# Patient Record
Sex: MALE | Race: WHITE | Employment: FULL TIME | URBAN - METROPOLITAN AREA
[De-identification: names, ages, dates, MRNs, and addresses within clinical notes are randomized per-mention and may not be internally consistent; named-entity substitution may affect disease eponyms.]

---

## 2017-05-16 ENCOUNTER — OFFICE VISIT (OUTPATIENT)
Dept: FAMILY MEDICINE CLINIC | Age: 27
End: 2017-05-16
Payer: COMMERCIAL

## 2017-05-16 VITALS
BODY MASS INDEX: 35.55 KG/M2 | DIASTOLIC BLOOD PRESSURE: 80 MMHG | WEIGHT: 240 LBS | HEIGHT: 69 IN | SYSTOLIC BLOOD PRESSURE: 120 MMHG

## 2017-05-16 DIAGNOSIS — F41.9 ANXIETY: ICD-10-CM

## 2017-05-16 PROCEDURE — 99214 OFFICE O/P EST MOD 30 MIN: CPT | Performed by: FAMILY MEDICINE

## 2017-05-16 RX ORDER — CITALOPRAM 20 MG/1
TABLET ORAL
Qty: 30 TABLET | Refills: 5 | Status: SHIPPED | OUTPATIENT
Start: 2017-05-16 | End: 2017-11-16 | Stop reason: SDUPTHER

## 2017-05-16 RX ORDER — ALPRAZOLAM 0.5 MG/1
0.5 TABLET ORAL NIGHTLY PRN
Qty: 30 TABLET | Refills: 0 | Status: SHIPPED | OUTPATIENT
Start: 2017-05-16 | End: 2017-11-16 | Stop reason: SDUPTHER

## 2017-05-16 RX ORDER — UBIDECARENONE 75 MG
50 CAPSULE ORAL DAILY
COMMUNITY

## 2017-05-16 ASSESSMENT — ENCOUNTER SYMPTOMS
EYE REDNESS: 0
DIARRHEA: 0
VOMITING: 0
COUGH: 0
ABDOMINAL PAIN: 0
NAUSEA: 0
EYE DISCHARGE: 0
SHORTNESS OF BREATH: 0

## 2017-08-07 ENCOUNTER — TELEPHONE (OUTPATIENT)
Dept: FAMILY MEDICINE CLINIC | Age: 27
End: 2017-08-07

## 2017-08-08 ENCOUNTER — NURSE ONLY (OUTPATIENT)
Dept: FAMILY MEDICINE CLINIC | Age: 27
End: 2017-08-08
Payer: COMMERCIAL

## 2017-08-08 DIAGNOSIS — J30.89 ENVIRONMENTAL AND SEASONAL ALLERGIES: Primary | ICD-10-CM

## 2017-08-08 PROCEDURE — 96372 THER/PROPH/DIAG INJ SC/IM: CPT | Performed by: FAMILY MEDICINE

## 2017-08-08 RX ORDER — TRIAMCINOLONE ACETONIDE 40 MG/ML
40 INJECTION, SUSPENSION INTRA-ARTICULAR; INTRAMUSCULAR ONCE
Status: COMPLETED | OUTPATIENT
Start: 2017-08-08 | End: 2017-08-08

## 2017-08-08 RX ADMIN — TRIAMCINOLONE ACETONIDE 40 MG: 40 INJECTION, SUSPENSION INTRA-ARTICULAR; INTRAMUSCULAR at 08:42

## 2017-11-16 ENCOUNTER — OFFICE VISIT (OUTPATIENT)
Dept: FAMILY MEDICINE CLINIC | Age: 27
End: 2017-11-16
Payer: COMMERCIAL

## 2017-11-16 VITALS — BODY MASS INDEX: 37.95 KG/M2 | WEIGHT: 257 LBS | SYSTOLIC BLOOD PRESSURE: 128 MMHG | DIASTOLIC BLOOD PRESSURE: 88 MMHG

## 2017-11-16 DIAGNOSIS — F41.9 ANXIETY: ICD-10-CM

## 2017-11-16 PROCEDURE — 99213 OFFICE O/P EST LOW 20 MIN: CPT | Performed by: FAMILY MEDICINE

## 2017-11-16 RX ORDER — ALPRAZOLAM 0.5 MG/1
0.5 TABLET ORAL NIGHTLY PRN
Qty: 30 TABLET | Refills: 0 | Status: SHIPPED | OUTPATIENT
Start: 2017-11-16 | End: 2018-05-16 | Stop reason: SDUPTHER

## 2017-11-16 RX ORDER — CITALOPRAM 20 MG/1
TABLET ORAL
Qty: 30 TABLET | Refills: 5 | Status: SHIPPED | OUTPATIENT
Start: 2017-11-16 | End: 2018-05-16 | Stop reason: SDUPTHER

## 2017-11-16 ASSESSMENT — ENCOUNTER SYMPTOMS
EYE REDNESS: 0
VOMITING: 0
TROUBLE SWALLOWING: 0
ABDOMINAL PAIN: 0
DIARRHEA: 0
EYE DISCHARGE: 0
BLOOD IN STOOL: 0
SHORTNESS OF BREATH: 0
COUGH: 0
NAUSEA: 0
CONSTIPATION: 0

## 2017-11-16 NOTE — PROGRESS NOTES
use drugs. Family History:     Family History   Problem Relation Age of Onset    High Cholesterol Father        Review of Systems:       Review of Systems   Constitutional: Negative for chills, fatigue and fever. HENT: Negative for trouble swallowing. Eyes: Negative for discharge, redness and visual disturbance. Respiratory: Negative for cough and shortness of breath. Cardiovascular: Negative for chest pain, palpitations and leg swelling. Gastrointestinal: Negative for abdominal pain, blood in stool, constipation, diarrhea, nausea and vomiting. Genitourinary: Negative for dysuria and hematuria. Musculoskeletal: Negative for joint swelling and neck pain. Skin: Negative for rash. Neurological: Negative for dizziness, tremors, light-headedness and headaches. Psychiatric/Behavioral: Negative for sleep disturbance. The patient is nervous/anxious. Physical Exam:     Physical Exam   Constitutional: He is oriented to person, place, and time. He appears well-developed and well-nourished. HENT:   Head: Normocephalic and atraumatic. Eyes: Conjunctivae are normal. Pupils are equal, round, and reactive to light. Right eye exhibits no discharge. Left eye exhibits no discharge. Neck: Neck supple. No thyromegaly present. Cardiovascular: Normal rate and regular rhythm. No murmur heard. Pulmonary/Chest: Effort normal and breath sounds normal. No respiratory distress. He has no wheezes. Abdominal: Soft. Bowel sounds are normal. He exhibits no distension. There is no tenderness. Musculoskeletal: He exhibits no edema. Lymphadenopathy:     He has no cervical adenopathy. Neurological: He is alert and oriented to person, place, and time. Skin: Skin is warm and dry. No rash noted. No erythema. Psychiatric: He has a normal mood and affect. Vitals reviewed.       Vitals:  /88   Wt 257 lb (116.6 kg)   BMI 37.95 kg/m²       Data:     Lab Results   Component Value Date     08/03/2012    K 3.9 08/03/2012     08/03/2012    CO2 29 08/03/2012    BUN 13 08/03/2012    CREATININE 1.02 08/03/2012    GLUCOSE 93 09/29/2016    LABALBU 5.0 08/03/2012    BILITOT 0.93 08/03/2012    ALKPHOS 72 08/03/2012    AST 20 08/03/2012    ALT 25 08/03/2012     Lab Results   Component Value Date    WBC 7.1 08/03/2012    RBC 5.48 08/03/2012    HGB 16.3 08/03/2012    HCT 47.3 08/03/2012    MCV 86.4 08/03/2012    MCH 29.8 08/03/2012    MCHC 34.5 08/03/2012    RDW 12.4 08/03/2012     08/03/2012    MPV NOT REPORTED 08/03/2012     Lab Results   Component Value Date    TSH 1.15 08/03/2012     Lab Results   Component Value Date    CHOL 233 09/29/2016    HDL 45 09/29/2016          Assessment/Plan:       1. Anxiety  Pt to continue on the celexa 20mg daily and then ONLY use the xanax as needed. - citalopram (CELEXA) 20 MG tablet; take 1 tablet by mouth daily  Dispense: 30 tablet; Refill: 5    F/u 6mos, pt will try to eat less fast food and try to exercise better over the winter to lose wt.     Electronically signed by Vikram Swift MD on 11/16/2017 at 11:32 AM

## 2018-05-16 ENCOUNTER — OFFICE VISIT (OUTPATIENT)
Dept: FAMILY MEDICINE CLINIC | Age: 28
End: 2018-05-16
Payer: COMMERCIAL

## 2018-05-16 VITALS
SYSTOLIC BLOOD PRESSURE: 130 MMHG | WEIGHT: 268 LBS | OXYGEN SATURATION: 98 % | DIASTOLIC BLOOD PRESSURE: 86 MMHG | HEART RATE: 90 BPM | BODY MASS INDEX: 39.58 KG/M2

## 2018-05-16 DIAGNOSIS — F41.9 ANXIETY: ICD-10-CM

## 2018-05-16 PROCEDURE — 99213 OFFICE O/P EST LOW 20 MIN: CPT | Performed by: FAMILY MEDICINE

## 2018-05-16 RX ORDER — ALPRAZOLAM 0.5 MG/1
0.5 TABLET ORAL NIGHTLY PRN
Qty: 30 TABLET | Refills: 0 | Status: SHIPPED | OUTPATIENT
Start: 2018-05-16 | End: 2018-11-13 | Stop reason: SDUPTHER

## 2018-05-16 RX ORDER — CITALOPRAM 20 MG/1
TABLET ORAL
Qty: 30 TABLET | Refills: 5 | Status: SHIPPED | OUTPATIENT
Start: 2018-05-16 | End: 2018-11-13 | Stop reason: SDUPTHER

## 2018-05-16 ASSESSMENT — PATIENT HEALTH QUESTIONNAIRE - PHQ9
1. LITTLE INTEREST OR PLEASURE IN DOING THINGS: 0
SUM OF ALL RESPONSES TO PHQ9 QUESTIONS 1 & 2: 0
SUM OF ALL RESPONSES TO PHQ QUESTIONS 1-9: 0
2. FEELING DOWN, DEPRESSED OR HOPELESS: 0

## 2018-05-16 ASSESSMENT — ENCOUNTER SYMPTOMS
DIARRHEA: 0
COUGH: 0
EYE REDNESS: 0
EYE DISCHARGE: 0
SHORTNESS OF BREATH: 0
VOMITING: 0
SORE THROAT: 0

## 2018-08-02 ENCOUNTER — NURSE ONLY (OUTPATIENT)
Dept: FAMILY MEDICINE CLINIC | Age: 28
End: 2018-08-02
Payer: COMMERCIAL

## 2018-08-02 ENCOUNTER — TELEPHONE (OUTPATIENT)
Dept: FAMILY MEDICINE CLINIC | Age: 28
End: 2018-08-02

## 2018-08-02 DIAGNOSIS — J30.89 ENVIRONMENTAL AND SEASONAL ALLERGIES: Primary | ICD-10-CM

## 2018-08-02 PROCEDURE — 96372 THER/PROPH/DIAG INJ SC/IM: CPT | Performed by: FAMILY MEDICINE

## 2018-08-02 RX ORDER — TRIAMCINOLONE ACETONIDE 40 MG/ML
40 INJECTION, SUSPENSION INTRA-ARTICULAR; INTRAMUSCULAR ONCE
Status: COMPLETED | OUTPATIENT
Start: 2018-08-02 | End: 2018-08-02

## 2018-08-02 RX ADMIN — TRIAMCINOLONE ACETONIDE 40 MG: 40 INJECTION, SUSPENSION INTRA-ARTICULAR; INTRAMUSCULAR at 16:02

## 2018-08-02 NOTE — TELEPHONE ENCOUNTER
Patient asking if he could get a kenalog shot for his allergies - last one was 08/08/17    Health Maintenance   Topic Date Due    HIV screen  12/25/2004    Flu vaccine (1) 11/26/2018 (Originally 9/1/2018)    DTaP/Tdap/Td vaccine (2 - Td) 08/11/2025             (applicable per patient's age: Cancer Screenings, Depression Screening, Fall Risk Screening, Immunizations)    LDL Cholesterol (mg/dL)   Date Value   09/29/2016 149 (H)     AST (U/L)   Date Value   08/03/2012 20     ALT (U/L)   Date Value   08/03/2012 25     BUN (mg/dL)   Date Value   08/03/2012 13      (goal A1C is < 7)   (goal LDL is <100) need 30-50% reduction from baseline     BP Readings from Last 3 Encounters:   05/16/18 130/86   11/16/17 128/88   05/16/17 120/80    (goal /80)      All Future Testing planned in [facility]:      Next Visit Date:  Future Appointments  Date Time Provider José Miguel Chauhan   11/13/2018 10:30 AM MD Marek Hanson [facility]            Patient Active Problem List:     Anxiety     GERD (gastroesophageal reflux disease)     Seasonal allergies

## 2018-10-20 ENCOUNTER — OFFICE VISIT (OUTPATIENT)
Dept: PRIMARY CARE CLINIC | Age: 28
End: 2018-10-20
Payer: COMMERCIAL

## 2018-10-20 VITALS
WEIGHT: 253 LBS | TEMPERATURE: 98.8 F | HEART RATE: 100 BPM | BODY MASS INDEX: 37.36 KG/M2 | SYSTOLIC BLOOD PRESSURE: 156 MMHG | OXYGEN SATURATION: 96 % | DIASTOLIC BLOOD PRESSURE: 98 MMHG

## 2018-10-20 DIAGNOSIS — J20.9 BRONCHITIS, ACUTE, WITH BRONCHOSPASM: Primary | ICD-10-CM

## 2018-10-20 PROCEDURE — 99213 OFFICE O/P EST LOW 20 MIN: CPT | Performed by: NURSE PRACTITIONER

## 2018-10-20 RX ORDER — AMOXICILLIN 500 MG/1
500 CAPSULE ORAL 3 TIMES DAILY
Qty: 30 CAPSULE | Refills: 0 | Status: SHIPPED | OUTPATIENT
Start: 2018-10-20 | End: 2018-10-30

## 2018-10-20 ASSESSMENT — ENCOUNTER SYMPTOMS
RHINORRHEA: 1
SHORTNESS OF BREATH: 1
VOMITING: 0
DIARRHEA: 0
SORE THROAT: 1
WHEEZING: 0
COUGH: 1
NAUSEA: 1

## 2018-10-20 NOTE — PROGRESS NOTES
[address] [facility] WALK-IN [facility] [place] [phone] [id number]  Dept: [phone]  Dept Fax: [phone]    Susan Anderson is a 28 y.o. male who presents to the [facility] in Care today for hismedical conditions/complaints as noted below. Guilherme Sleet Holthouse is c/o of Cough (Patient complains of productive cough, started 5 days ago. No fever.) and Pharyngitis (Patient complains of sore throat that started 5 days, he said he think throat hurts from drainage. )      HPI:     Cough   This is a new problem. The current episode started in the past 7 days (Started 5 days ago with productive cough, runny nose, nasal congestion and sore throat. Denies fever. Having cold sweats at night.). The problem has been gradually worsening. The problem occurs every few minutes. The cough is productive of sputum. Associated symptoms include headaches, nasal congestion, postnasal drip, rhinorrhea, a sore throat and shortness of breath. Pertinent negatives include no chest pain, chills, ear congestion, ear pain, fever, myalgias, rash or wheezing. The symptoms are aggravated by lying down. Risk factors for lung disease include smoking/tobacco exposure. Treatments tried: ibuprofen. The treatment provided no relief. There is no history of asthma, bronchitis, environmental allergies or pneumonia.        Past Medical History:   Diagnosis Date    Anxiety     Heart palpitations     Hypertension         Current Outpatient Prescriptions   Medication Sig Dispense Refill    Pseudoephedrine-DM-GG 60- MG TABS Take 1 tablet by mouth every 6 hours as needed (For cough, congestion and/or sinus pressure.) 28 tablet 0    amoxicillin (AMOXIL) 500 MG capsule Take 1 capsule by mouth 3 times daily for 10 days 30 capsule 0    citalopram (CELEXA) 20 MG tablet take 1 tablet by mouth daily 30 tablet 5    vitamin B-12 (CYANOCOBALAMIN) 100 MCG tablet Take 50 mcg by mouth daily      Cholecalciferol (VITAMIN D3) 5000 UNITS TABS Pt taking 2 tablet daily       No current facility-administered medications for this visit. No Known Allergies    Subjective:     Review of Systems   Constitutional: Positive for diaphoresis and fatigue. Negative for appetite change, chills and fever. HENT: Positive for congestion, postnasal drip, rhinorrhea and sore throat. Negative for ear pain. Respiratory: Positive for cough and shortness of breath. Negative for wheezing. Cardiovascular: Negative for chest pain. Gastrointestinal: Positive for nausea. Negative for diarrhea and vomiting. Rectal pain: few times. Musculoskeletal: Negative for myalgias. Skin: Negative for rash and wound. Allergic/Immunologic: Negative for environmental allergies. Neurological: Positive for headaches. Negative for dizziness and light-headedness. Objective:     Physical Exam   Constitutional: He is oriented to person, place, and time. He appears well-developed and well-nourished. He is cooperative. He does not appear ill. No distress. HENT:   Head: Normocephalic and atraumatic. Right Ear: Hearing, external ear and ear canal normal. No mastoid tenderness. Tympanic membrane is not injected, not erythematous and not bulging. A middle ear effusion (cloudy white fluid) is present. Left Ear: Hearing, external ear and ear canal normal. No mastoid tenderness. Tympanic membrane is not injected, not erythematous and not bulging. A middle ear effusion (cloudy white fluid) is present. Nose: Mucosal edema and rhinorrhea present. Right sinus exhibits no maxillary sinus tenderness and no frontal sinus tenderness. Left sinus exhibits no maxillary sinus tenderness and no frontal sinus tenderness. Mouth/Throat: Uvula is midline and mucous membranes are normal. Posterior oropharyngeal erythema present. No oropharyngeal exudate, posterior oropharyngeal edema or tonsillar abscesses. Eyes: Pupils are equal, round, and reactive to light.  Conjunctivae are normal. Right eye exhibits no discharge. Left eye exhibits no discharge. Cardiovascular: Normal rate, regular rhythm, S1 normal, S2 normal, normal heart sounds and intact distal pulses. Exam reveals no gallop and no friction rub. No murmur heard. Pulmonary/Chest: Effort normal and breath sounds normal. No accessory muscle usage. No respiratory distress. He has no decreased breath sounds. He has no wheezes. He has no rhonchi. He has no rales. Occasional moist cough. Breath sounds clear B/L anterior and posterior lobes. No respiratory distress and/or audible wheezing. No rales or rhonchi. Musculoskeletal: Normal range of motion. Lymphadenopathy:     He has no cervical adenopathy. Right cervical: No superficial cervical and no posterior cervical adenopathy present. Left cervical: No superficial cervical and no posterior cervical adenopathy present. Neurological: He is alert and oriented to person, place, and time. Skin: Skin is warm and dry. No rash noted. He is not diaphoretic. No erythema. No pallor. Psychiatric: He has a normal mood and affect. His behavior is normal.   Nursing note and vitals reviewed. BP (!) 156/98 (Site: Left Upper Arm, Position: Sitting, Cuff Size: Large Adult)   Pulse 100   Temp 98.8 °F (37.1 °C) (Oral)   Wt 253 lb (114.8 kg)   SpO2 96%   BMI 37.36 kg/m²     Assessment:      Diagnosis Orders   1. Bronchitis, acute, with bronchospasm  Pseudoephedrine-DM-GG 60- MG TABS    amoxicillin (AMOXIL) 500 MG capsule       Plan:      Return if symptoms worsen or fail to improve, for Resume all previous medications as directed.     Orders Placed This Encounter   Medications    Pseudoephedrine-DM-GG 60- MG TABS     Sig: Take 1 tablet by mouth every 6 hours as needed (For cough, congestion and/or sinus pressure.)     Dispense:  28 tablet     Refill:  0    amoxicillin (AMOXIL) 500 MG capsule     Sig: Take 1 capsule by mouth 3 times daily for 10 days

## 2018-10-20 NOTE — PATIENT INSTRUCTIONS
taking amoxicillin with clarithromycin and/or lansoprazole to treat stomach ulcer, use all of your medications as directed. Read the medication guide or patient instructions provided with each medication. Do not change your doses or medication schedule without your doctor's advice. Use this medicine for the full prescribed length of time. Your symptoms may improve before the infection is completely cleared. Skipping doses may also increase your risk of further infection that is resistant to antibiotics. Amoxicillin will not treat a viral infection such as the flu or a common cold. Do not share this medicine with another person, even if they have the same symptoms you have. This medicine can cause unusual results with certain medical tests. Tell any doctor who treats you that you are using amoxicillin. Store at room temperature away from moisture, heat, and light. You may store liquid amoxicillin in a refrigerator but do not allow it to freeze. Throw away any liquid amoxicillin that is not used within 14 days after it was mixed at the pharmacy. What happens if I miss a dose? Take the missed dose as soon as you remember. Skip the missed dose if it is almost time for your next scheduled dose. Do not take extra medicine to make up the missed dose. What happens if I overdose? Seek emergency medical attention or call the Poison Help line at 1-874.509.5096. Overdose symptoms may include confusion, behavior changes, a severe skin rash, urinating less than usual, or seizure (black-out or convulsions). What should I avoid while taking amoxicillin? Antibiotic medicines can cause diarrhea, which may be a sign of a new infection. If you have diarrhea that is watery or bloody, stop using amoxicillin and call your doctor. Do not use anti-diarrhea medicine unless your doctor tells you to. What are the possible side effects of amoxicillin?   Get emergency medical help if you have any of these signs of an allergic   · You have a new or higher fever.     · You have a new rash.    Watch closely for changes in your health, and be sure to contact your doctor if:    · You cough more deeply or more often, especially if you notice more mucus or a change in the color of your mucus.     · You are not getting better as expected. Where can you learn more? Go to https://[email or web address] org and sign in to your [employer] account. Enter H333 in the Pantry box to learn more about \"Bronchitis: Care Instructions. \"     If you do not have an account, please click on the \"Sign Up Now\" link. Current as of: December 6, 2017  Content Version: 11.7  © 9424-0722 [employer]. Care instructions adapted under license by [facility] ([facility]). If you have questions about a medical condition or this instruction, always ask your healthcare professional. [address] any warranty or liability for your use of this information. · Antibiotic as directed. Take until all doses are completed. · Probiotic or greek yogurt daily while on antibiotic. · Capmist DM as directed on package, 1 tablet every 6 hours as needed for cough and congestion. · Practice meticulous handwashing and cover cough to prevent spread of infection  · Encouraged to increase fluids and rest  · Aleve/Ibuprofen/Tylenol OTC PRN for pain, discomfort or fever as directed on package. Take with food. · Cool mist humidifier  · Warm salt water gargles every 1 to 2 hours for sore throat. · Hot tea with honey and lemon for cough PRN  · Patient instructions given for acute bronchitis and amoxicillin. · To ER or call 911 if any difficulty breathing, shortness of breath, inability to swallow, hives, rash, facial/tongue swelling or temp greater than 103 degrees. · Follow up with PCP or [facility] Care as needed if symptoms worsen or do not improve.

## 2018-11-13 ENCOUNTER — OFFICE VISIT (OUTPATIENT)
Dept: FAMILY MEDICINE CLINIC | Age: 28
End: 2018-11-13
Payer: COMMERCIAL

## 2018-11-13 VITALS
OXYGEN SATURATION: 98 % | HEIGHT: 70 IN | HEART RATE: 86 BPM | BODY MASS INDEX: 37.22 KG/M2 | DIASTOLIC BLOOD PRESSURE: 80 MMHG | SYSTOLIC BLOOD PRESSURE: 138 MMHG | WEIGHT: 260 LBS

## 2018-11-13 DIAGNOSIS — Z23 NEED FOR INFLUENZA VACCINATION: ICD-10-CM

## 2018-11-13 DIAGNOSIS — F41.9 ANXIETY: Primary | ICD-10-CM

## 2018-11-13 PROCEDURE — 90471 IMMUNIZATION ADMIN: CPT | Performed by: FAMILY MEDICINE

## 2018-11-13 PROCEDURE — 99213 OFFICE O/P EST LOW 20 MIN: CPT | Performed by: FAMILY MEDICINE

## 2018-11-13 PROCEDURE — 90686 IIV4 VACC NO PRSV 0.5 ML IM: CPT | Performed by: FAMILY MEDICINE

## 2018-11-13 RX ORDER — ALPRAZOLAM 0.5 MG/1
0.5 TABLET ORAL NIGHTLY PRN
Qty: 30 TABLET | Refills: 0 | Status: SHIPPED | OUTPATIENT
Start: 2018-11-13 | End: 2019-05-13 | Stop reason: SDUPTHER

## 2018-11-13 RX ORDER — CITALOPRAM 20 MG/1
TABLET ORAL
Qty: 30 TABLET | Refills: 5 | Status: SHIPPED | OUTPATIENT
Start: 2018-11-13 | End: 2019-05-13 | Stop reason: SDUPTHER

## 2018-11-13 ASSESSMENT — ENCOUNTER SYMPTOMS
DIARRHEA: 0
SHORTNESS OF BREATH: 0
EYE REDNESS: 0
EYE DISCHARGE: 0
VOMITING: 0
COUGH: 0

## 2018-11-13 NOTE — PROGRESS NOTES
alcohol. Drug Use:  reports that he does not use drugs. Family History:     Family History   Problem Relation Age of Onset    High Cholesterol Father        Review of Systems:       Review of Systems   Constitutional: Negative for chills and fever. HENT: Negative for congestion. Eyes: Negative for discharge and redness. Respiratory: Negative for cough and shortness of breath. Cardiovascular: Negative for chest pain, palpitations and leg swelling. Gastrointestinal: Negative for diarrhea and vomiting. Skin: Negative for pallor and rash. Neurological: Negative for dizziness and facial asymmetry. Psychiatric/Behavioral: Negative for decreased concentration and sleep disturbance. The patient is nervous/anxious. Physical Exam:     Physical Exam   Constitutional: He is oriented to person, place, and time. He appears well-developed and well-nourished. HENT:   Head: Normocephalic and atraumatic. Eyes: Pupils are equal, round, and reactive to light. Conjunctivae are normal.   Neck: Neck supple. No thyromegaly present. Cardiovascular: Normal rate and regular rhythm. No murmur heard. Pulmonary/Chest: Effort normal and breath sounds normal.   Abdominal: Soft. Bowel sounds are normal. There is no tenderness. Musculoskeletal: He exhibits no edema. Lymphadenopathy:     He has no cervical adenopathy. Neurological: He is alert and oriented to person, place, and time. Skin: Skin is dry. No rash noted. Psychiatric: He has a normal mood and affect. His behavior is normal.   Vitals reviewed.       Vitals:  /80 (Site: Left Upper Arm)   Pulse 86   Ht 5' 9.5\" (1.765 m)   Wt 260 lb (117.9 kg)   SpO2 98%   BMI 37.84 kg/m²       Data:     Lab Results   Component Value Date     08/03/2012    K 3.9 08/03/2012     08/03/2012    CO2 29 08/03/2012    BUN 13 08/03/2012    CREATININE 1.02 08/03/2012    GLUCOSE 93 09/29/2016    LABALBU 5.0 08/03/2012    BILITOT 0.93 08/03/2012 ALKPHOS 72 08/03/2012    AST 20 08/03/2012    ALT 25 08/03/2012     Lab Results   Component Value Date    WBC 7.1 08/03/2012    RBC 5.48 08/03/2012    HGB 16.3 08/03/2012    HCT 47.3 08/03/2012    MCV 86.4 08/03/2012    MCH 29.8 08/03/2012    MCHC 34.5 08/03/2012    RDW 12.4 08/03/2012     08/03/2012    MPV NOT REPORTED 08/03/2012     Lab Results   Component Value Date    TSH 1.15 08/03/2012     Lab Results   Component Value Date    CHOL 233 09/29/2016    HDL 45 09/29/2016          Assessment/Plan:       1. Anxiety  Stable on the celexa 20mg and uses the xanax as needed  - citalopram (CELEXA) 20 MG tablet; take 1 tablet by mouth daily  Dispense: 30 tablet; Refill: 5    2. Need for influenza vaccination  Flu shot given  - INFLUENZA, QUADV, 3 YRS AND OLDER, IM, PF, PREFILL SYR OR SDV, 0.5ML (FLUZONE QUADV, PF)        Return in about 6 months (around 5/13/2019) for Anxiety.       Electronically signed by Percy Aggarwal MD on 11/13/2018 at 11:09 AM

## 2019-04-10 ENCOUNTER — OFFICE VISIT (OUTPATIENT)
Dept: PRIMARY CARE CLINIC | Age: 29
End: 2019-04-10
Payer: COMMERCIAL

## 2019-04-10 VITALS
OXYGEN SATURATION: 96 % | BODY MASS INDEX: 38.36 KG/M2 | SYSTOLIC BLOOD PRESSURE: 138 MMHG | TEMPERATURE: 98.6 F | DIASTOLIC BLOOD PRESSURE: 92 MMHG | HEART RATE: 98 BPM | HEIGHT: 69 IN | WEIGHT: 259 LBS

## 2019-04-10 DIAGNOSIS — J01.00 ACUTE NON-RECURRENT MAXILLARY SINUSITIS: Primary | ICD-10-CM

## 2019-04-10 PROCEDURE — 99213 OFFICE O/P EST LOW 20 MIN: CPT | Performed by: NURSE PRACTITIONER

## 2019-04-10 RX ORDER — AMOXICILLIN AND CLAVULANATE POTASSIUM 875; 125 MG/1; MG/1
1 TABLET, FILM COATED ORAL 2 TIMES DAILY
Qty: 20 TABLET | Refills: 0 | Status: SHIPPED | OUTPATIENT
Start: 2019-04-10 | End: 2019-04-20

## 2019-04-10 ASSESSMENT — ENCOUNTER SYMPTOMS
RHINORRHEA: 1
NAUSEA: 0
SORE THROAT: 1
SINUS PAIN: 1
COUGH: 1
VOMITING: 0
SHORTNESS OF BREATH: 0
SINUS PRESSURE: 1
WHEEZING: 0

## 2019-04-10 NOTE — PROGRESS NOTES
[address] [facility] WALK-IN [facility] [place] [phone] [id number]  Dept: [phone]  Dept Fax: [phone]     Hal Tapia is a 33 y.o. male who presents to the [facility] in Care today for hismedical conditions/complaints as noted below. Russ Oshea is c/o of Cough; Pharyngitis (started about 3 days ago, has gotten worse at the days go on. has taken some tylenol cold and sudifed.); and Nasal Congestion      HPI:     Cough   This is a new problem. The current episode started in the past 7 days (Started 3-4 days ago with dry cough, sore throat, nasal congestion, runny nose and just feeling blah. Denies fever,chills or headaches. ). The problem has been gradually worsening. The problem occurs every few minutes. The cough is non-productive. Associated symptoms include nasal congestion, postnasal drip, rhinorrhea and a sore throat. Pertinent negatives include no chills, ear pain, fever, headaches, myalgias, rash, shortness of breath or wheezing. Associated symptoms comments: Sinus pain/pressure over cheeks. . Nothing aggravates the symptoms. Treatments tried: Ibuprofen. The treatment provided mild relief. His past medical history is significant for bronchitis and environmental allergies. There is no history of asthma or pneumonia. Pharyngitis   This is a new problem. The current episode started in the past 7 days (Started 3-4 days ago with sore throat. ). The problem occurs constantly (worse in the morning). The problem has been gradually worsening. Associated symptoms include congestion, coughing, fatigue and a sore throat. Pertinent negatives include no chills, diaphoresis, fever, headaches, myalgias, nausea, rash or vomiting. The symptoms are aggravated by drinking and eating. He has tried NSAIDs for the symptoms. The treatment provided mild relief.           Past Medical History:   Diagnosis Date    Anxiety     Heart palpitations     Hypertension         Current Outpatient Medications   Medication Sig Dispense Refill    amoxicillin-clavulanate (AUGMENTIN) 875-125 MG per tablet Take 1 tablet by mouth 2 times daily for 10 days 20 tablet 0    citalopram (CELEXA) 20 MG tablet take 1 tablet by mouth daily 30 tablet 5    vitamin B-12 (CYANOCOBALAMIN) 100 MCG tablet Take 50 mcg by mouth daily      Cholecalciferol (VITAMIN D3) 5000 UNITS TABS Pt taking 2 tablet daily       No current facility-administered medications for this visit. No Known Allergies    Subjective:     Review of Systems   Constitutional: Positive for fatigue. Negative for appetite change, chills, diaphoresis and fever. HENT: Positive for congestion, postnasal drip, rhinorrhea, sinus pressure, sinus pain and sore throat. Negative for ear pain. Respiratory: Positive for cough. Negative for shortness of breath and wheezing. Gastrointestinal: Negative for nausea and vomiting. Musculoskeletal: Negative for myalgias. Skin: Negative for rash and wound. Allergic/Immunologic: Positive for environmental allergies. Neurological: Negative for dizziness, light-headedness and headaches. Objective:      Physical Exam   Constitutional: He is oriented to person, place, and time. Vital signs are normal. He appears well-developed and well-nourished. He is cooperative. He does not appear ill. No distress. HENT:   Head: Normocephalic and atraumatic. Right Ear: Hearing, external ear and ear canal normal. No drainage. No mastoid tenderness. Tympanic membrane is not injected, not erythematous and not bulging. A middle ear effusion (opaque white fluid) is present. Left Ear: Hearing, external ear and ear canal normal. No drainage. No mastoid tenderness. Tympanic membrane is not injected, not erythematous and not bulging. A middle ear effusion (opaque white fluid) is present. Nose: Mucosal edema and rhinorrhea present. Right sinus exhibits maxillary sinus tenderness.  Right sinus exhibits no frontal sinus tenderness. Left sinus exhibits maxillary sinus tenderness. Left sinus exhibits no frontal sinus tenderness. Mouth/Throat: Uvula is midline and mucous membranes are normal. Oropharyngeal exudate (thick yellow secretions posterior pharynx) and posterior oropharyngeal erythema present. No posterior oropharyngeal edema. Eyes: Pupils are equal, round, and reactive to light. Conjunctivae are normal. Right eye exhibits no discharge. Left eye exhibits no discharge. Cardiovascular: Normal rate, regular rhythm, S1 normal, S2 normal, normal heart sounds and intact distal pulses. Exam reveals no gallop and no friction rub. No murmur heard. Pulmonary/Chest: Effort normal and breath sounds normal. No accessory muscle usage. No respiratory distress. He has no decreased breath sounds. He has no wheezes. He has no rhonchi. He has no rales. Musculoskeletal: Normal range of motion. Lymphadenopathy:     He has no cervical adenopathy. Right cervical: No superficial cervical and no posterior cervical adenopathy present. Left cervical: No superficial cervical and no posterior cervical adenopathy present. Neurological: He is alert and oriented to person, place, and time. Skin: Skin is warm and dry. No rash noted. He is not diaphoretic. No erythema. No pallor. Psychiatric: He has a normal mood and affect. His behavior is normal.   Nursing note and vitals reviewed. BP (!) 138/92 (Site: Right Upper Arm, Position: Sitting, Cuff Size: Large Adult)   Pulse 98   Temp 98.6 °F (37 °C) (Oral)   Ht 5' 9\" (1.753 m)   Wt 259 lb (117.5 kg)   SpO2 96%   BMI 38.25 kg/m²     Assessment:      Diagnosis Orders   1. Acute non-recurrent maxillary sinusitis  amoxicillin-clavulanate (AUGMENTIN) 875-125 MG per tablet       Plan:      Return if symptoms worsen or fail to improve, for Resume all previous medications as directed.        Orders Placed This Encounter   Medications    amoxicillin-clavulanate (AUGMENTIN) 875-125 MG per tablet     Sig: Take 1 tablet by mouth 2 times daily for 10 days     Dispense:  20 tablet     Refill:  0      · Encouraged to increase fluids and rest  · Continue antibiotic as prescribed until all doses are completed - take with food  · Probiotic OTC or greek yogurt daily while on antibiotic  · Mucinex/Guaifenesin OTC as directed on package  · Nasal saline spray OTC every couple of hours for nasal congestion  · Fluticasone nasal spray, 1 spray each nostril, twice a day for 7 to 10 days  · Warm facial packs applied to face for 5 to 10 minutes, 3 times per day  · Aleve/Ibuprofen/Tylenol OTC PRN for pain, discomfort or fever  · Patient instructions given for acute sinusitis and augmentin. · To ER or call 911 if any difficulty breathing, shortness of breath, inability to swallow, hives, rash, facial/tongue swelling or temp greater than 103 degrees. · Follow up as needed with PCP if symptoms worsen or do not improve     Lenore Carson received counseling on the following healthy behaviors: medication adherence. Patient given educational materials - see patient instructions. Discussed use,benefit, and side effects of prescribed medications. Treatment plan discussed atvisit. Continue routine health care follow up. All patient questions answered. Pt voiced understanding.       Electronically signed by MICHEAL Mcarthur CNP on 4/10/2019 at 12:29 PM

## 2019-04-10 NOTE — PATIENT INSTRUCTIONS
SURVEY:    You may be receiving a survey from [employer] regarding your visit today. Please complete the survey to enable us to provide the highest quality of care to you and your family. If you cannot score us a very good on any question, please call the office to discuss how we could of made your experience a very good one. Thank you. Patient Education        Sinusitis: Care Instructions  Your Care Instructions    Sinusitis is an infection of the lining of the sinus cavities in your head. Sinusitis often follows a cold. It causes pain and pressure in your head and face. In most cases, sinusitis gets better on its own in 1 to 2 weeks. But some mild symptoms may last for several weeks. Sometimes antibiotics are needed. Follow-up care is a key part of your treatment and safety. Be sure to make and go to all appointments, and call your doctor if you are having problems. It's also a good idea to know your test results and keep a list of the medicines you take. How can you care for yourself at home? · Take an over-the-counter pain medicine, such as acetaminophen (Tylenol), ibuprofen (Advil, Motrin), or naproxen (Aleve). Read and follow all instructions on the label. · If the doctor prescribed antibiotics, take them as directed. Do not stop taking them just because you feel better. You need to take the full course of antibiotics. · Be careful when taking over-the-counter cold or flu medicines and Tylenol at the same time. Many of these medicines have acetaminophen, which is Tylenol. Read the labels to make sure that you are not taking more than the recommended dose. Too much acetaminophen (Tylenol) can be harmful. · Breathe warm, moist air from a steamy shower, a hot bath, or a sink filled with hot water. Avoid cold, dry air. Using a humidifier in your home may help. Follow the directions for cleaning the machine.   · Use saline (saltwater) nasal washes to help keep your nasal passages open and wash out mucus and bacteria. You can buy saline nose drops at a grocery store or drugstore. Or you can make your own at home by adding 1 teaspoon of salt and 1 teaspoon of baking soda to 2 cups of distilled water. If you make your own, fill a bulb syringe with the solution, insert the tip into your nostril, and squeeze gently. April Pastel your nose. · Put a hot, wet towel or a warm gel pack on your face 3 or 4 times a day for 5 to 10 minutes each time. · Try a decongestant nasal spray like oxymetazoline (Afrin). Do not use it for more than 3 days in a row. Using it for more than 3 days can make your congestion worse. When should you call for help? Call your doctor now or seek immediate medical care if:    · You have new or worse swelling or redness in your face or around your eyes.     · You have a new or higher fever.    Watch closely for changes in your health, and be sure to contact your doctor if:    · You have new or worse facial pain.     · The mucus from your nose becomes thicker (like pus) or has new blood in it.     · You are not getting better as expected. Where can you learn more? Go to [email or web address].[email or web address] org and sign in to your Nano account. Enter D775 in the [facility] box to learn more about \"Sinusitis: Care Instructions. \"     If you do not have an account, please click on the \"Sign Up Now\" link. Current as of: March 27, 2018  Content Version: 11.9  © 2451-9644 [employer]. Care instructions adapted under license by [facility] ([facility]). If you have questions about a medical condition or this instruction, always ask your healthcare professional. [place] [address] any warranty or liability for your use of this information.        Patient Education        amoxicillin and clavulanate potassium  Pronunciation:  am OK i HOLDEN in [address] ate caty TAS ee um  Brand:  Augmentin, Augmentin ES-600, Augmentin XR  What is the most important information I should know about amoxicillin and clavulanate potassium? You should not use this medicine if you have severe kidney disease, if you have had liver problems or jaundice while taking amoxicillin and clavulanate potassium, or if you are allergic to any penicillin or cephalosporin antibiotic, such as Amoxil, Ceftin, Cefzil, Moxatag, Omnicef, and others. What is amoxicillin and clavulanate potassium? Amoxicillin is a penicillin antibiotic that fights bacteria in the body. Clavulanate potassium is a beta-lactamase inhibitor that helps prevent certain bacteria from becoming resistant to amoxicillin. Amoxicillin and clavulanate potassium is a combination medicine used to treat many different infections caused by bacteria, such as sinusitis, pneumonia, ear infections, bronchitis, urinary tract infections, and infections of the skin. Amoxicillin and clavulanate potassium may also be used for purposes not listed in this medication guide. What should I discuss with my healthcare provider before taking amoxicillin and clavulanate potassium? You should not use this medicine if you are allergic to it, or if:  · you have severe kidney disease (or if you are on dialysis);  · you have had liver problems or jaundice while taking amoxicillin and clavulanate potassium; or  · you are allergic to any penicillin or cephalosporin antibiotic, such as Amoxil, Ceftin, Cefzil, Moxatag, Omnicef, and others. To make sure amoxicillin and clavulanate potassium is safe for you, tell your doctor if you have ever had:  · liver disease (hepatitis or jaundice);  · kidney disease; or  · mononucleosis. It is not known whether this medicine will harm an unborn baby. Tell your doctor if you are pregnant or plan to become pregnant. Amoxicillin and clavulanate potassium can make birth control pills less effective. Ask your doctor about using a non-hormonal birth control (condom, diaphragm with spermicide) to prevent pregnancy.   Amoxicillin and clavulanate Skip the missed dose if it is almost time for your next scheduled dose. Do not take extra medicine to make up the missed dose. What happens if I overdose? Seek emergency medical attention or call the Poison Help line at 1-736.387.7157. Overdose can cause nausea, vomiting, stomach pain, diarrhea, skin rash, drowsiness, hyperactivity, and decreased urination. What should I avoid while taking amoxicillin and clavulanate potassium? Avoid taking this medicine together with or just after eating a high-fat meal. This will make it harder for your body to absorb the medication. Antibiotic medicines can cause diarrhea, which may be a sign of a new infection. If you have diarrhea that is watery or bloody, call your doctor. Do not use anti-diarrhea medicine unless your doctor tells you to. What are the possible side effects of amoxicillin and clavulanate potassium? Get emergency medical help if you have signs of an allergic reaction: hives; difficult breathing; swelling of your face, lips, tongue, or throat. Call your doctor at once if you have:  · severe stomach pain, diarrhea that is watery or bloody;  · pale or yellowed skin, dark colored urine, fever, confusion or weakness;  · loss of appetite, upper stomach pain, jaundice (yellowing of the skin or eyes);  · easy bruising or bleeding;  · little or no urination; or  · severe skin reaction --fever, sore throat, swelling in your face or tongue, burning in your eyes, skin pain followed by a red or purple skin rash that spreads (especially in the face or upper body) and causes blistering and peeling. Common side effects may include:  · nausea, diarrhea; or  · vaginal itching or discharge; This is not a complete list of side effects and others may occur. Call your doctor for medical advice about side effects. You may report side effects to FDA at 5-774-MWA-1854. What other drugs will affect amoxicillin and clavulanate potassium?   Tell your doctor about all your current medicines and any you start or stop using, especially:  · allopurinol;  · probenecid; or  · a blood thinner --warfarin, Coumadin, Jantoven. This list is not complete. Other drugs may interact with amoxicillin and clavulanate potassium, including prescription and over-the-counter medicines, vitamins, and herbal products. Not all possible interactions are listed in this medication guide. Where can I get more information? Your pharmacist can provide more information about amoxicillin and clavulanate potassium. Remember, keep this and all other medicines out of the reach of children, never share your medicines with others, and use this medication only for the indication prescribed. Every effort has been made to ensure that the information provided by [facility] Amy Jorge is accurate, up-to-date, and complete, but no guarantee is made to that effect. Drug information contained herein may be time sensitive. [facility] information has been compiled for use by healthcare practitioners and consumers in the [place] and therefore [facility] [employer] [employer] does not warrant that uses outside of the [place] are appropriate, unless specifically indicated otherwise. [facility] drug information does not endorse drugs, diagnose patients or recommend therapy. [facility] drug information is an informational resource designed to assist licensed healthcare practitioners in caring for their patients and/or to serve consumers viewing this service as a supplement to, and not a substitute for, the expertise, skill, knowledge and judgment of healthcare practitioners. The absence of a warning for a given drug or drug combination in no way should be construed to indicate that the drug or drug combination is safe, effective or appropriate for any given patient. [facility] does not assume any responsibility for any aspect of healthcare administered with the aid of information [facility] provides.  The information contained herein is not intended to cover all possible uses, directions, precautions, warnings, drug interactions, allergic reactions, or adverse effects. If you have questions about the drugs you are taking, check with your doctor, nurse or pharmacist.  Copyright 2612-6358 [address] [place]. Version: 11.02. Revision date: 1/2/2018. Care instructions adapted under license by [facility] ([facility]). If you have questions about a medical condition or this instruction, always ask your healthcare professional. [place] [address] any warranty or liability for your use of this information. · Encouraged to increase fluids and rest  · Continue antibiotic as prescribed until all doses are completed - take with food  · Probiotic OTC or greek yogurt daily while on antibiotic  · Mucinex/Guaifenesin OTC as directed on package  · Nasal saline spray OTC every couple of hours for nasal congestion  · Fluticasone nasal spray, 1 spray each nostril, twice a day for 7 to 10 days  · Warm facial packs applied to face for 5 to 10 minutes, 3 times per day  · Aleve/Ibuprofen/Tylenol OTC PRN for pain, discomfort or fever  · Patient instructions given for acute sinusitis and augmentin. · To ER or call 911 if any difficulty breathing, shortness of breath, inability to swallow, hives, rash, facial/tongue swelling or temp greater than 103 degrees.   · Follow up as needed with PCP if symptoms worsen or do not improve

## 2019-05-13 ENCOUNTER — OFFICE VISIT (OUTPATIENT)
Dept: FAMILY MEDICINE CLINIC | Age: 29
End: 2019-05-13
Payer: COMMERCIAL

## 2019-05-13 VITALS
OXYGEN SATURATION: 98 % | SYSTOLIC BLOOD PRESSURE: 128 MMHG | WEIGHT: 262 LBS | HEART RATE: 90 BPM | DIASTOLIC BLOOD PRESSURE: 82 MMHG | BODY MASS INDEX: 38.69 KG/M2

## 2019-05-13 DIAGNOSIS — F41.9 ANXIETY: ICD-10-CM

## 2019-05-13 PROCEDURE — 99213 OFFICE O/P EST LOW 20 MIN: CPT | Performed by: FAMILY MEDICINE

## 2019-05-13 RX ORDER — CITALOPRAM 20 MG/1
TABLET ORAL
Qty: 30 TABLET | Refills: 5 | Status: SHIPPED | OUTPATIENT
Start: 2019-05-13 | End: 2019-11-13 | Stop reason: SDUPTHER

## 2019-05-13 RX ORDER — ALPRAZOLAM 0.5 MG/1
0.5 TABLET ORAL NIGHTLY PRN
Qty: 30 TABLET | Refills: 0 | Status: SHIPPED | OUTPATIENT
Start: 2019-05-13 | End: 2019-11-13 | Stop reason: SDUPTHER

## 2019-05-13 ASSESSMENT — PATIENT HEALTH QUESTIONNAIRE - PHQ9
2. FEELING DOWN, DEPRESSED OR HOPELESS: 0
SUM OF ALL RESPONSES TO PHQ QUESTIONS 1-9: 0
SUM OF ALL RESPONSES TO PHQ QUESTIONS 1-9: 0
1. LITTLE INTEREST OR PLEASURE IN DOING THINGS: 0
SUM OF ALL RESPONSES TO PHQ9 QUESTIONS 1 & 2: 0

## 2019-05-13 ASSESSMENT — ENCOUNTER SYMPTOMS
COUGH: 0
EYE REDNESS: 0
EYE DISCHARGE: 0
SHORTNESS OF BREATH: 0
FACIAL SWELLING: 0

## 2019-05-13 NOTE — PATIENT INSTRUCTIONS
SURVEY:    You may be receiving a survey from [email or web address] regarding your visit today. Please complete the survey to enable us to provide the highest quality of care to you and your family. If you cannot score us a very good on any question, please call the office to discuss how we could have made your experience a very good one. Thank you.

## 2019-05-13 NOTE — PROGRESS NOTES
Negative for congestion and facial swelling. Eyes: Negative for discharge and redness. Respiratory: Negative for cough and shortness of breath. Neurological: Negative for dizziness and facial asymmetry. Psychiatric/Behavioral: The patient is not nervous/anxious. Physical Exam:     Physical Exam   Constitutional: He appears well-developed and well-nourished. HENT:   Head: Normocephalic and atraumatic. Eyes: Pupils are equal, round, and reactive to light. Conjunctivae are normal. Right eye exhibits no discharge. Left eye exhibits no discharge. Neck: Neck supple. No thyromegaly present. Cardiovascular: Normal rate and regular rhythm. No murmur heard. Pulmonary/Chest: Effort normal and breath sounds normal.   Abdominal: Soft. Bowel sounds are normal. He exhibits no distension. There is no tenderness. Lymphadenopathy:     He has no cervical adenopathy. Neurological: He is alert. Skin: No rash noted. No erythema. Psychiatric: He has a normal mood and affect. Vitals reviewed. Vitals:  /82   Pulse 90   Wt 262 lb (118.8 kg)   SpO2 98%   BMI 38.69 kg/m²       Data:     Lab Results   Component Value Date     08/03/2012    K 3.9 08/03/2012     08/03/2012    CO2 29 08/03/2012    BUN 13 08/03/2012    CREATININE 1.02 08/03/2012    GLUCOSE 93 09/29/2016    LABALBU 5.0 08/03/2012    BILITOT 0.93 08/03/2012    ALKPHOS 72 08/03/2012    AST 20 08/03/2012    ALT 25 08/03/2012     Lab Results   Component Value Date    WBC 7.1 08/03/2012    RBC 5.48 08/03/2012    HGB 16.3 08/03/2012    HCT 47.3 08/03/2012    MCV 86.4 08/03/2012    MCH 29.8 08/03/2012    MCHC 34.5 08/03/2012    RDW 12.4 08/03/2012     08/03/2012    MPV NOT REPORTED 08/03/2012     Lab Results   Component Value Date    TSH 1.15 08/03/2012     Lab Results   Component Value Date    CHOL 233 09/29/2016    HDL 45 09/29/2016          Assessment/Plan:        1.  Anxiety  Stable on the celexa daily and xanax as needed. - citalopram (CELEXA) 20 MG tablet; take 1 tablet by mouth daily  Dispense: 30 tablet; Refill: 5  - ALPRAZolam (XANAX) 0.5 MG tablet; Take 1 tablet by mouth nightly as needed for Anxiety for up to 30 days. Dispense: 30 tablet; Refill: 0        Return in about 6 months (around 11/13/2019).       Electronically signed by Lida Oakes MD on 5/13/2019 at 10:04 AM

## 2019-08-15 ENCOUNTER — TELEPHONE (OUTPATIENT)
Dept: FAMILY MEDICINE CLINIC | Age: 29
End: 2019-08-15

## 2019-08-20 ENCOUNTER — NURSE ONLY (OUTPATIENT)
Dept: FAMILY MEDICINE CLINIC | Age: 29
End: 2019-08-20
Payer: COMMERCIAL

## 2019-08-20 DIAGNOSIS — J30.2 SEASONAL ALLERGIES: Primary | ICD-10-CM

## 2019-08-20 PROCEDURE — 96372 THER/PROPH/DIAG INJ SC/IM: CPT | Performed by: NURSE PRACTITIONER

## 2019-08-20 RX ORDER — TRIAMCINOLONE ACETONIDE 40 MG/ML
40 INJECTION, SUSPENSION INTRA-ARTICULAR; INTRAMUSCULAR ONCE
Status: COMPLETED | OUTPATIENT
Start: 2019-08-20 | End: 2019-08-20

## 2019-08-20 RX ADMIN — TRIAMCINOLONE ACETONIDE 40 MG: 40 INJECTION, SUSPENSION INTRA-ARTICULAR; INTRAMUSCULAR at 09:51

## 2019-11-13 ENCOUNTER — OFFICE VISIT (OUTPATIENT)
Dept: FAMILY MEDICINE CLINIC | Age: 29
End: 2019-11-13
Payer: COMMERCIAL

## 2019-11-13 VITALS
SYSTOLIC BLOOD PRESSURE: 142 MMHG | DIASTOLIC BLOOD PRESSURE: 100 MMHG | WEIGHT: 258 LBS | BODY MASS INDEX: 38.1 KG/M2 | OXYGEN SATURATION: 98 % | HEART RATE: 110 BPM

## 2019-11-13 DIAGNOSIS — L98.9 BENIGN SKIN LESION OF FACE: ICD-10-CM

## 2019-11-13 DIAGNOSIS — F41.9 ANXIETY: ICD-10-CM

## 2019-11-13 DIAGNOSIS — R03.0 ELEVATED BP WITHOUT DIAGNOSIS OF HYPERTENSION: Primary | ICD-10-CM

## 2019-11-13 PROCEDURE — 99213 OFFICE O/P EST LOW 20 MIN: CPT | Performed by: FAMILY MEDICINE

## 2019-11-13 RX ORDER — MAGNESIUM OXIDE 400 MG/1
100 TABLET ORAL DAILY
COMMUNITY

## 2019-11-13 RX ORDER — ALPRAZOLAM 0.5 MG/1
0.5 TABLET ORAL NIGHTLY PRN
Qty: 30 TABLET | Refills: 0 | Status: SHIPPED | OUTPATIENT
Start: 2019-11-13 | End: 2020-07-09 | Stop reason: SDUPTHER

## 2019-11-13 RX ORDER — CITALOPRAM 20 MG/1
TABLET ORAL
Qty: 30 TABLET | Refills: 5 | Status: SHIPPED | OUTPATIENT
Start: 2019-11-13 | End: 2020-05-12 | Stop reason: SDUPTHER

## 2019-11-13 ASSESSMENT — ENCOUNTER SYMPTOMS
ABDOMINAL PAIN: 0
DIARRHEA: 0
COUGH: 0
NAUSEA: 0
EYE DISCHARGE: 0
EYE ITCHING: 0
FACIAL SWELLING: 0
VOMITING: 0
SHORTNESS OF BREATH: 0
EYE REDNESS: 0
EYE PAIN: 0

## 2020-01-21 ENCOUNTER — OFFICE VISIT (OUTPATIENT)
Dept: FAMILY MEDICINE CLINIC | Age: 30
End: 2020-01-21
Payer: COMMERCIAL

## 2020-01-21 ENCOUNTER — HOSPITAL ENCOUNTER (OUTPATIENT)
Age: 30
Discharge: HOME | End: 2020-01-21
Payer: COMMERCIAL

## 2020-01-21 VITALS
BODY MASS INDEX: 38.1 KG/M2 | HEART RATE: 90 BPM | WEIGHT: 258 LBS | SYSTOLIC BLOOD PRESSURE: 130 MMHG | OXYGEN SATURATION: 98 % | DIASTOLIC BLOOD PRESSURE: 84 MMHG

## 2020-01-21 LAB
ALBUMIN SERPL-MCNC: 4.8 G/DL (ref 3.5–5.2)
ALBUMIN/GLOBULIN RATIO: ABNORMAL (ref 1–2.5)
ALP BLD-CCNC: 59 U/L (ref 40–129)
ALT SERPL-CCNC: 36 U/L (ref 5–41)
ANION GAP SERPL CALCULATED.3IONS-SCNC: 13 MMOL/L (ref 9–17)
AST SERPL-CCNC: 19 U/L
BILIRUB SERPL-MCNC: 0.54 MG/DL (ref 0.3–1.2)
BUN BLDV-MCNC: 17 MG/DL (ref 6–20)
BUN/CREAT BLD: 20 (ref 9–20)
CALCIUM SERPL-MCNC: 10.1 MG/DL (ref 8.6–10.4)
CHLORIDE BLD-SCNC: 100 MMOL/L (ref 98–107)
CHOLESTEROL/HDL RATIO: 6.1
CHOLESTEROL: 263 MG/DL
CO2: 25 MMOL/L (ref 20–31)
CREAT SERPL-MCNC: 0.87 MG/DL (ref 0.7–1.2)
GFR AFRICAN AMERICAN: >60 ML/MIN
GFR NON-AFRICAN AMERICAN: >60 ML/MIN
GFR SERPL CREATININE-BSD FRML MDRD: ABNORMAL ML/MIN/{1.73_M2}
GFR SERPL CREATININE-BSD FRML MDRD: ABNORMAL ML/MIN/{1.73_M2}
GLUCOSE BLD-MCNC: 103 MG/DL (ref 70–99)
HDLC SERPL-MCNC: 43 MG/DL
LDL CHOLESTEROL: 166 MG/DL (ref 0–130)
PATIENT FASTING?: YES
POTASSIUM SERPL-SCNC: 4.2 MMOL/L (ref 3.7–5.3)
SODIUM BLD-SCNC: 138 MMOL/L (ref 135–144)
TOTAL PROTEIN: 7.7 G/DL (ref 6.4–8.3)
TRIGL SERPL-MCNC: 268 MG/DL
VLDLC SERPL CALC-MCNC: ABNORMAL MG/DL (ref 1–30)

## 2020-01-21 PROCEDURE — 99213 OFFICE O/P EST LOW 20 MIN: CPT | Performed by: FAMILY MEDICINE

## 2020-01-21 PROCEDURE — 36415 COLL VENOUS BLD VENIPUNCTURE: CPT

## 2020-01-21 PROCEDURE — 80061 LIPID PANEL: CPT

## 2020-01-21 PROCEDURE — 80053 COMPREHEN METABOLIC PANEL: CPT

## 2020-01-21 ASSESSMENT — ENCOUNTER SYMPTOMS
COUGH: 0
DIARRHEA: 0
NAUSEA: 0
VOMITING: 0
EYE DISCHARGE: 0
TROUBLE SWALLOWING: 0
CONSTIPATION: 0
EYE REDNESS: 0
ABDOMINAL PAIN: 0
BLOOD IN STOOL: 0
SHORTNESS OF BREATH: 0

## 2020-01-21 ASSESSMENT — PATIENT HEALTH QUESTIONNAIRE - PHQ9
SUM OF ALL RESPONSES TO PHQ QUESTIONS 1-9: 0
SUM OF ALL RESPONSES TO PHQ9 QUESTIONS 1 & 2: 0
SUM OF ALL RESPONSES TO PHQ QUESTIONS 1-9: 0
1. LITTLE INTEREST OR PLEASURE IN DOING THINGS: 0
2. FEELING DOWN, DEPRESSED OR HOPELESS: 0

## 2020-01-21 NOTE — PROGRESS NOTES
HPI Notes    Name: Kathy Bustamante  : 1990        Chief Complaint:     Chief Complaint   Patient presents with    Hypertension     Pt presents today for follow up. 19 Pt to increase exercise and work on weight loss. History of Present Illness:     Kathy Bustamante is a 26 y.o.  male who presents with Hypertension (Pt presents today for follow up. 19 Pt to increase exercise and work on weight loss.)      Hypertension   This is a chronic problem. The current episode started more than 1 year ago. The problem is unchanged. The problem is controlled. Pertinent negatives include no chest pain, headaches, palpitations or shortness of breath. Associated agents: Pt states he is alcoholic. Pt states he finally admited it. After [place], early January pt went to a 1 1/2 wk detox place & has stopped drinking. Pt is going AA mtgs as he WAS drinking daily. Risk factors for coronary artery disease include male gender and obesity. Past treatments include nothing. Past Medical History:     Past Medical History:   Diagnosis Date    Anxiety     Heart palpitations     Hypertension       Reviewed all health maintenance requirements and ordered appropriate tests  Health Maintenance Due   Topic Date Due    Varicella Vaccine (1 of 2 - 2-dose childhood series) 1990    HIV screen  2004    Flu vaccine (1) 2019       Past Surgical History:     Past Surgical History:   Procedure Laterality Date    CYST REMOVAL  2010    left wrist        Medications:       Prior to Admission medications    Medication Sig Start Date End Date Taking?  Authorizing Provider   Theanine 50 MG TBDP Take by mouth daily   Yes Historical Provider, MD   Omeprazole Magnesium (PRILOSEC OTC PO) Take by mouth daily   Yes Historical Provider, MD   magnesium oxide (MAG-OX) 400 MG tablet Take 100 mg by mouth daily Pt takes 2 tablets daily   Yes Historical Provider, MD   citalopram (CELEXA) 20 MG tablet take 1 tablet by mouth daily 11/13/19  Yes Aly Wong MD   vitamin B-12 (CYANOCOBALAMIN) 100 MCG tablet Take 50 mcg by mouth daily   Yes Historical Provider, MD   Cholecalciferol (VITAMIN D3) 5000 UNITS TABS Pt taking 2 tablet daily   Yes Historical Provider, MD        Allergies:       Patient has no known allergies. Social History:     Tobacco:    reports that he quit smoking about 8 years ago. He has quit using smokeless tobacco.  Alcohol:      reports no history of alcohol use. Drug Use:  reports no history of drug use. Family History:     Family History   Problem Relation Age of Onset    High Cholesterol Father        Review of Systems:       Review of Systems   Constitutional: Negative for chills, fatigue and fever. HENT: Negative for congestion and trouble swallowing. Eyes: Negative for discharge, redness and visual disturbance. Respiratory: Negative for cough and shortness of breath. Cardiovascular: Negative for chest pain and palpitations. Gastrointestinal: Negative for abdominal pain, blood in stool, constipation, diarrhea, nausea and vomiting. Genitourinary: Negative for dysuria and hematuria. Skin: Negative for pallor and rash. Neurological: Negative for dizziness and headaches. Physical Exam:     Physical Exam  Vitals signs reviewed. Constitutional:       General: He is not in acute distress. Appearance: He is well-developed. He is not ill-appearing. HENT:      Head: Normocephalic and atraumatic. Eyes:      General:         Right eye: No discharge. Left eye: No discharge. Conjunctiva/sclera: Conjunctivae normal.      Pupils: Pupils are equal, round, and reactive to light. Neck:      Musculoskeletal: Neck supple. Thyroid: No thyromegaly. Cardiovascular:      Rate and Rhythm: Normal rate and regular rhythm. Heart sounds: No murmur. Pulmonary:      Effort: Pulmonary effort is normal. No respiratory distress.       Breath sounds: Normal breath sounds. Abdominal:      General: Bowel sounds are normal. There is no distension. Palpations: Abdomen is soft. Tenderness: There is no tenderness. Skin:     Findings: No rash. Neurological:      Mental Status: He is alert. Psychiatric:         Mood and Affect: Mood normal.         Behavior: Behavior normal.         Vitals:  /84   Pulse 90   Wt 258 lb (117 kg)   SpO2 98%   BMI 38.10 kg/m²       Data:     Lab Results   Component Value Date     08/03/2012    K 3.9 08/03/2012     08/03/2012    CO2 29 08/03/2012    BUN 13 08/03/2012    CREATININE 1.02 08/03/2012    GLUCOSE 93 09/29/2016    LABALBU 5.0 08/03/2012    BILITOT 0.93 08/03/2012    ALKPHOS 72 08/03/2012    AST 20 08/03/2012    ALT 25 08/03/2012     Lab Results   Component Value Date    WBC 7.1 08/03/2012    RBC 5.48 08/03/2012    HGB 16.3 08/03/2012    HCT 47.3 08/03/2012    MCV 86.4 08/03/2012    MCH 29.8 08/03/2012    MCHC 34.5 08/03/2012    RDW 12.4 08/03/2012     08/03/2012    MPV NOT REPORTED 08/03/2012     Lab Results   Component Value Date    TSH 1.15 08/03/2012     Lab Results   Component Value Date    CHOL 233 09/29/2016    HDL 45 09/29/2016          Assessment/Plan:        1. Elevated BP without diagnosis of hypertension  Improved to normal and pt is doing better. Joined LEVELS and continue no alcohol         Caryle Neither received counseling on the following healthy behaviors: nutrition and exercise  Reviewed prior labs and health maintenance  Continue current medications, diet and exercise. Discussed use, benefit, and side effects of prescribed medications. Barriers to medication compliance addressed. Patient given educational materials - see patient instructions  Was a self-tracking handout given in paper form or via [employer]? Yes    Requested Prescriptions      No prescriptions requested or ordered in this encounter       All patient questions answered.   Patient voiced

## 2020-01-22 ENCOUNTER — TELEPHONE (OUTPATIENT)
Dept: FAMILY MEDICINE CLINIC | Age: 30
End: 2020-01-22

## 2020-04-15 ENCOUNTER — OFFICE VISIT (OUTPATIENT)
Dept: PRIMARY CARE CLINIC | Age: 30
End: 2020-04-15
Payer: COMMERCIAL

## 2020-04-15 ENCOUNTER — HOSPITAL ENCOUNTER (OUTPATIENT)
Age: 30
Setting detail: SPECIMEN
Discharge: HOME | End: 2020-04-15
Payer: COMMERCIAL

## 2020-04-15 VITALS
WEIGHT: 272 LBS | HEART RATE: 78 BPM | TEMPERATURE: 98.4 F | BODY MASS INDEX: 40.17 KG/M2 | DIASTOLIC BLOOD PRESSURE: 90 MMHG | OXYGEN SATURATION: 97 % | SYSTOLIC BLOOD PRESSURE: 134 MMHG | RESPIRATION RATE: 16 BRPM

## 2020-04-15 PROCEDURE — 99213 OFFICE O/P EST LOW 20 MIN: CPT | Performed by: NURSE PRACTITIONER

## 2020-04-15 PROCEDURE — U0002 COVID-19 LAB TEST NON-CDC: HCPCS

## 2020-04-15 RX ORDER — ASCORBIC ACID 500 MG
500 TABLET ORAL DAILY
COMMUNITY

## 2020-04-15 NOTE — PROGRESS NOTES
Yanira Blackwood [facility]  1990    Chief Complaint   Patient presents with    Nasal Congestion     Patient complains of nasal congestion that started 2 days ago.  Shortness of Breath     Patient complains of shortness of breath.  Concern For COVID-19     Patient is concerned about covid, due to traveling to [place] for work. Respiratory Symptoms:  Patient complains of 2 day(s) history of headache, clear nasal discharge, sneezing, shortness of breath, non-productive cough, he said is very slight and diarrhea. Symptoms have been unchanged with time. He denies any other symptoms . Relevant PMH: No pertinent PMH. Smoking history:  He  reports that he quit smoking about 8 years ago. He has quit using smokeless tobacco.     He has had no known ill contacts. Treatment to date: oral decongestant, NSAID. Travel screen completed:  Yes        HPI:  Started 2 days ago with dry cough, nasal congestion with clear nasal drainage, headache, shortness of breath, sneezing and a little diarrhea. Denies any fever, chills, sweats or body aches. Denies chest pain, chest tightness or wheezing. Denies sore throat and sinus pain/pressure. Denies nausea or vomiting. Taking oral decongestant and ibuprofen with minimal relief. 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 Vitals:    04/15/20 1058 04/15/20 1104   BP: (!) 144/92 (!) 134/90   Site: Right Upper Arm Left Upper Arm   Position: Sitting    Cuff Size: Large Adult    Pulse: 78    Temp: 98.4 °F (36.9 °C)    TempSrc: Oral    SpO2: 97%    Weight: 272 lb (123.4 kg)       Physical Exam  Vitals signs and nursing note reviewed. Constitutional:       General: He is not in acute distress. Appearance: Normal appearance. He is well-developed. He is not ill-appearing or diaphoretic. HENT:      Head: Normocephalic and atraumatic.       Right Ear: Hearing, ear canal Neurological:      Mental Status: He is alert and oriented to person, place, and time. Psychiatric:         Behavior: Behavior normal. Behavior is cooperative. Assessment/Plan:  1. Suspected COVID-19 virus infection  - Covid-19 Ambulatory; Future    · Practice meticulous handwashing and cover cough to prevent spread of infection. · Advised to quarantine self at home until receives results from COVID-19 - will call with results. · Do not return to work or school until symptoms have resolved and no fever for 72 hrs without medication. · PUI (Person Under Investigation) form completed and sent to [facility] Department. · Get Well Loop initiated and scheduled for virtual visit with Dr. Osmani Flores on Monday, April 20th. · Encouraged to increase fluids and rest  · Tylenol OTC PRN for pain, discomfort or fever as directed on package  · Cool mist humidifier  · Hot tea with honey and lemon for cough PRN  · Patient instructions given for suspected Covid 19 infection. .  · To ER or call 911 if any increasing difficulty breathing, shortness of breath, inability to swallow, hives, rash, facial/tongue swelling or temp greater than 103 degrees. · Follow up with PCP or Flu Clinic as needed if symptoms worsen or do not improve. Chuck Loredo, MICHEAL - CNP  4/15/20     This visit was provided as a focused evaluation during the COVID -19 pandemic/national emergency. A comprehensive review of all previous patient history and testing was not conducted. Pertinent findings were elicited during the visit.

## 2020-04-15 NOTE — PATIENT INSTRUCTIONS
· You pass out (lose consciousness) or are very hard to wake up.     Call your doctor now or seek immediate medical care if:    · You have moderate trouble breathing. (You can't speak a full sentence.)     · You are coughing up blood (more than about 1 teaspoon).     · You have signs of low blood pressure. These include feeling lightheaded; being too weak to stand; and having cold, pale, clammy skin.    Watch closely for changes in your health, and be sure to contact your doctor if:    · Your symptoms get worse.     · You are not getting better as expected.     Call before you go to the doctor's office. Follow their instructions. And wear a face mask if you have one. Current as of: April 1, 2020               Content Version: 12.4  © 8895-3251 Healthwise, Incorporated. Care instructions adapted under license by your healthcare professional. If you have questions about a medical condition or this instruction, always ask your healthcare professional. [place] [address] any warranty or liability for your use of this information. · Practice meticulous handwashing and cover cough to prevent spread of infection. · Advised to quarantine self at home until receives results from COVID-19 - will call with results. · Do not return to work or school until symptoms have resolved and no fever for 72 hrs without medication. · PUI (Person Under Investigation) form completed and sent to [facility] Department. · Get Well Loop initiated and scheduled for virtual visit with Dr. Mahnaz Roberson. · Encouraged to increase fluids and rest  · Tylenol OTC PRN for pain, discomfort or fever as directed on package  · Cool mist humidifier  · Hot tea with honey and lemon for cough PRN  · Patient instructions given for suspected Covid 19 infection. .  · To ER or call 911 if any increasing difficulty breathing, shortness of breath, inability to swallow, hives, rash, facial/tongue swelling or temp greater than 103 degrees. · Follow up with PCP or Flu Clinic as needed if symptoms worsen or do not improve.

## 2020-04-17 ENCOUNTER — TELEPHONE (OUTPATIENT)
Dept: FAMILY MEDICINE CLINIC | Age: 30
End: 2020-04-17

## 2020-04-17 LAB — SARS-COV-2, NAA: NOT DETECTED

## 2020-04-20 ENCOUNTER — TELEMEDICINE (OUTPATIENT)
Dept: FAMILY MEDICINE CLINIC | Age: 30
End: 2020-04-20
Payer: COMMERCIAL

## 2020-04-20 PROCEDURE — 99213 OFFICE O/P EST LOW 20 MIN: CPT | Performed by: FAMILY MEDICINE

## 2020-04-20 ASSESSMENT — ENCOUNTER SYMPTOMS
COUGH: 0
DIARRHEA: 0
SHORTNESS OF BREATH: 0
VOMITING: 0
FACIAL SWELLING: 0
EYE DISCHARGE: 0
WHEEZING: 0
EYE REDNESS: 0

## 2020-04-20 NOTE — PROGRESS NOTES
Virtual video    HPI Notes    Name: Gelacio Farley  : 1990        Chief Complaint:     Chief Complaint   Patient presents with    Concern For COVID-19       History of Present Illness:     Gelacio Farley is a 26 y.o.  male who presents with Concern For COVID-19      HPI  Covid concern - Pt was seen in our [facility] walk in last week 4/15. Pt was feeling congestion and stuffiness. Pt has no fever. No cough. Pt felt maybe some SOB at the time. 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 Pt was concerned. Pt's test is NEGATIVE and pt states is symptoms are all gone. Past Medical History:     Past Medical History:   Diagnosis Date    Anxiety     Heart palpitations     Hypertension       Reviewed all health maintenance requirements and ordered appropriate tests  Health Maintenance Due   Topic Date Due    Varicella vaccine (1 of 2 - 2-dose childhood series) 1990    HIV screen  2004       Past Surgical History:     Past Surgical History:   Procedure Laterality Date    CYST REMOVAL  2010    left wrist        Medications:       Prior to Admission medications    Medication Sig Start Date End Date Taking?  Authorizing Provider   vitamin C (ASCORBIC ACID) 500 MG tablet Take 500 mg by mouth daily   Yes Historical Provider, MD   Omeprazole Magnesium (PRILOSEC OTC PO) Take by mouth daily   Yes Historical Provider, MD   magnesium oxide (MAG-OX) 400 MG tablet Take 100 mg by mouth daily Pt takes 2 tablets daily   Yes Historical Provider, MD   citalopram (CELEXA) 20 MG tablet take 1 tablet by mouth daily 19  Yes Jesika Valenzuela MD   vitamin B-12 (CYANOCOBALAMIN) 100 MCG tablet Take 50 mcg by mouth daily   Yes Historical Provider, MD   Cholecalciferol (VITAMIN D3) 5000 UNITS TABS Pt taking 2 tablet daily   Yes Historical Provider, MD        Allergies:       Patient has no known allergies. Social History:     Tobacco:    reports that he quit smoking about 8 years ago. He has quit using smokeless tobacco.  Alcohol:      reports no history of alcohol use. Drug Use:  reports no history of drug use. Family History:     Family History   Problem Relation Age of Onset    High Cholesterol Father        Review of Systems:       Review of Systems   Constitutional: Negative for chills and fever. HENT: Negative for congestion and facial swelling. Eyes: Negative for discharge and redness. Respiratory: Negative for cough, shortness of breath and wheezing. Cardiovascular: Negative for chest pain. Gastrointestinal: Negative for diarrhea and vomiting. Skin: Negative for rash. Physical Exam:     Physical Exam  Vitals signs reviewed. Constitutional:       General: He is not in acute distress. Appearance: Normal appearance. He is not ill-appearing. HENT:      Head: Normocephalic and atraumatic. Pulmonary:      Effort: Pulmonary effort is normal.   Neurological:      General: No focal deficit present. Mental Status: He is alert and oriented to person, place, and time. Psychiatric:         Mood and Affect: Mood normal.         Behavior: Behavior normal.         Vitals: There were no vitals taken for this visit.       Data:     Lab Results   Component Value Date     01/21/2020    K 4.2 01/21/2020     01/21/2020    CO2 25 01/21/2020    BUN 17 01/21/2020    CREATININE 0.87 01/21/2020    GLUCOSE 103 01/21/2020    PROT 7.7 01/21/2020    LABALBU 4.8 01/21/2020    BILITOT 0.54 01/21/2020    ALKPHOS 59 01/21/2020    AST 19 01/21/2020    ALT 36 01/21/2020     Lab Results   Component Value Date    WBC 7.1 08/03/2012    RBC 5.48 08/03/2012    HGB 16.3 08/03/2012    HCT 47.3 08/03/2012    MCV 86.4 08/03/2012    MCH 29.8 08/03/2012    MCHC 34.5 08/03/2012    RDW 12.4 08/03/2012     08/03/2012    MPV NOT REPORTED 08/03/2012     Lab Results   Component Value

## 2020-05-12 ENCOUNTER — OFFICE VISIT (OUTPATIENT)
Dept: FAMILY MEDICINE CLINIC | Age: 30
End: 2020-05-12
Payer: COMMERCIAL

## 2020-05-12 VITALS
OXYGEN SATURATION: 98 % | SYSTOLIC BLOOD PRESSURE: 110 MMHG | DIASTOLIC BLOOD PRESSURE: 80 MMHG | HEART RATE: 110 BPM | HEIGHT: 69 IN | WEIGHT: 268 LBS | BODY MASS INDEX: 39.69 KG/M2

## 2020-05-12 PROCEDURE — 99213 OFFICE O/P EST LOW 20 MIN: CPT | Performed by: FAMILY MEDICINE

## 2020-05-12 RX ORDER — CITALOPRAM 20 MG/1
TABLET ORAL
Qty: 90 TABLET | Refills: 1 | Status: SHIPPED | OUTPATIENT
Start: 2020-05-12 | End: 2020-11-12 | Stop reason: SDUPTHER

## 2020-05-12 ASSESSMENT — ENCOUNTER SYMPTOMS
EYE REDNESS: 0
VOMITING: 0
COUGH: 0
EYE DISCHARGE: 0
SHORTNESS OF BREATH: 0
DIARRHEA: 0

## 2020-05-12 NOTE — PATIENT INSTRUCTIONS
SURVEY:    You may be receiving a survey from [employer] regarding your visit today. Please complete the survey to enable us to provide the highest quality of care to you and your family. If you cannot score us a very good (5 stars) on any question, please call the office to discuss how we could have made your experience a very good one. Thank you.     Clinical Care Team:  MD Jailene Corona LPN    Clerical Team:  Bryanna Mendoza

## 2020-05-12 NOTE — PROGRESS NOTES
daily   Yes Historical Provider, MD   magnesium oxide (MAG-OX) 400 MG tablet Take 100 mg by mouth daily Pt takes 2 tablets daily   Yes Historical Provider, MD   vitamin B-12 (CYANOCOBALAMIN) 100 MCG tablet Take 50 mcg by mouth daily   Yes Historical Provider, MD   Cholecalciferol (VITAMIN D3) 5000 UNITS TABS Pt taking 2 tablet daily   Yes Historical Provider, MD        Allergies:       Patient has no known allergies. Social History:     Tobacco:    reports that he quit smoking about 8 years ago. He has quit using smokeless tobacco.  Alcohol:      reports no history of alcohol use. Drug Use:  reports no history of drug use. Family History:     Family History   Problem Relation Age of Onset    High Cholesterol Father        Review of Systems:       Review of Systems   Constitutional: Negative for chills, fever and malaise/fatigue. Eyes: Negative for discharge and redness. Respiratory: Negative for cough and shortness of breath. Cardiovascular: Negative for chest pain and palpitations. Gastrointestinal: Negative for diarrhea and vomiting. Skin: Negative for pallor and rash. Neurological: Negative for dizziness and facial asymmetry. Physical Exam:     Physical Exam  Vitals signs reviewed. Constitutional:       Appearance: He is well-developed. HENT:      Head: Normocephalic and atraumatic. Eyes:      General:         Right eye: No discharge. Left eye: No discharge. Conjunctiva/sclera: Conjunctivae normal.      Pupils: Pupils are equal, round, and reactive to light. Neck:      Musculoskeletal: Neck supple. Thyroid: No thyromegaly. Cardiovascular:      Rate and Rhythm: Normal rate and regular rhythm. Heart sounds: No murmur. Pulmonary:      Effort: Pulmonary effort is normal. No respiratory distress. Breath sounds: Normal breath sounds. Abdominal:      General: Bowel sounds are normal. There is no distension. Palpations: Abdomen is soft.

## 2020-06-20 ENCOUNTER — OFFICE VISIT (OUTPATIENT)
Dept: PRIMARY CARE CLINIC | Age: 30
End: 2020-06-20
Payer: COMMERCIAL

## 2020-06-20 VITALS
HEART RATE: 80 BPM | DIASTOLIC BLOOD PRESSURE: 76 MMHG | OXYGEN SATURATION: 96 % | SYSTOLIC BLOOD PRESSURE: 126 MMHG | WEIGHT: 261 LBS | TEMPERATURE: 98.3 F | BODY MASS INDEX: 38.54 KG/M2

## 2020-06-20 PROCEDURE — 99213 OFFICE O/P EST LOW 20 MIN: CPT | Performed by: NURSE PRACTITIONER

## 2020-06-20 RX ORDER — PREDNISONE 10 MG/1
TABLET ORAL
Qty: 54 TABLET | Refills: 0 | Status: SHIPPED | OUTPATIENT
Start: 2020-06-20 | End: 2020-07-04

## 2020-06-20 ASSESSMENT — ENCOUNTER SYMPTOMS
SHORTNESS OF BREATH: 0
VOMITING: 0
RHINORRHEA: 0
COUGH: 0

## 2020-06-20 NOTE — PROGRESS NOTES
[facility] W[facility]-IN [facility]  [address] [place] [id number]  Dept: [phone]  Dept Fax: [phone]    Robin Forte is a 26 y.o. male who presents to the [address] [place] [address] in Care today for his medical conditions/complaints as noted below. Jeff Rosen c/o of [employer] (Patient here today with poison ivy on neck and arms. Started yesterday. )      HPI:   Poison Laura Cull   This is a new problem. The current episode started yesterday. The problem has been rapidly worsening since onset. The affected locations include the neck, right arm and left arm (Concerned that neck rash is rapidly spreading to face). The rash is characterized by redness and itchiness. He was exposed to plant contact (Was cutting a tree). Pertinent negatives include no cough, facial edema, fever, joint pain, rhinorrhea, shortness of breath or vomiting. Past treatments include nothing. Past Medical History:   Diagnosis Date    Anxiety     Heart palpitations     Hypertension         Current Outpatient Medications   Medication Sig Dispense Refill    predniSONE (DELTASONE) 10 MG tablet Take 6 tablets by mouth daily for 4 days, THEN 5 tablets daily for 2 days, THEN 4 tablets daily for 2 days, THEN 3 tablets daily for 2 days, THEN 2 tablets daily for 2 days, THEN 1 tablet daily for 2 days. 54 tablet 0    citalopram (CELEXA) 20 MG tablet take 1 tablet by mouth daily 90 tablet 1    vitamin C (ASCORBIC ACID) 500 MG tablet Take 500 mg by mouth daily      Omeprazole Magnesium (PRILOSEC OTC PO) Take by mouth daily      magnesium oxide (MAG-OX) 400 MG tablet Take 100 mg by mouth daily Pt takes 2 tablets daily      vitamin B-12 (CYANOCOBALAMIN) 100 MCG tablet Take 50 mcg by mouth daily      Cholecalciferol (VITAMIN D3) 5000 UNITS TABS Pt taking 2 tablet daily       No current facility-administered medications for this visit.       No Known Allergies    Subjective:      Review of Systems

## 2020-06-20 NOTE — PATIENT INSTRUCTIONS
Pre-Contact Skin Solution, come in lotions, sprays, or towelettes. You put the product on your skin right before you go outdoors. · If you did not use a preventive product and you have had contact with plant oil, clean it off your skin as soon as possible. Use a product such as [employer] Original Outdoor Skin Cleanser. These products can also be used to clean plant oil from clothing or tools. When should you call for help? Call your doctor now or seek immediate medical care if:  · Your rash gets worse, and you start to feel bad and have a fever, a stiff neck, nausea, and vomiting. · You have signs of infection, such as:  ? Increased pain, swelling, warmth, or redness. ? Red streaks leading from the rash. ? Pus draining from the rash. ? A fever. Watch closely for changes in your health, and be sure to contact your doctor if:  · You have new blisters or bruises, or the rash spreads and looks like a sunburn. · The rash gets worse, or it comes back after nearly disappearing. · You think a medicine you are using is making your rash worse. · Your rash does not clear up after 1 to 2 weeks of home treatment. · You have joint aches or body aches with your rash. Where can you learn more? Go to [email or web address] and sign in to your [employer] account. Enter M836 in the [facility] box to learn more about \"Poison [place], [place], and [place]: Care Instructions. \"     If you do not have an account, please click on the \"Sign Up Now\" link. Current as of: October 31, 2019               Content Version: 12.5  © 4720-6557 Healthwise, Incorporated. Care instructions adapted under license by [facility]). If you have questions about a medical condition or this instruction, always ask your healthcare professional. [address] any warranty or liability for your use of this information.

## 2020-07-09 ENCOUNTER — OFFICE VISIT (OUTPATIENT)
Dept: FAMILY MEDICINE CLINIC | Age: 30
End: 2020-07-09
Payer: COMMERCIAL

## 2020-07-09 VITALS
DIASTOLIC BLOOD PRESSURE: 78 MMHG | SYSTOLIC BLOOD PRESSURE: 120 MMHG | OXYGEN SATURATION: 98 % | HEART RATE: 74 BPM | WEIGHT: 258 LBS | BODY MASS INDEX: 38.1 KG/M2

## 2020-07-09 PROCEDURE — 99213 OFFICE O/P EST LOW 20 MIN: CPT | Performed by: FAMILY MEDICINE

## 2020-07-09 RX ORDER — ALPRAZOLAM 0.5 MG/1
0.5 TABLET ORAL NIGHTLY PRN
Qty: 30 TABLET | Refills: 0 | Status: SHIPPED | OUTPATIENT
Start: 2020-07-09 | End: 2020-11-12 | Stop reason: SDUPTHER

## 2020-07-09 ASSESSMENT — ENCOUNTER SYMPTOMS
DIARRHEA: 0
FACIAL SWELLING: 0
VOMITING: 0
EYE REDNESS: 0
COUGH: 0
SHORTNESS OF BREATH: 0
EYE DISCHARGE: 0

## 2020-07-09 NOTE — PROGRESS NOTES
HPI Notes    Name: Princess Jones  : 1990        Chief Complaint:     Chief Complaint   Patient presents with    Anxiety     chronic but stable on celexa daily as directed and Xanax as needed    Hypertension     Pt is not taking BP medication at this time       History of Present Illness:     Princess Jones is a 26 y.o.  male who presents with Anxiety (chronic but stable on celexa daily as directed and Xanax as needed) and Hypertension (Pt is not taking BP medication at this time)      Hypertension   This is a chronic problem. The current episode started more than 1 year ago. The problem is unchanged. The problem is controlled. Pertinent negatives include no chest pain, malaise/fatigue, palpitations, peripheral edema or shortness of breath. There are no associated agents to hypertension. Risk factors for coronary artery disease include male gender. Treatments tried: no medication for BP at this time. The current treatment provides significant improvement. Anxiety - chronic but stable. Pt is taking the celexa daily. 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 Pt states being busy he does have some anxiety so would like to have the xanax on hand as a back up. Pt has not had any xanax for awhile but likes to know he has some. Pt is sleeping well and takes the CBD oil to help. Past Medical History:     Past Medical History:   Diagnosis Date    Anxiety     Heart palpitations     Hypertension       Reviewed all health maintenance requirements and ordered appropriate tests  Health Maintenance Due   Topic Date Due    Varicella vaccine (1 of 2 - 2-dose childhood series) 1990    HIV screen  2004       Past Surgical History:     Past Surgical History:   Procedure Laterality Date    CYST REMOVAL  2010    left wrist        Medications:       Prior to Admission medications    Medication Sig Start Date End Date Taking?  Authorizing Provider   ALPRAZolam Sherita Fro) 0.5 MG tablet Take 1 tablet by mouth nightly as needed for Anxiety for up to 30 days. 7/9/20 8/8/20 Yes Soumya Leblanc MD   citalopram (CELEXA) 20 MG tablet take 1 tablet by mouth daily 5/12/20  Yes Soumya Leblanc MD   vitamin C (ASCORBIC ACID) 500 MG tablet Take 500 mg by mouth daily   Yes Historical Provider, MD   Omeprazole Magnesium (PRILOSEC OTC PO) Take by mouth daily   Yes Historical Provider, MD   magnesium oxide (MAG-OX) 400 MG tablet Take 100 mg by mouth daily Pt takes 2 tablets daily   Yes Historical Provider, MD   vitamin B-12 (CYANOCOBALAMIN) 100 MCG tablet Take 50 mcg by mouth daily   Yes Historical Provider, MD   Cholecalciferol (VITAMIN D3) 5000 UNITS TABS Pt taking 2 tablet daily    Historical Provider, MD        Allergies:       Patient has no known allergies. Social History:     Tobacco:    reports that he quit smoking about 8 years ago. He has quit using smokeless tobacco.  Alcohol:      reports no history of alcohol use. Drug Use:  reports no history of drug use. Family History:     Family History   Problem Relation Age of Onset    High Cholesterol Father        Review of Systems:       Review of Systems   Constitutional: Negative for chills, fever and malaise/fatigue. HENT: Negative for congestion and facial swelling. Eyes: Negative for discharge and redness. Respiratory: Negative for cough and shortness of breath. Cardiovascular: Negative for chest pain and palpitations. Gastrointestinal: Negative for diarrhea and vomiting. Skin: Negative for rash. Neurological: Negative for dizziness and facial asymmetry. Psychiatric/Behavioral: Negative for dysphoric mood and sleep disturbance. The patient is nervous/anxious. Physical Exam:     Physical Exam  Vitals signs reviewed. Constitutional:       General: He is not in acute distress. Appearance: He is well-developed. He is not ill-appearing. HENT:      Head: Normocephalic and atraumatic.    Eyes:      General:         Right eye: No discharge. Left eye: No discharge. Conjunctiva/sclera: Conjunctivae normal.      Pupils: Pupils are equal, round, and reactive to light. Neck:      Musculoskeletal: Neck supple. Thyroid: No thyromegaly. Cardiovascular:      Rate and Rhythm: Normal rate and regular rhythm. Heart sounds: No murmur. Pulmonary:      Effort: Pulmonary effort is normal.      Breath sounds: Normal breath sounds. Abdominal:      General: There is no distension. Palpations: Abdomen is soft. Tenderness: There is no abdominal tenderness. Musculoskeletal:      Right lower leg: No edema. Left lower leg: No edema. Neurological:      Mental Status: He is alert. Psychiatric:         Mood and Affect: Mood normal.         Behavior: Behavior normal.         Thought Content: Thought content normal.         Vitals:  /78   Pulse 74   Wt 258 lb (117 kg)   SpO2 98%   BMI 38.10 kg/m²       Data:     Lab Results   Component Value Date     01/21/2020    K 4.2 01/21/2020     01/21/2020    CO2 25 01/21/2020    BUN 17 01/21/2020    CREATININE 0.87 01/21/2020    GLUCOSE 103 01/21/2020    PROT 7.7 01/21/2020    LABALBU 4.8 01/21/2020    BILITOT 0.54 01/21/2020    ALKPHOS 59 01/21/2020    AST 19 01/21/2020    ALT 36 01/21/2020     Lab Results   Component Value Date    WBC 7.1 08/03/2012    RBC 5.48 08/03/2012    HGB 16.3 08/03/2012    HCT 47.3 08/03/2012    MCV 86.4 08/03/2012    MCH 29.8 08/03/2012    MCHC 34.5 08/03/2012    RDW 12.4 08/03/2012     08/03/2012    MPV NOT REPORTED 08/03/2012     Lab Results   Component Value Date    TSH 1.15 08/03/2012     Lab Results   Component Value Date    CHOL 263 01/21/2020    HDL 43 01/21/2020          Assessment/Plan:        1. Essential hypertension  Stable on diet control and doing well. No more alcohol use    2. Anxiety  Stable on celexa and only uses the xanax PRN  - ALPRAZolam (XANAX) 0.5 MG tablet;  Take 1 tablet by mouth nightly as needed for Anxiety for up to 30 days. Dispense: 30 tablet; Refill: 0        Jeff received counseling on the following healthy behaviors: nutrition and exercise  Reviewed prior labs and health maintenance  Continue current medications, diet and exercise. Discussed use, benefit, and side effects of prescribed medications. Barriers to medication compliance addressed. Patient given educational materials - see patient instructions  Was a self-tracking handout given in paper form or via [employer]? Yes    Requested Prescriptions     Signed Prescriptions Disp Refills    ALPRAZolam (XANAX) 0.5 MG tablet 30 tablet 0     Sig: Take 1 tablet by mouth nightly as needed for Anxiety for up to 30 days. All patient questions answered. Patient voiced understanding. Quality Measures    Body mass index is 38.1 kg/m². Elevated. Weight control planned discussed Healthy diet and regular exercise. BP: 120/78 Blood pressure is normal. Treatment plan consists of No treatment change needed. Lab Results   Component Value Date    LDLCHOLESTEROL 166 (H) 01/21/2020    (goal LDL reduction with dx if diabetes is 50% LDL reduction)      PHQ Scores 1/21/2020 5/13/2019 5/16/2018   PHQ2 Score 0 0 0   PHQ9 Score 0 0 0     Interpretation of Total Score Depression Severity: 1-4 = Minimal depression, 5-9 = Mild depression, 10-14 = Moderate depression, 15-19 = Moderately severe depression, 20-27 = Severe depression      Return for HTN, Anxiety.       Electronically signed by Arlyn Rivera MD on 7/9/2020 at 11:44 AM

## 2020-11-12 ENCOUNTER — OFFICE VISIT (OUTPATIENT)
Dept: FAMILY MEDICINE CLINIC | Age: 30
End: 2020-11-12
Payer: COMMERCIAL

## 2020-11-12 VITALS
DIASTOLIC BLOOD PRESSURE: 70 MMHG | SYSTOLIC BLOOD PRESSURE: 120 MMHG | BODY MASS INDEX: 38.99 KG/M2 | WEIGHT: 264 LBS | HEART RATE: 100 BPM | OXYGEN SATURATION: 98 %

## 2020-11-12 PROCEDURE — 99213 OFFICE O/P EST LOW 20 MIN: CPT | Performed by: FAMILY MEDICINE

## 2020-11-12 RX ORDER — ALPRAZOLAM 0.5 MG/1
0.5 TABLET ORAL NIGHTLY PRN
Qty: 30 TABLET | Refills: 0 | Status: SHIPPED | OUTPATIENT
Start: 2020-11-12 | End: 2021-04-22 | Stop reason: SDUPTHER

## 2020-11-12 RX ORDER — CITALOPRAM 20 MG/1
TABLET ORAL
Qty: 90 TABLET | Refills: 1 | Status: SHIPPED | OUTPATIENT
Start: 2020-11-12 | End: 2021-06-29

## 2020-11-12 ASSESSMENT — ENCOUNTER SYMPTOMS
BLURRED VISION: 0
BLOOD IN STOOL: 0
CONSTIPATION: 0
NAUSEA: 0
SHORTNESS OF BREATH: 0
DIARRHEA: 0
EYE DISCHARGE: 0
EYE REDNESS: 0
VOMITING: 0
COUGH: 0
TROUBLE SWALLOWING: 0
ABDOMINAL PAIN: 0

## 2020-11-12 NOTE — PATIENT INSTRUCTIONS
SURVEY:    You may be receiving a survey from [employer] regarding your visit today. Please complete the survey to enable us to provide the highest quality of care to you and your family. If you cannot score us a very good (5 stars) on any question, please call the office to discuss how we could have made your experience a very good one. Thank you.     Clinical Care Team:  MD Carl Lantigua LPN    Clerical Team:  Bryanna Hooker

## 2020-11-12 NOTE — PROGRESS NOTES
500 MG tablet Take 500 mg by mouth daily   Yes Historical Provider, MD   Omeprazole Magnesium (PRILOSEC OTC PO) Take by mouth daily   Yes Historical Provider, MD   magnesium oxide (MAG-OX) 400 MG tablet Take 100 mg by mouth daily Pt takes 2 tablets daily   Yes Historical Provider, MD   vitamin B-12 (CYANOCOBALAMIN) 100 MCG tablet Take 50 mcg by mouth daily   Yes Historical Provider, MD   Cholecalciferol (VITAMIN D3) 5000 UNITS TABS Pt taking 2 tablet daily   Yes Historical Provider, MD        Allergies:       Patient has no known allergies. Social History:     Tobacco:    reports that he quit smoking about 9 years ago. He has quit using smokeless tobacco.  Alcohol:      reports no history of alcohol use. Drug Use:  reports no history of drug use. Family History:     Family History   Problem Relation Age of Onset    High Cholesterol Father        Review of Systems:       Review of Systems   Constitutional: Negative for chills, fatigue and fever. HENT: Negative for trouble swallowing. Eyes: Negative for blurred vision, discharge and redness. Respiratory: Negative for cough and shortness of breath. Cardiovascular: Negative for chest pain and palpitations. Gastrointestinal: Negative for abdominal pain, blood in stool, constipation, diarrhea, nausea and vomiting. Genitourinary: Negative for dysuria and hematuria. Musculoskeletal: Negative for joint swelling and neck stiffness. Skin: Negative for pallor and rash. Neurological: Negative for dizziness, light-headedness and headaches. Psychiatric/Behavioral: Negative for confusion and sleep disturbance. Physical Exam:     Physical Exam  Vitals signs reviewed. Constitutional:       General: He is not in acute distress. Appearance: Normal appearance. He is well-developed. He is not ill-appearing. HENT:      Head: Normocephalic and atraumatic. Eyes:      General:         Right eye: No discharge. Left eye: No discharge. Conjunctiva/sclera: Conjunctivae normal.      Pupils: Pupils are equal, round, and reactive to light. Neck:      Musculoskeletal: Neck supple. Thyroid: No thyromegaly. Vascular: No carotid bruit. Cardiovascular:      Rate and Rhythm: Normal rate and regular rhythm. Heart sounds: No murmur. Pulmonary:      Effort: Pulmonary effort is normal.      Breath sounds: Normal breath sounds. Abdominal:      General: Bowel sounds are normal.      Palpations: Abdomen is soft. Tenderness: There is no abdominal tenderness. Musculoskeletal:      Right lower leg: No edema. Left lower leg: No edema. Lymphadenopathy:      Cervical: No cervical adenopathy. Skin:     Findings: No erythema or rash. Neurological:      Mental Status: He is alert and oriented to person, place, and time. Vitals:  /70   Pulse 100   Wt 264 lb (119.7 kg)   SpO2 98%   BMI 38.99 kg/m²       Data:     Lab Results   Component Value Date     01/21/2020    K 4.2 01/21/2020     01/21/2020    CO2 25 01/21/2020    BUN 17 01/21/2020    CREATININE 0.87 01/21/2020    GLUCOSE 103 01/21/2020    PROT 7.7 01/21/2020    LABALBU 4.8 01/21/2020    BILITOT 0.54 01/21/2020    ALKPHOS 59 01/21/2020    AST 19 01/21/2020    ALT 36 01/21/2020     Lab Results   Component Value Date    WBC 7.1 08/03/2012    RBC 5.48 08/03/2012    HGB 16.3 08/03/2012    HCT 47.3 08/03/2012    MCV 86.4 08/03/2012    MCH 29.8 08/03/2012    MCHC 34.5 08/03/2012    RDW 12.4 08/03/2012     08/03/2012    MPV NOT REPORTED 08/03/2012     Lab Results   Component Value Date    TSH 1.15 08/03/2012     Lab Results   Component Value Date    CHOL 263 01/21/2020    HDL 43 01/21/2020          Assessment/Plan:        1. Anxiety  Stable on celexa and PRN xanax. Pt doing well and continue on current medication and work on exercise and diet   - citalopram (CELEXA) 20 MG tablet; take 1 tablet by mouth daily  Dispense: 90 tablet;  Refill: 1  - ALPRAZolam (XANAX) 0.5 MG tablet; Take 1 tablet by mouth nightly as needed for Anxiety for up to 30 days. Dispense: 30 tablet; Refill: 0    2. Essential hypertension  Stable on no medication         Jeff received counseling on the following healthy behaviors: nutrition and exercise  Reviewed prior labs and health maintenance  Continue current medications, diet and exercise. Discussed use, benefit, and side effects of prescribed medications. Barriers to medication compliance addressed. Patient given educational materials - see patient instructions  Was a self-tracking handout given in paper form or via [employer]? Yes    Requested Prescriptions     Pending Prescriptions Disp Refills    citalopram (CELEXA) 20 MG tablet 90 tablet 1     Sig: take 1 tablet by mouth daily    ALPRAZolam (XANAX) 0.5 MG tablet 30 tablet 0     Sig: Take 1 tablet by mouth nightly as needed for Anxiety for up to 30 days. All patient questions answered. Patient voiced understanding. Quality Measures    Body mass index is 38.99 kg/m². Elevated. Weight control planned discussed Healthy diet and regular exercise. BP: 120/70 Blood pressure is normal. Treatment plan consists of No treatment change needed. Lab Results   Component Value Date    LDLCHOLESTEROL 166 (H) 01/21/2020    (goal LDL reduction with dx if diabetes is 50% LDL reduction)      PHQ Scores 1/21/2020 5/13/2019 5/16/2018   PHQ2 Score 0 0 0   PHQ9 Score 0 0 0     Interpretation of Total Score Depression Severity: 1-4 = Minimal depression, 5-9 = Mild depression, 10-14 = Moderate depression, 15-19 = Moderately severe depression, 20-27 = Severe depression      Return in about 6 months (around 5/12/2021) for HTN, Anxiety.       Electronically signed by Yesika Turcios MD on 11/12/2020 at 10:08 AM

## 2020-11-23 ENCOUNTER — OFFICE VISIT (OUTPATIENT)
Dept: FAMILY MEDICINE CLINIC | Age: 30
End: 2020-11-23
Payer: COMMERCIAL

## 2020-11-23 VITALS
BODY MASS INDEX: 39.43 KG/M2 | WEIGHT: 267 LBS | HEART RATE: 72 BPM | DIASTOLIC BLOOD PRESSURE: 70 MMHG | SYSTOLIC BLOOD PRESSURE: 104 MMHG | OXYGEN SATURATION: 98 %

## 2020-11-23 PROCEDURE — 99213 OFFICE O/P EST LOW 20 MIN: CPT | Performed by: FAMILY MEDICINE

## 2020-11-23 RX ORDER — PHENTERMINE HYDROCHLORIDE 37.5 MG/1
37.5 TABLET ORAL
Qty: 30 TABLET | Refills: 0 | Status: SHIPPED | OUTPATIENT
Start: 2020-11-23 | End: 2020-12-23 | Stop reason: SDUPTHER

## 2020-11-23 ASSESSMENT — ENCOUNTER SYMPTOMS
EYE DISCHARGE: 0
DIARRHEA: 0
COUGH: 0
SHORTNESS OF BREATH: 0
NAUSEA: 0
EYE REDNESS: 0
VOMITING: 0

## 2020-11-23 NOTE — PATIENT INSTRUCTIONS
SURVEY:    You may be receiving a survey from [employer] regarding your visit today. Please complete the survey to enable us to provide the highest quality of care to you and your family. If you cannot score us a very good (5 stars) on any question, please call the office to discuss how we could have made your experience a very good one. Thank you.     Clinical Care Team:  MD Amado Barnes LPN    Clerical Team:  Bryanna Watson

## 2020-11-23 NOTE — PROGRESS NOTES
HPI Notes    Name: Mari Berry  : 1990        Chief Complaint:     Chief Complaint   Patient presents with    Obesity     Pt presents today to discuss weight loss, Pt wants to start Adipex. History of Present Illness:     Mari Berry is a 26 y.o.  male who presents with Obesity (Pt presents today to discuss weight loss, Pt wants to start Adipex. )      HPI  Obesity - Pt is here to about weight loss. Pt has joined [facility]. Pt has plans to start working out more. Pt will try to eat better and follow a diet. Pt also wants to try the adipex and work on wt loss. Past Medical History:     Past Medical History:   Diagnosis Date    Anxiety     Heart palpitations     Hypertension       Reviewed all health maintenance requirements and ordered appropriate tests  Health Maintenance Due   Topic Date Due    Varicella vaccine (1 of 2 - 2-dose childhood series) 1990    HIV screen  2004    Flu vaccine (1) 2020       Past Surgical History:     Past Surgical History:   Procedure Laterality Date    CYST REMOVAL  2010    left wrist        Medications:       Prior to Admission medications    Medication Sig Start Date End Date Taking? Authorizing Provider   citalopram (CELEXA) 20 MG tablet take 1 tablet by mouth daily 20  Yes Carole Mac MD   ALPRAZolam Jolie Gather) 0.5 MG tablet Take 1 tablet by mouth nightly as needed for Anxiety for up to 30 days.  20 Yes Carole Mac MD   vitamin C (ASCORBIC ACID) 500 MG tablet Take 500 mg by mouth daily   Yes Historical Provider, MD   Omeprazole Magnesium (PRILOSEC OTC PO) Take by mouth daily   Yes Historical Provider, MD   magnesium oxide (MAG-OX) 400 MG tablet Take 100 mg by mouth daily Pt takes 2 tablets daily   Yes Historical Provider, MD   vitamin B-12 (CYANOCOBALAMIN) 100 MCG tablet Take 50 mcg by mouth daily   Yes Historical Provider, MD   Cholecalciferol (VITAMIN D3) 5000 UNITS TABS Pt taking 2 tablet daily   Yes Historical Provider, MD        Allergies:       Patient has no known allergies. Social History:     Tobacco:    reports that he quit smoking about 9 years ago. He has quit using smokeless tobacco.  Alcohol:      reports no history of alcohol use. Drug Use:  reports no history of drug use. Family History:     Family History   Problem Relation Age of Onset    High Cholesterol Father        Review of Systems:       Review of Systems   Constitutional: Negative for chills and fever. Eyes: Negative for discharge and redness. Respiratory: Negative for cough and shortness of breath. Cardiovascular: Negative for chest pain, palpitations and leg swelling. Gastrointestinal: Negative for diarrhea, nausea and vomiting. Skin: Negative for pallor and rash. Neurological: Negative for dizziness, facial asymmetry and headaches. Physical Exam:     Physical Exam  Vitals signs reviewed. Constitutional:       General: He is not in acute distress. Appearance: Normal appearance. He is not ill-appearing. HENT:      Head: Normocephalic and atraumatic. Eyes:      General:         Right eye: No discharge. Left eye: No discharge. Neck:      Musculoskeletal: Neck supple. No muscular tenderness. Cardiovascular:      Rate and Rhythm: Normal rate and regular rhythm. Heart sounds: Normal heart sounds. No murmur. Pulmonary:      Effort: Pulmonary effort is normal.      Breath sounds: Normal breath sounds. Abdominal:      General: There is no distension. Tenderness: There is no abdominal tenderness. Lymphadenopathy:      Cervical: No cervical adenopathy. Neurological:      Mental Status: He is alert.    Psychiatric:         Mood and Affect: Mood normal.         Behavior: Behavior normal.         Vitals:  /70   Pulse 72   Wt 267 lb (121.1 kg)   SpO2 98%   BMI 39.43 kg/m²       Data:     Lab Results   Component Value Date     01/21/2020    K 4.2 01/21/2020     01/21/2020    CO2 25 01/21/2020    BUN 17 01/21/2020    CREATININE 0.87 01/21/2020    GLUCOSE 103 01/21/2020    PROT 7.7 01/21/2020    LABALBU 4.8 01/21/2020    BILITOT 0.54 01/21/2020    ALKPHOS 59 01/21/2020    AST 19 01/21/2020    ALT 36 01/21/2020     Lab Results   Component Value Date    WBC 7.1 08/03/2012    RBC 5.48 08/03/2012    HGB 16.3 08/03/2012    HCT 47.3 08/03/2012    MCV 86.4 08/03/2012    MCH 29.8 08/03/2012    MCHC 34.5 08/03/2012    RDW 12.4 08/03/2012     08/03/2012    MPV NOT REPORTED 08/03/2012     Lab Results   Component Value Date    TSH 1.15 08/03/2012     Lab Results   Component Value Date    CHOL 263 01/21/2020    HDL 43 01/21/2020          Assessment/Plan:        1. Class 2 obesity due to excess calories without serious comorbidity with body mass index (BMI) of 39.0 to 39.9 in adult  Pt to try adipex and reviewed side effects and benefits of the medication. Yazmin 1mos and pt to start exercising at [facility]. Return in about 4 weeks (around 12/21/2020) for wt check.       Electronically signed by Mj Johnson MD on 11/23/2020 at 8:55 AM

## 2020-12-23 ENCOUNTER — OFFICE VISIT (OUTPATIENT)
Dept: FAMILY MEDICINE CLINIC | Age: 30
End: 2020-12-23
Payer: COMMERCIAL

## 2020-12-23 VITALS
BODY MASS INDEX: 37.77 KG/M2 | SYSTOLIC BLOOD PRESSURE: 139 MMHG | WEIGHT: 255 LBS | HEART RATE: 106 BPM | HEIGHT: 69 IN | OXYGEN SATURATION: 99 % | DIASTOLIC BLOOD PRESSURE: 80 MMHG

## 2020-12-23 PROCEDURE — 99213 OFFICE O/P EST LOW 20 MIN: CPT | Performed by: FAMILY MEDICINE

## 2020-12-23 RX ORDER — PHENTERMINE HYDROCHLORIDE 37.5 MG/1
37.5 TABLET ORAL
Qty: 30 TABLET | Refills: 0 | Status: SHIPPED | OUTPATIENT
Start: 2020-12-23 | End: 2021-01-21 | Stop reason: SDUPTHER

## 2020-12-23 ASSESSMENT — ENCOUNTER SYMPTOMS
VOMITING: 0
SHORTNESS OF BREATH: 0
NAUSEA: 0
COUGH: 0

## 2020-12-23 NOTE — PROGRESS NOTES
HPI Notes    Name: Danielito Sargent  : 1990        Chief Complaint:     Chief Complaint   Patient presents with    Weight Management     1 month on adipex, loss of 12 lbs       History of Present Illness:     Danielito Sargent is a 26 y.o.  male who presents with Weight Management (1 month on adipex, loss of 12 lbs)      HPI  Obesity -  Pt is doing well on the adipex for 1mos. No side effects. Pt has since lost 12lbs. Pt is pleased with the wt loss. Pt trying to [facility] more [facility]. Pt eating these Fresh like meals -- 6 per week. Past Medical History:     Past Medical History:   Diagnosis Date    Anxiety     Heart palpitations     Hypertension       Reviewed all health maintenance requirements and ordered appropriate tests  Health Maintenance Due   Topic Date Due    Hepatitis C screen  1989    Varicella vaccine (1 of 2 - 2-dose childhood series) 1990    HIV screen  2004    Flu vaccine (1) 2020       Past Surgical History:     Past Surgical History:   Procedure Laterality Date    CYST REMOVAL  2010    left wrist        Medications:       Prior to Admission medications    Medication Sig Start Date End Date Taking? Authorizing Provider   phentermine (ADIPEX-P) 37.5 MG tablet Take 1 tablet by mouth every morning (before breakfast) for 30 days.  20 Yes Faviola Carrillo MD   citalopram (CELEXA) 20 MG tablet take 1 tablet by mouth daily 20  Yes Faviola Carrillo MD   vitamin C (ASCORBIC ACID) 500 MG tablet Take 500 mg by mouth daily   Yes Historical Provider, MD   Omeprazole Magnesium (PRILOSEC OTC PO) Take by mouth daily   Yes Historical Provider, MD   magnesium oxide (MAG-OX) 400 MG tablet Take 100 mg by mouth daily Pt takes 2 tablets daily   Yes Historical Provider, MD   vitamin B-12 (CYANOCOBALAMIN) 100 MCG tablet Take 50 mcg by mouth daily   Yes Historical Provider, MD   Cholecalciferol (VITAMIN D3) 5000 UNITS TABS Pt 01/21/2020    GLUCOSE 103 01/21/2020    PROT 7.7 01/21/2020    LABALBU 4.8 01/21/2020    BILITOT 0.54 01/21/2020    ALKPHOS 59 01/21/2020    AST 19 01/21/2020    ALT 36 01/21/2020     Lab Results   Component Value Date    WBC 7.1 08/03/2012    RBC 5.48 08/03/2012    HGB 16.3 08/03/2012    HCT 47.3 08/03/2012    MCV 86.4 08/03/2012    MCH 29.8 08/03/2012    MCHC 34.5 08/03/2012    RDW 12.4 08/03/2012     08/03/2012    MPV NOT REPORTED 08/03/2012     Lab Results   Component Value Date    TSH 1.15 08/03/2012     Lab Results   Component Value Date    CHOL 263 01/21/2020    HDL 43 01/21/2020          Assessment/Plan:        1. Class 2 obesity due to excess calories without serious comorbidity with body mass index (BMI) of 39.0 to 39.9 in adult  Pt to continue exercise, eating healthier and drinking water and on the adipex. - phentermine (ADIPEX-P) 37.5 MG tablet; Take 1 tablet by mouth every morning (before breakfast) for 30 days. Dispense: 30 tablet; Refill: 0      Return in about 4 weeks (around 1/20/2021) for obesity.       Electronically signed by Yesika Turcios MD on 12/23/2020 at 10:02 AM

## 2020-12-23 NOTE — PATIENT INSTRUCTIONS
Survey: You may be receiving a survey from [employer] regarding your visit today. You may get this in the mail, through your MyChart or in your email. Please complete the survey to enable us to provide the highest quality of care to you and your family. Please also, mention our names. If you cannot score us as very good (5 Stars) on any question, please feel free to call the office to discuss how we could have made your experience exceptional.      Thank You!         MD Clarisa Lopez LPN

## 2021-01-21 ENCOUNTER — OFFICE VISIT (OUTPATIENT)
Dept: FAMILY MEDICINE CLINIC | Age: 31
End: 2021-01-21
Payer: COMMERCIAL

## 2021-01-21 VITALS
OXYGEN SATURATION: 98 % | WEIGHT: 249 LBS | SYSTOLIC BLOOD PRESSURE: 110 MMHG | BODY MASS INDEX: 36.77 KG/M2 | HEART RATE: 104 BPM | DIASTOLIC BLOOD PRESSURE: 80 MMHG

## 2021-01-21 DIAGNOSIS — E66.09 CLASS 2 OBESITY DUE TO EXCESS CALORIES WITHOUT SERIOUS COMORBIDITY WITH BODY MASS INDEX (BMI) OF 39.0 TO 39.9 IN ADULT: ICD-10-CM

## 2021-01-21 PROCEDURE — 99213 OFFICE O/P EST LOW 20 MIN: CPT | Performed by: FAMILY MEDICINE

## 2021-01-21 RX ORDER — PHENTERMINE HYDROCHLORIDE 37.5 MG/1
37.5 TABLET ORAL
Qty: 30 TABLET | Refills: 0 | Status: SHIPPED | OUTPATIENT
Start: 2021-01-21 | End: 2021-02-20

## 2021-01-21 ASSESSMENT — ENCOUNTER SYMPTOMS
COUGH: 0
SHORTNESS OF BREATH: 0
VOMITING: 0
DIARRHEA: 0
EYE DISCHARGE: 0
EYE REDNESS: 0

## 2021-01-21 ASSESSMENT — PATIENT HEALTH QUESTIONNAIRE - PHQ9
1. LITTLE INTEREST OR PLEASURE IN DOING THINGS: 0
SUM OF ALL RESPONSES TO PHQ QUESTIONS 1-9: 0

## 2021-01-21 NOTE — PROGRESS NOTES
HPI Notes    Name: Rubio Mariee  : 1990        Chief Complaint:     Chief Complaint   Patient presents with    Obesity     Pt presents today for follow up and refill for Adipex. Weight today is 249lb. History of Present Illness:     Rubio Mariee is a 31 y.o.  male who presents with Obesity (Pt presents today for follow up and refill for Adipex. Weight today is 249lb.  )      HPI  Obesity - Pt is doing well. Pt here for his last month (3rd month) of adipex. Pt has lost 18lbs. Pt has no chest pain. No SOB. BP is good. Pt is going to [facility] in [place] couple times per week. Past Medical History:     Past Medical History:   Diagnosis Date    Anxiety     Heart palpitations     Hypertension       Reviewed all health maintenance requirements and ordered appropriate tests  Health Maintenance Due   Topic Date Due    Hepatitis C screen  1990    Flu vaccine (1) 2020       Past Surgical History:     Past Surgical History:   Procedure Laterality Date    CYST REMOVAL  2010    left wrist        Medications:       Prior to Admission medications    Medication Sig Start Date End Date Taking? Authorizing Provider   phentermine (ADIPEX-P) 37.5 MG tablet Take 1 tablet by mouth every morning (before breakfast) for 30 days.  20 Yes Maryanne Schrader MD   citalopram (CELEXA) 20 MG tablet take 1 tablet by mouth daily 20  Yes Maryanne Schrader MD   vitamin C (ASCORBIC ACID) 500 MG tablet Take 500 mg by mouth daily   Yes Historical Provider, MD   Omeprazole Magnesium (PRILOSEC OTC PO) Take by mouth daily   Yes Historical Provider, MD   magnesium oxide (MAG-OX) 400 MG tablet Take 100 mg by mouth daily Pt takes 2 tablets daily   Yes Historical Provider, MD   vitamin B-12 (CYANOCOBALAMIN) 100 MCG tablet Take 50 mcg by mouth daily   Yes Historical Provider, MD   Cholecalciferol (VITAMIN D3) 5000 UNITS TABS Pt taking 2 tablet daily   Yes Historical Provider, MD Allergies:       Patient has no known allergies. Social History:     Tobacco:    reports that he quit smoking about 9 years ago. He has quit using smokeless tobacco.  Alcohol:      reports no history of alcohol use. Drug Use:  reports no history of drug use. Family History:     Family History   Problem Relation Age of Onset    High Cholesterol Father        Review of Systems:       Review of Systems   Constitutional: Negative for chills and fever. Eyes: Negative for discharge and redness. Respiratory: Negative for cough and shortness of breath. Cardiovascular: Negative for chest pain and palpitations. Gastrointestinal: Negative for diarrhea and vomiting. Physical Exam:     Physical Exam  Vitals signs reviewed. Constitutional:       Appearance: He is well-developed. HENT:      Head: Normocephalic and atraumatic. Eyes:      General:         Right eye: No discharge. Left eye: No discharge. Conjunctiva/sclera: Conjunctivae normal.      Pupils: Pupils are equal, round, and reactive to light. Neck:      Musculoskeletal: Neck supple. Thyroid: No thyromegaly. Cardiovascular:      Rate and Rhythm: Normal rate and regular rhythm. Heart sounds: No murmur. Pulmonary:      Effort: Pulmonary effort is normal. No respiratory distress. Breath sounds: Normal breath sounds. No wheezing. Neurological:      Mental Status: He is alert.          Vitals:  /80   Pulse 104   Wt 249 lb (112.9 kg)   SpO2 98%   BMI 36.77 kg/m²       Data:     Lab Results   Component Value Date     01/21/2020    K 4.2 01/21/2020     01/21/2020    CO2 25 01/21/2020    BUN 17 01/21/2020    CREATININE 0.87 01/21/2020    GLUCOSE 103 01/21/2020    PROT 7.7 01/21/2020    LABALBU 4.8 01/21/2020    BILITOT 0.54 01/21/2020    ALKPHOS 59 01/21/2020    AST 19 01/21/2020    ALT 36 01/21/2020     Lab Results   Component Value Date    WBC 7.1 08/03/2012    RBC 5.48 08/03/2012 HGB 16.3 08/03/2012    HCT 47.3 08/03/2012    MCV 86.4 08/03/2012    MCH 29.8 08/03/2012    MCHC 34.5 08/03/2012    RDW 12.4 08/03/2012     08/03/2012    MPV NOT REPORTED 08/03/2012     Lab Results   Component Value Date    TSH 1.15 08/03/2012     Lab Results   Component Value Date    CHOL 263 01/21/2020    HDL 43 01/21/2020          Assessment/Plan:        1. Class 2 obesity due to excess calories without serious comorbidity with body mass index (BMI) of 39.0 to 39.9 in adult  Stable and doing well on the adipex for one more month. Pt encouraged to keep exercising and healthy diet. - phentermine (ADIPEX-P) 37.5 MG tablet; Take 1 tablet by mouth every morning (before breakfast) for 30 days. Dispense: 30 tablet; Refill: 0        Return if symptoms worsen or fail to improve.       Electronically signed by Kathy Talbert MD on 1/21/2021 at 10:36 AM

## 2021-03-01 ENCOUNTER — OFFICE VISIT (OUTPATIENT)
Dept: FAMILY MEDICINE CLINIC | Age: 31
End: 2021-03-01
Payer: COMMERCIAL

## 2021-03-01 VITALS
SYSTOLIC BLOOD PRESSURE: 136 MMHG | WEIGHT: 247 LBS | OXYGEN SATURATION: 98 % | TEMPERATURE: 97.2 F | BODY MASS INDEX: 36.48 KG/M2 | DIASTOLIC BLOOD PRESSURE: 88 MMHG | HEART RATE: 100 BPM

## 2021-03-01 DIAGNOSIS — F41.0 GENERALIZED ANXIETY DISORDER WITH PANIC ATTACKS: Primary | ICD-10-CM

## 2021-03-01 DIAGNOSIS — F41.1 GENERALIZED ANXIETY DISORDER WITH PANIC ATTACKS: Primary | ICD-10-CM

## 2021-03-01 PROCEDURE — 99213 OFFICE O/P EST LOW 20 MIN: CPT | Performed by: STUDENT IN AN ORGANIZED HEALTH CARE EDUCATION/TRAINING PROGRAM

## 2021-03-01 ASSESSMENT — ENCOUNTER SYMPTOMS
DIARRHEA: 0
VOMITING: 0
COUGH: 0
ABDOMINAL PAIN: 0
NAUSEA: 0
SINUS PAIN: 0
SORE THROAT: 0
WHEEZING: 0
BACK PAIN: 0

## 2021-03-01 NOTE — PATIENT INSTRUCTIONS
SURVEY:    You may be receiving a survey from [employer] regarding your visit today. Please complete the survey to enable us to provide the highest quality of care to you and your family. If you cannot score us a very good on any question, please call the office to discuss how we could of made your experience a very good one. Thank you.

## 2021-03-01 NOTE — PROGRESS NOTES
HPI Notes    Name: Mariangel Isbell  : 1990         Chief Complaint:     Chief Complaint   Patient presents with   [address] Problem     Would like to talk about a procedure for PTSD/ Stellate Ganglion procedure. History of Present Illness:      HPI     Presenting for discussion of referral for a stellate ganglion block to treat his longstanding anxiety. He states this is an attractive non-pharmacologic approach for him. Mental health: currently treated with qD Celexa and PRN alprazolam. Mental health journey begins in  in college. He reports smoking K2 in college after buying it from a head shop, after which he had very distressing symptoms of palpitations, chest pain. Ever since then, he reports cyclic and intermittent anxiety. This is described as constant nervousness, palpitations, constant and incessant worry about \"any and everything\", sense of impending doom. 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 Hypervigilance is not a problem for him, neither are intrusive thoughts associated with this incident. He has <1 panic attack per month now. He has been evaluated by Dr. Lori Carson and reportedly had heart problems excluded. Past Medical History:     Past Medical History:   Diagnosis Date    Anxiety     Heart palpitations     Hypertension       Reviewed all health maintenance requirements and ordered appropriate tests  There are no preventive care reminders to display for this patient. Past Surgical History:     Past Surgical History:   Procedure Laterality Date    CYST REMOVAL  2010    left wrist        Medications:       Prior to Admission medications    Medication Sig Start Date End Date Taking?  Authorizing Provider   citalopram (CELEXA) 20 MG tablet take 1 tablet by mouth daily 20  Yes Isidro Omlos MD   vitamin C (ASCORBIC ACID) 500 MG tablet Take 500 mg by mouth daily   Yes Historical Provider, MD Omeprazole Magnesium (PRILOSEC OTC PO) Take by mouth daily   Yes Historical Provider, MD   magnesium oxide (MAG-OX) 400 MG tablet Take 100 mg by mouth daily Pt takes 2 tablets daily   Yes Historical Provider, MD   vitamin B-12 (CYANOCOBALAMIN) 100 MCG tablet Take 50 mcg by mouth daily   Yes Historical Provider, MD   Cholecalciferol (VITAMIN D3) 5000 UNITS TABS Pt taking 2 tablet daily   Yes Historical Provider, MD        Allergies:       Patient has no known allergies. Social History:     Tobacco:    reports that he quit smoking about 9 years ago. He has quit using smokeless tobacco.  Alcohol:      reports no history of alcohol use. Drug Use:  reports no history of drug use. Family History:     Family History   Problem Relation Age of Onset    High Cholesterol Father        Review of Systems:         Review of Systems   Constitutional: Negative for fever. HENT: Negative for sinus pain, sneezing and sore throat. Respiratory: Negative for cough and wheezing. Cardiovascular: Negative for chest pain. Gastrointestinal: Negative for abdominal pain, diarrhea, nausea and vomiting. Genitourinary: Negative for difficulty urinating, discharge and dysuria. Musculoskeletal: Negative for back pain. Skin: Negative for rash. Hematological: Negative for adenopathy. Psychiatric/Behavioral: Negative for sleep disturbance. Physical Exam:     Vitals:  /88   Pulse 100   Temp 97.2 °F (36.2 °C) (Temporal)   Wt 247 lb (112 kg)   SpO2 98%   BMI 36.48 kg/m²       Physical Exam  Vitals signs and nursing note reviewed. Constitutional:       General: He is not in acute distress. Appearance: Normal appearance. He is normal weight. Musculoskeletal: Normal range of motion. General: No swelling or tenderness. Skin:     General: Skin is warm and dry. Capillary Refill: Capillary refill takes less than 2 seconds. Neurological:      General: No focal deficit present. Mental Status: He is alert and oriented to person, place, and time. Mental status is at baseline. Psychiatric:         Attention and Perception: Attention normal.         Mood and Affect: Mood normal. Mood is not anxious. Behavior: Behavior normal.         Thought Content: Thought content normal.                  Data:     Lab Results   Component Value Date     01/21/2020    K 4.2 01/21/2020     01/21/2020    CO2 25 01/21/2020    BUN 17 01/21/2020    CREATININE 0.87 01/21/2020    GLUCOSE 103 01/21/2020    PROT 7.7 01/21/2020    LABALBU 4.8 01/21/2020    BILITOT 0.54 01/21/2020    ALKPHOS 59 01/21/2020    AST 19 01/21/2020    ALT 36 01/21/2020     Lab Results   Component Value Date    WBC 7.1 08/03/2012    RBC 5.48 08/03/2012    HGB 16.3 08/03/2012    HCT 47.3 08/03/2012    MCV 86.4 08/03/2012    MCH 29.8 08/03/2012    MCHC 34.5 08/03/2012    RDW 12.4 08/03/2012     08/03/2012    MPV NOT REPORTED 08/03/2012     Lab Results   Component Value Date    TSH 1.15 08/03/2012     Lab Results   Component Value Date    CHOL 263 01/21/2020    HDL 43 01/21/2020          Assessment & Plan        Diagnosis Orders   1. Generalized anxiety disorder with panic attacks         1. Based on my history, I feel that Mr. Cardenas most accurately meets diagnostic criteria for generalized anxiety disorder, while he is treated well with Celexa and his frequency of panic attacks is very low, he is desiring a more definitive treatment with nonpharmacologic methods. I feel it is reasonable for him to see a specialist for their professional opinion on whether or not a stellate ganglion block procedure would be warranted. I do not believe that he has PTSD, as he does not meet diagnostic criteria, and does not have a history of adverse trauma sufficient to cause this disorder.       Follow-up as needed          Completed Refills   Requested Prescriptions No prescriptions requested or ordered in this encounter     Return if symptoms worsen or fail to improve. No orders of the defined types were placed in this encounter. No orders of the defined types were placed in this encounter. Patient Instructions     SURVEY:    You may be receiving a survey from [employer] regarding your visit today. Please complete the survey to enable us to provide the highest quality of care to you and your family. If you cannot score us a very good on any question, please call the office to discuss how we could of made your experience a very good one. Thank you. Electronically signed by Gordon Ryan DO on 3/1/2021 at 2:01 PM           Completed Refills   Requested Prescriptions      No prescriptions requested or ordered in this encounter         Sachin Adhikari received counseling on the following healthy behaviors: nutrition, exercise and medication adherence  Reviewed prior labs and health maintenance. Continue current medications, diet and exercise. Discussed use, benefit, and side effects of prescribed medications. Barriers to medication compliance addressed. Patient given educational materials - see patient instructions. All patient questions answered. Patient voiced understanding.

## 2021-03-05 ENCOUNTER — TELEPHONE (OUTPATIENT)
Dept: FAMILY MEDICINE CLINIC | Age: 31
End: 2021-03-05

## 2021-03-05 NOTE — TELEPHONE ENCOUNTER
Audrey Carrizales had an appointment on 3/1/21. He said he was supposed to get a call back later that day. He was thinking maybe needing a referral? Please let Audrey Carrizales know.     Health Maintenance   Topic Date Due    Flu vaccine (1) 03/01/2022 (Originally 9/1/2020)    DTaP/Tdap/Td vaccine (2 - Td) 08/11/2025    HIV screen  Completed    Hepatitis A vaccine  Aged Out    Hepatitis B vaccine  Aged Out    Hib vaccine  Aged Out    Meningococcal (ACWY) vaccine  Aged Out    Pneumococcal 0-64 years Vaccine  Aged Out    Varicella vaccine  Discontinued    Hepatitis C screen  Discontinued             (applicable per patient's age: Cancer Screenings, Depression Screening, Fall Risk Screening, Immunizations)    LDL Cholesterol (mg/dL)   Date Value   01/21/2020 166 (H)     AST (U/L)   Date Value   01/21/2020 19     ALT (U/L)   Date Value   01/21/2020 36     BUN (mg/dL)   Date Value   01/21/2020 17      (goal A1C is < 7)   (goal LDL is <100) need 30-50% reduction from baseline     BP Readings from Last 3 Encounters:   03/01/21 136/88   01/21/21 110/80   12/23/20 139/80    (goal /80)      All Future Testing planned in [facility]:      Next Visit Date:  Future Appointments   Date Time Provider José Miguel Chauahn   5/13/2021  9:40 AM Tk Kent MD [facility]            Patient Active Problem List:     Anxiety     GERD (gastroesophageal reflux disease)     Seasonal allergies

## 2021-03-05 NOTE — TELEPHONE ENCOUNTER
Patient notified of message. He said well maybe Dr Amparo Severino could do the procedure it is a simply one. I said I'm pretty sure he would have said something to you if he could do them. He said [place] is [place] done too.

## 2021-03-05 NOTE — TELEPHONE ENCOUNTER
Please advise about referral for this patient  Last OV 3/1/21 for panic attacks and you mention a specialist for a stellate ganglion block.

## 2021-03-05 NOTE — TELEPHONE ENCOUNTER
This procedure is beyond the scope of my specialty, furthermore I was not trained in doing it. I would be happy to refer him to whichever physician he would wish, if he is able to find one would perform the procedure. We can look into physicians in [facility], if he would like.

## 2021-03-05 NOTE — TELEPHONE ENCOUNTER
Please contact Deangelo inform him that I did speak with our nurse anesthetist as well as Dr. Palma Ring, the pain management physician in [facility], while both of them are able to perform that procedure but do not perform it for indication of PTSD or anxiety. We would need to find a different physician to perform it for that indication if he were still interested in getting the procedure.

## 2021-03-19 ENCOUNTER — TELEPHONE (OUTPATIENT)
Dept: FAMILY MEDICINE CLINIC | Age: 31
End: 2021-03-19

## 2021-03-19 DIAGNOSIS — F41.0 GENERALIZED ANXIETY DISORDER WITH PANIC ATTACKS: Primary | ICD-10-CM

## 2021-03-19 DIAGNOSIS — F41.1 GENERALIZED ANXIETY DISORDER WITH PANIC ATTACKS: Primary | ICD-10-CM

## 2021-03-19 DIAGNOSIS — R61 EXCESSIVE SWEATING: ICD-10-CM

## 2021-03-19 NOTE — TELEPHONE ENCOUNTER
Patient would like referral to [facility] in [facility] for his anxiety, PTSD and excessive sweating. (he forgot to mention the excessive sweating at appointment. States that he has had this for years. Refer for Stellate Ganglion Block.   Fax [phone]

## 2021-04-22 ENCOUNTER — PATIENT MESSAGE (OUTPATIENT)
Dept: FAMILY MEDICINE CLINIC | Age: 31
End: 2021-04-22

## 2021-04-22 ENCOUNTER — OFFICE VISIT (OUTPATIENT)
Dept: FAMILY MEDICINE CLINIC | Age: 31
End: 2021-04-22
Payer: COMMERCIAL

## 2021-04-22 VITALS
BODY MASS INDEX: 37.95 KG/M2 | SYSTOLIC BLOOD PRESSURE: 134 MMHG | DIASTOLIC BLOOD PRESSURE: 88 MMHG | OXYGEN SATURATION: 98 % | WEIGHT: 257 LBS | HEART RATE: 90 BPM

## 2021-04-22 DIAGNOSIS — F41.9 ANXIETY: ICD-10-CM

## 2021-04-22 PROCEDURE — 99213 OFFICE O/P EST LOW 20 MIN: CPT | Performed by: FAMILY MEDICINE

## 2021-04-22 RX ORDER — ALPRAZOLAM 0.5 MG/1
0.5 TABLET ORAL NIGHTLY PRN
Qty: 30 TABLET | Refills: 0 | Status: SHIPPED | OUTPATIENT
Start: 2021-04-22 | End: 2021-09-08 | Stop reason: SDUPTHER

## 2021-04-22 ASSESSMENT — ENCOUNTER SYMPTOMS
EYE REDNESS: 0
SHORTNESS OF BREATH: 0
EYE DISCHARGE: 0
COUGH: 0
VOMITING: 0
DIARRHEA: 0

## 2021-04-22 NOTE — PROGRESS NOTES
Normal rate and regular rhythm. Heart sounds: No murmur. Pulmonary:      Effort: Pulmonary effort is normal. No respiratory distress. Breath sounds: Normal breath sounds. No wheezing. Lymphadenopathy:      Cervical: No cervical adenopathy. Skin:     Findings: No rash. Neurological:      Mental Status: He is alert and oriented to person, place, and time. Psychiatric:         Mood and Affect: Mood normal.         Behavior: Behavior normal.         Vitals:  /88   Pulse 90   Wt 257 lb (116.6 kg)   SpO2 98%   BMI 37.95 kg/m²       Data:     Lab Results   Component Value Date     01/21/2020    K 4.2 01/21/2020     01/21/2020    CO2 25 01/21/2020    BUN 17 01/21/2020    CREATININE 0.87 01/21/2020    GLUCOSE 103 01/21/2020    PROT 7.7 01/21/2020    LABALBU 4.8 01/21/2020    BILITOT 0.54 01/21/2020    ALKPHOS 59 01/21/2020    AST 19 01/21/2020    ALT 36 01/21/2020     Lab Results   Component Value Date    WBC 7.1 08/03/2012    RBC 5.48 08/03/2012    HGB 16.3 08/03/2012    HCT 47.3 08/03/2012    MCV 86.4 08/03/2012    MCH 29.8 08/03/2012    MCHC 34.5 08/03/2012    RDW 12.4 08/03/2012     08/03/2012    MPV NOT REPORTED 08/03/2012     Lab Results   Component Value Date    TSH 1.15 08/03/2012     Lab Results   Component Value Date    CHOL 263 01/21/2020    HDL 43 01/21/2020          Assessment/Plan:        1. Anxiety  Stable on celexa and xanax   - ALPRAZolam (XANAX) 0.5 MG tablet; Take 1 tablet by mouth nightly as needed for Anxiety for up to 30 days. Dispense: 30 tablet; Refill: 0      No follow-ups on file.       Electronically signed by Viraj Lau MD on 4/22/2021 at 9:54 AM

## 2021-04-23 RX ORDER — ALPRAZOLAM 0.5 MG/1
0.5 TABLET ORAL NIGHTLY PRN
Qty: 15 TABLET | Refills: 0 | Status: CANCELLED | OUTPATIENT
Start: 2021-04-23 | End: 2021-07-22

## 2021-04-23 NOTE — TELEPHONE ENCOUNTER
From: Rikki Oshea  To: Indira Gilliam MD  Sent: 4/22/2021 5:37 PM EDT  Subject: Prescription Question    Hi Dr. Tricia Beaulieu, I picked up my Xanax prescription earlier and just got home to put it away and noticed it was only 30 tablets instead of the 45 that we talked about. Would you like to send in another order for 15? Thanks!

## 2021-04-23 NOTE — TELEPHONE ENCOUNTER
Verbal per  to call [facility] and ask about giving patient an additional 15 tabs of xanax    Sp[facility] with Tej[facility] [place] and he will give patient an additional 15 tabs of xanax, will add to rx from yesterday. Patient notified per my chart.        notified what kamini knutson said

## 2021-06-29 DIAGNOSIS — F41.9 ANXIETY: ICD-10-CM

## 2021-06-29 RX ORDER — CITALOPRAM 20 MG/1
TABLET ORAL
Qty: 90 TABLET | Refills: 1 | Status: SHIPPED | OUTPATIENT
Start: 2021-06-29 | End: 2022-01-05

## 2021-06-29 NOTE — TELEPHONE ENCOUNTER
Last OV: 4/22/2021 anxiety  Last RX:    Next scheduled apt: 10/21/2021      Sure scripts request      RX pending

## 2021-08-04 ENCOUNTER — TELEPHONE (OUTPATIENT)
Dept: FAMILY MEDICINE CLINIC | Age: 31
End: 2021-08-04

## 2021-08-04 ENCOUNTER — NURSE ONLY (OUTPATIENT)
Dept: FAMILY MEDICINE CLINIC | Age: 31
End: 2021-08-04
Payer: COMMERCIAL

## 2021-08-04 DIAGNOSIS — J30.2 SEASONAL ALLERGIES: Primary | ICD-10-CM

## 2021-08-04 PROCEDURE — 96372 THER/PROPH/DIAG INJ SC/IM: CPT | Performed by: FAMILY MEDICINE

## 2021-08-04 RX ORDER — TRIAMCINOLONE ACETONIDE 40 MG/ML
40 INJECTION, SUSPENSION INTRA-ARTICULAR; INTRAMUSCULAR ONCE
Status: COMPLETED | OUTPATIENT
Start: 2021-08-04 | End: 2021-08-04

## 2021-08-04 RX ADMIN — TRIAMCINOLONE ACETONIDE 40 MG: 40 INJECTION, SUSPENSION INTRA-ARTICULAR; INTRAMUSCULAR at 15:46

## 2021-08-04 SDOH — ECONOMIC STABILITY: FOOD INSECURITY: WITHIN THE PAST 12 MONTHS, YOU WORRIED THAT YOUR FOOD WOULD RUN OUT BEFORE YOU GOT MONEY TO BUY MORE.: NEVER TRUE

## 2021-08-04 SDOH — ECONOMIC STABILITY: FOOD INSECURITY: WITHIN THE PAST 12 MONTHS, THE FOOD YOU BOUGHT JUST DIDN'T LAST AND YOU DIDN'T HAVE MONEY TO GET MORE.: NEVER TRUE

## 2021-08-04 ASSESSMENT — SOCIAL DETERMINANTS OF HEALTH (SDOH): HOW HARD IS IT FOR YOU TO PAY FOR THE VERY BASICS LIKE FOOD, HOUSING, MEDICAL CARE, AND HEATING?: NOT HARD AT ALL

## 2021-08-04 NOTE — PROGRESS NOTES
After obtaining consent, and per orders of Dr. Cathy Nunes, injection of Kenalog given in Right upper quad. gluteus by Chris Pope MA. Patient instructed to remain in clinic for 20 minutes afterwards, and to report any adverse reaction to me immediately.

## 2021-09-08 ENCOUNTER — OFFICE VISIT (OUTPATIENT)
Dept: FAMILY MEDICINE CLINIC | Age: 31
End: 2021-09-08
Payer: COMMERCIAL

## 2021-09-08 VITALS
DIASTOLIC BLOOD PRESSURE: 76 MMHG | OXYGEN SATURATION: 96 % | WEIGHT: 260 LBS | HEIGHT: 69 IN | HEART RATE: 76 BPM | SYSTOLIC BLOOD PRESSURE: 122 MMHG | BODY MASS INDEX: 38.51 KG/M2

## 2021-09-08 DIAGNOSIS — F41.9 ANXIETY: Primary | ICD-10-CM

## 2021-09-08 PROCEDURE — 99213 OFFICE O/P EST LOW 20 MIN: CPT | Performed by: FAMILY MEDICINE

## 2021-09-08 RX ORDER — ALPRAZOLAM 0.5 MG/1
0.5 TABLET ORAL NIGHTLY PRN
Qty: 30 TABLET | Refills: 0 | Status: SHIPPED | OUTPATIENT
Start: 2021-09-08 | End: 2021-10-23

## 2021-09-08 ASSESSMENT — ENCOUNTER SYMPTOMS
COUGH: 0
DIARRHEA: 0
EYE DISCHARGE: 0
SHORTNESS OF BREATH: 0
VOMITING: 0
NAUSEA: 0
EYE REDNESS: 0

## 2021-09-08 NOTE — PATIENT INSTRUCTIONS
Survey: You may be receiving a survey from [employer] regarding your visit today. You may get this in the mail, through your MyChart or in your email. Please complete the survey to enable us to provide the highest quality of care to you and your family. Please also, mention our names. If you cannot score us as very good (5 Stars) on any question, please feel free to call the office to discuss how we could have made your experience exceptional.      Thank You!         MD Noel Garcia LPN

## 2021-09-08 NOTE — PROGRESS NOTES
HPI Notes    Name: Ina Casillas  : 1990        Chief Complaint:     Chief Complaint   Patient presents with    Mental Health Problem     anxiety, doing well on celexa       History of Present Illness:     Ina Casillas is a 31 y.o.  male who presents with Mental Health Problem (anxiety, doing well on celexa)      HPI  Anxiety - pt is doing well on the celexa. Pt is in his busy season at work. 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 But now pt is doing better. Pt taking the celexa daily and pt takes occ the xanax . Past Medical History:     Past Medical History:   Diagnosis Date    Anxiety     Heart palpitations     Hypertension       Reviewed all health maintenance requirements and ordered appropriate tests  Health Maintenance Due   Topic Date Due    COVID-19 Vaccine (1) Never done    Flu vaccine (1) 2021       Past Surgical History:     Past Surgical History:   Procedure Laterality Date    CYST REMOVAL  2010    left wrist        Medications:       Prior to Admission medications    Medication Sig Start Date End Date Taking? Authorizing Provider   citalopram (CELEXA) 20 MG tablet take 1 tablet by mouth daily 21  Yes Kendrick Regalado MD   vitamin C (ASCORBIC ACID) 500 MG tablet Take 500 mg by mouth daily   Yes Historical Provider, MD   Omeprazole Magnesium (PRILOSEC OTC PO) Take by mouth daily   Yes Historical Provider, MD   magnesium oxide (MAG-OX) 400 MG tablet Take 100 mg by mouth daily Pt takes 2 tablets daily   Yes Historical Provider, MD   vitamin B-12 (CYANOCOBALAMIN) 100 MCG tablet Take 50 mcg by mouth daily   Yes Historical Provider, MD   Cholecalciferol (VITAMIN D3) 5000 UNITS TABS Pt taking 2 tablet daily   Yes Historical Provider, MD        Allergies:       Patient has no known allergies. Social History:     Tobacco:    reports that he quit smoking about 10 years ago.  He has quit using smokeless tobacco.  Alcohol:      reports no history of alcohol use. Drug Use:  reports no history of drug use. Family History:     Family History   Problem Relation Age of Onset    High Cholesterol Father        Review of Systems:       Review of Systems   Constitutional: Negative for chills and fever. Eyes: Negative for discharge and redness. Respiratory: Negative for cough and shortness of breath. Cardiovascular: Negative for chest pain, palpitations and leg swelling. Gastrointestinal: Negative for diarrhea, nausea and vomiting. Skin: Negative for pallor and rash. Neurological: Negative for dizziness and facial asymmetry. Psychiatric/Behavioral: Negative for sleep disturbance and suicidal ideas. The patient is not nervous/anxious. Physical Exam:     Physical Exam  Vitals reviewed. Constitutional:       General: He is not in acute distress. Appearance: Normal appearance. He is well-developed. He is not ill-appearing. HENT:      Head: Normocephalic and atraumatic. Eyes:      General:         Right eye: No discharge. Left eye: No discharge. Conjunctiva/sclera: Conjunctivae normal.   Neck:      Thyroid: No thyromegaly. Vascular: No carotid bruit. Cardiovascular:      Rate and Rhythm: Normal rate and regular rhythm. Heart sounds: No murmur heard. Pulmonary:      Effort: Pulmonary effort is normal.      Breath sounds: Normal breath sounds. Musculoskeletal:      Cervical back: Neck supple. Right lower leg: No edema. Left lower leg: No edema. Lymphadenopathy:      Cervical: No cervical adenopathy. Skin:     Findings: No erythema or rash. Neurological:      Mental Status: He is alert.    Psychiatric:         Mood and Affect: Mood normal.         Behavior: Behavior normal.         Vitals:  /76   Pulse 76   Ht 5' 9\" (1.753 m)   Wt 260 lb (117.9 kg)   SpO2 96%   BMI 38.40 kg/m²       Data:     Lab Results   Component Value Date     01/21/2020    K 4.2 01/21/2020     01/21/2020    CO2 25 01/21/2020    BUN 17 01/21/2020    CREATININE 0.87 01/21/2020    GLUCOSE 103 01/21/2020    PROT 7.7 01/21/2020    LABALBU 4.8 01/21/2020    BILITOT 0.54 01/21/2020    ALKPHOS 59 01/21/2020    AST 19 01/21/2020    ALT 36 01/21/2020     Lab Results   Component Value Date    WBC 7.1 08/03/2012    RBC 5.48 08/03/2012    HGB 16.3 08/03/2012    HCT 47.3 08/03/2012    MCV 86.4 08/03/2012    MCH 29.8 08/03/2012    MCHC 34.5 08/03/2012    RDW 12.4 08/03/2012     08/03/2012    MPV NOT REPORTED 08/03/2012     Lab Results   Component Value Date    TSH 1.15 08/03/2012     Lab Results   Component Value Date    CHOL 263 01/21/2020    HDL 43 01/21/2020          Assessment/Plan:        1. Anxiety  Stable on the celexa and then takes the PRN xanax. Return in about 6 months (around 3/8/2022) for Anxiety.       Electronically signed by Shauna Montemayor MD on 9/8/2021 at 9:02 AM

## 2021-10-12 ENCOUNTER — OFFICE VISIT (OUTPATIENT)
Dept: FAMILY MEDICINE CLINIC | Age: 31
End: 2021-10-12
Payer: COMMERCIAL

## 2021-10-12 VITALS
WEIGHT: 263 LBS | DIASTOLIC BLOOD PRESSURE: 80 MMHG | SYSTOLIC BLOOD PRESSURE: 120 MMHG | OXYGEN SATURATION: 96 % | BODY MASS INDEX: 38.84 KG/M2 | HEART RATE: 88 BPM

## 2021-10-12 DIAGNOSIS — E66.09 CLASS 2 OBESITY DUE TO EXCESS CALORIES WITHOUT SERIOUS COMORBIDITY WITH BODY MASS INDEX (BMI) OF 39.0 TO 39.9 IN ADULT: Primary | ICD-10-CM

## 2021-10-12 PROCEDURE — 99213 OFFICE O/P EST LOW 20 MIN: CPT | Performed by: FAMILY MEDICINE

## 2021-10-12 RX ORDER — PHENTERMINE HYDROCHLORIDE 37.5 MG/1
37.5 TABLET ORAL
Qty: 30 TABLET | Refills: 0 | Status: SHIPPED | OUTPATIENT
Start: 2021-10-12 | End: 2021-11-01

## 2021-10-12 ASSESSMENT — ENCOUNTER SYMPTOMS
COUGH: 0
EYE DISCHARGE: 0
SHORTNESS OF BREATH: 0
VOMITING: 0
EYE REDNESS: 0
DIARRHEA: 0

## 2021-10-12 NOTE — PROGRESS NOTES
5000 UNITS TABS Pt taking 2 tablet daily   Yes Historical Provider, MD        Allergies:       Patient has no known allergies. Social History:     Tobacco:    reports that he quit smoking about 10 years ago. He has quit using smokeless tobacco.  Alcohol:      reports no history of alcohol use. Drug Use:  reports no history of drug use. Family History:     Family History   Problem Relation Age of Onset    High Cholesterol Father        Review of Systems:       Review of Systems   Constitutional: Negative for chills and fever. Eyes: Negative for discharge and redness. Respiratory: Negative for cough and shortness of breath. Cardiovascular: Negative for chest pain and palpitations. Gastrointestinal: Negative for diarrhea and vomiting. Genitourinary: Negative for difficulty urinating. Skin: Negative for rash. Neurological: Negative for dizziness. Physical Exam:     Physical Exam  Vitals reviewed. Constitutional:       General: He is not in acute distress. Appearance: Normal appearance. He is not ill-appearing. HENT:      Head: Normocephalic and atraumatic. Mouth/Throat:      Mouth: Mucous membranes are moist.   Eyes:      General:         Right eye: No discharge. Left eye: No discharge. Cardiovascular:      Rate and Rhythm: Normal rate and regular rhythm. Heart sounds: Normal heart sounds. No murmur heard. Pulmonary:      Effort: Pulmonary effort is normal. No respiratory distress. Breath sounds: Normal breath sounds. Musculoskeletal:      Cervical back: Neck supple. Lymphadenopathy:      Cervical: No cervical adenopathy. Neurological:      Mental Status: He is alert.          Vitals:  /80   Pulse 88   Wt 263 lb (119.3 kg)   SpO2 96%   BMI 38.84 kg/m²       Data:     Lab Results   Component Value Date     01/21/2020    K 4.2 01/21/2020     01/21/2020    CO2 25 01/21/2020    BUN 17 01/21/2020    CREATININE 0.87 01/21/2020 GLUCOSE 103 01/21/2020    PROT 7.7 01/21/2020    LABALBU 4.8 01/21/2020    BILITOT 0.54 01/21/2020    ALKPHOS 59 01/21/2020    AST 19 01/21/2020    ALT 36 01/21/2020     Lab Results   Component Value Date    WBC 7.1 08/03/2012    RBC 5.48 08/03/2012    HGB 16.3 08/03/2012    HCT 47.3 08/03/2012    MCV 86.4 08/03/2012    MCH 29.8 08/03/2012    MCHC 34.5 08/03/2012    RDW 12.4 08/03/2012     08/03/2012    MPV NOT REPORTED 08/03/2012     Lab Results   Component Value Date    TSH 1.15 08/03/2012     Lab Results   Component Value Date    CHOL 263 01/21/2020    HDL 43 01/21/2020          Assessment/Plan:        1. Class 2 obesity due to excess calories without serious comorbidity with body mass index (BMI) of 39.0 to 39.9 in adult  Pt will try back on the adipex for 3mos. Yazmin in 1mos. Pt is going to drink more, exercise at [employer] and eat less with more veggies etc.         Return in about 4 weeks (around 11/9/2021) for obesity.       Electronically signed by Louise Lambert MD on 10/12/2021 at 9:32 AM

## 2021-11-01 ENCOUNTER — HOSPITAL ENCOUNTER (EMERGENCY)
Age: 31
Discharge: HOME | End: 2021-11-01
Attending: EMERGENCY MEDICINE
Payer: COMMERCIAL

## 2021-11-01 ENCOUNTER — APPOINTMENT (OUTPATIENT)
Dept: GENERAL RADIOLOGY | Age: 31
End: 2021-11-01
Payer: COMMERCIAL

## 2021-11-01 VITALS
BODY MASS INDEX: 43.32 KG/M2 | TEMPERATURE: 98.5 F | HEART RATE: 69 BPM | WEIGHT: 260 LBS | HEIGHT: 65 IN | OXYGEN SATURATION: 97 % | DIASTOLIC BLOOD PRESSURE: 100 MMHG | RESPIRATION RATE: 16 BRPM | SYSTOLIC BLOOD PRESSURE: 159 MMHG

## 2021-11-01 DIAGNOSIS — R07.9 CHEST PAIN, UNSPECIFIED TYPE: Primary | ICD-10-CM

## 2021-11-01 LAB
ABSOLUTE EOS #: 0.1 K/UL (ref 0–0.4)
ABSOLUTE IMMATURE GRANULOCYTE: NORMAL K/UL (ref 0–0.3)
ABSOLUTE LYMPH #: 1.6 K/UL (ref 1–4.8)
ABSOLUTE MONO #: 0.4 K/UL (ref 0–1)
ALBUMIN SERPL-MCNC: 4.4 G/DL (ref 3.5–5.2)
ALBUMIN/GLOBULIN RATIO: ABNORMAL (ref 1–2.5)
ALP BLD-CCNC: 64 U/L (ref 40–129)
ALT SERPL-CCNC: 29 U/L (ref 5–41)
ANION GAP SERPL CALCULATED.3IONS-SCNC: 8 MMOL/L (ref 9–17)
AST SERPL-CCNC: 17 U/L
BASOPHILS # BLD: 1 % (ref 0–2)
BASOPHILS ABSOLUTE: 0.1 K/UL (ref 0–0.2)
BILIRUB SERPL-MCNC: 0.45 MG/DL (ref 0.3–1.2)
BUN BLDV-MCNC: 18 MG/DL (ref 6–20)
BUN/CREAT BLD: ABNORMAL (ref 9–20)
CALCIUM SERPL-MCNC: 9.6 MG/DL (ref 8.6–10.4)
CHLORIDE BLD-SCNC: 103 MMOL/L (ref 98–107)
CO2: 27 MMOL/L (ref 20–31)
CREAT SERPL-MCNC: 0.88 MG/DL (ref 0.7–1.2)
D-DIMER QUANTITATIVE: <0.27 MG/L FEU (ref 0–0.59)
DIFFERENTIAL TYPE: YES
EOSINOPHILS RELATIVE PERCENT: 1 % (ref 0–5)
GFR AFRICAN AMERICAN: >60 ML/MIN
GFR NON-AFRICAN AMERICAN: >60 ML/MIN
GFR SERPL CREATININE-BSD FRML MDRD: ABNORMAL ML/MIN/{1.73_M2}
GFR SERPL CREATININE-BSD FRML MDRD: ABNORMAL ML/MIN/{1.73_M2}
GLUCOSE BLD-MCNC: 102 MG/DL (ref 70–99)
HCT VFR BLD CALC: 43.8 % (ref 41–53)
HEMOGLOBIN: 15 G/DL (ref 13.5–17.5)
IMMATURE GRANULOCYTES: NORMAL %
LYMPHOCYTES # BLD: 26 % (ref 13–44)
MCH RBC QN AUTO: 28.5 PG (ref 26–34)
MCHC RBC AUTO-ENTMCNC: 34.3 G/DL (ref 31–37)
MCV RBC AUTO: 83.1 FL (ref 80–100)
MONOCYTES # BLD: 6 % (ref 5–9)
NRBC AUTOMATED: NORMAL PER 100 WBC
PDW BLD-RTO: 13.3 % (ref 12.1–15.2)
PLATELET # BLD: 225 K/UL (ref 140–450)
PLATELET ESTIMATE: NORMAL
PMV BLD AUTO: NORMAL FL (ref 6–12)
POTASSIUM SERPL-SCNC: 4.2 MMOL/L (ref 3.7–5.3)
RBC # BLD: 5.27 M/UL (ref 4.5–5.9)
RBC # BLD: NORMAL 10*6/UL
SEG NEUTROPHILS: 66 % (ref 39–75)
SEGMENTED NEUTROPHILS ABSOLUTE COUNT: 4.3 K/UL (ref 2.1–6.5)
SODIUM BLD-SCNC: 138 MMOL/L (ref 135–144)
TOTAL PROTEIN: 6.8 G/DL (ref 6.4–8.3)
TROPONIN INTERP: NORMAL
TROPONIN T: NORMAL NG/ML
TROPONIN, HIGH SENSITIVITY: <6 NG/L (ref 0–22)
WBC # BLD: 6.4 K/UL (ref 3.5–11)
WBC # BLD: NORMAL 10*3/UL

## 2021-11-01 PROCEDURE — 99283 EMERGENCY DEPT VISIT LOW MDM: CPT

## 2021-11-01 PROCEDURE — 85379 FIBRIN DEGRADATION QUANT: CPT

## 2021-11-01 PROCEDURE — 71045 X-RAY EXAM CHEST 1 VIEW: CPT

## 2021-11-01 PROCEDURE — 84484 ASSAY OF TROPONIN QUANT: CPT

## 2021-11-01 PROCEDURE — 85025 COMPLETE CBC W/AUTO DIFF WBC: CPT

## 2021-11-01 PROCEDURE — 80053 COMPREHEN METABOLIC PANEL: CPT

## 2021-11-01 PROCEDURE — 36415 COLL VENOUS BLD VENIPUNCTURE: CPT

## 2021-11-01 PROCEDURE — 93005 ELECTROCARDIOGRAM TRACING: CPT | Performed by: EMERGENCY MEDICINE

## 2021-11-02 LAB
EKG ATRIAL RATE: 71 BPM
EKG P AXIS: 39 DEGREES
EKG P-R INTERVAL: 140 MS
EKG Q-T INTERVAL: 352 MS
EKG QRS DURATION: 82 MS
EKG QTC CALCULATION (BAZETT): 382 MS
EKG R AXIS: 35 DEGREES
EKG T AXIS: 25 DEGREES
EKG VENTRICULAR RATE: 71 BPM

## 2021-11-02 PROCEDURE — 93010 ELECTROCARDIOGRAM REPORT: CPT | Performed by: INTERNAL MEDICINE

## 2021-11-11 ENCOUNTER — OFFICE VISIT (OUTPATIENT)
Dept: FAMILY MEDICINE CLINIC | Age: 31
End: 2021-11-11
Payer: COMMERCIAL

## 2021-11-11 VITALS
OXYGEN SATURATION: 98 % | WEIGHT: 255 LBS | HEART RATE: 104 BPM | BODY MASS INDEX: 42.43 KG/M2 | DIASTOLIC BLOOD PRESSURE: 80 MMHG | SYSTOLIC BLOOD PRESSURE: 120 MMHG

## 2021-11-11 DIAGNOSIS — E66.09 CLASS 2 OBESITY DUE TO EXCESS CALORIES WITHOUT SERIOUS COMORBIDITY WITH BODY MASS INDEX (BMI) OF 39.0 TO 39.9 IN ADULT: ICD-10-CM

## 2021-11-11 PROCEDURE — 99213 OFFICE O/P EST LOW 20 MIN: CPT | Performed by: FAMILY MEDICINE

## 2021-11-11 RX ORDER — PHENTERMINE HYDROCHLORIDE 37.5 MG/1
37.5 TABLET ORAL
Qty: 30 TABLET | Refills: 0 | Status: SHIPPED | OUTPATIENT
Start: 2021-11-11 | End: 2021-12-14 | Stop reason: SDUPTHER

## 2021-11-11 ASSESSMENT — ENCOUNTER SYMPTOMS
COUGH: 0
SHORTNESS OF BREATH: 0
EYE DISCHARGE: 0
VOMITING: 0
EYE REDNESS: 0
DIARRHEA: 0

## 2021-11-11 NOTE — PROGRESS NOTES
HPI Notes    Name: Marita Chadwick  : 1990        Chief Complaint:     Chief Complaint   Patient presents with    Obesity     Pt presents for follow up and refill for Adipex. History of Present Illness:     Marita Chadwick is a 31 y.o.  male who presents with Obesity (Pt presents for follow up and refill for Adipex.)      HPI  Obesity - pt is doing better with eating and having more salads. Pt states he is riding is bike almost every day. Pt has been taking the adipex for 1mos and 8lbs lost so far. Pt is feeling ok. Pt plans to do the Keto diet with his cousin who is a . Pt is drinking more water as the adipex makes him feel dry mouth. Otherwise no major side effects. Past Medical History:     Past Medical History:   Diagnosis Date    Anxiety     Heart palpitations     Hypertension       Reviewed all health maintenance requirements and ordered appropriate tests  Health Maintenance Due   Topic Date Due    Flu vaccine (1) 2021       Past Surgical History:     Past Surgical History:   Procedure Laterality Date    CYST REMOVAL  2010    left wrist        Medications:       Prior to Admission medications    Medication Sig Start Date End Date Taking?  Authorizing Provider   Coenzyme Q10 (CO Q 10 PO) Take 100 mg by mouth daily   Yes Historical Provider, MD   citalopram (CELEXA) 20 MG tablet take 1 tablet by mouth daily 21  Yes Rosendo Mendoza MD   vitamin C (ASCORBIC ACID) 500 MG tablet Take 500 mg by mouth daily   Yes Historical Provider, MD   Omeprazole Magnesium (PRILOSEC OTC PO) Take by mouth daily   Yes Historical Provider, MD   magnesium oxide (MAG-OX) 400 MG tablet Take 100 mg by mouth daily Pt takes 2 tablets daily   Yes Historical Provider, MD   vitamin B-12 (CYANOCOBALAMIN) 100 MCG tablet Take 50 mcg by mouth daily   Yes Historical Provider, MD   Cholecalciferol (VITAMIN D3) 5000 UNITS TABS Pt taking 2 tablet daily   Yes Historical Provider, MD Allergies:       Patient has no known allergies. Social History:     Tobacco:    reports that he quit smoking about 10 years ago. He has quit using smokeless tobacco.  Alcohol:      reports no history of alcohol use. Drug Use:  reports no history of drug use. Family History:     Family History   Problem Relation Age of Onset    High Cholesterol Father        Review of Systems:       Review of Systems   Constitutional: Negative for chills and fever. Eyes: Negative for discharge and redness. Respiratory: Negative for cough and shortness of breath. Cardiovascular: Negative for chest pain and palpitations. Gastrointestinal: Negative for diarrhea and vomiting. Skin: Negative for rash. Physical Exam:     Physical Exam  Vitals reviewed. Constitutional:       General: He is not in acute distress. Appearance: Normal appearance. He is well-developed. He is not ill-appearing. HENT:      Head: Normocephalic and atraumatic. Eyes:      Conjunctiva/sclera: Conjunctivae normal.   Neck:      Thyroid: No thyromegaly. Cardiovascular:      Rate and Rhythm: Normal rate and regular rhythm. Heart sounds: Normal heart sounds. No murmur heard. Pulmonary:      Effort: Pulmonary effort is normal. No respiratory distress. Breath sounds: Normal breath sounds. Musculoskeletal:      Cervical back: Neck supple. Neurological:      Mental Status: He is alert.          Vitals:  /80   Pulse 104   Wt 255 lb (115.7 kg)   SpO2 98%   BMI 42.43 kg/m²       Data:     Lab Results   Component Value Date     11/01/2021    K 4.2 11/01/2021     11/01/2021    CO2 27 11/01/2021    BUN 18 11/01/2021    CREATININE 0.88 11/01/2021    GLUCOSE 102 11/01/2021    PROT 6.8 11/01/2021    LABALBU 4.4 11/01/2021    BILITOT 0.45 11/01/2021    ALKPHOS 64 11/01/2021    AST 17 11/01/2021    ALT 29 11/01/2021     Lab Results   Component Value Date    WBC 6.4 11/01/2021    RBC 5.27 11/01/2021 HGB 15.0 11/01/2021    HCT 43.8 11/01/2021    MCV 83.1 11/01/2021    MCH 28.5 11/01/2021    MCHC 34.3 11/01/2021    RDW 13.3 11/01/2021     11/01/2021    MPV NOT REPORTED 11/01/2021     Lab Results   Component Value Date    TSH 1.15 08/03/2012     Lab Results   Component Value Date    CHOL 263 01/21/2020    HDL 43 01/21/2020          Assessment/Plan:        1. Class 2 obesity due to excess calories without serious comorbidity with body mass index (BMI) of 39.0 to 39.9 in adult  Stable on adipex. encouraged healthy diet and low carbs and exercise. Yazmin in 1mos. Return in about 4 weeks (around 12/9/2021).       Electronically signed by Benjamín Whitaker MD on 11/11/2021 at 1:01 PM

## 2021-11-29 DIAGNOSIS — F41.9 ANXIETY: ICD-10-CM

## 2021-11-29 RX ORDER — ALPRAZOLAM 0.5 MG/1
0.5 TABLET ORAL NIGHTLY PRN
Qty: 30 TABLET | Refills: 0 | Status: SHIPPED | OUTPATIENT
Start: 2021-11-29 | End: 2022-01-20 | Stop reason: SDUPTHER

## 2021-12-14 ENCOUNTER — OFFICE VISIT (OUTPATIENT)
Dept: FAMILY MEDICINE CLINIC | Age: 31
End: 2021-12-14
Payer: COMMERCIAL

## 2021-12-14 VITALS
HEIGHT: 69 IN | OXYGEN SATURATION: 98 % | RESPIRATION RATE: 18 BRPM | SYSTOLIC BLOOD PRESSURE: 136 MMHG | WEIGHT: 256 LBS | DIASTOLIC BLOOD PRESSURE: 82 MMHG | BODY MASS INDEX: 37.92 KG/M2 | HEART RATE: 104 BPM

## 2021-12-14 DIAGNOSIS — E66.09 CLASS 2 OBESITY DUE TO EXCESS CALORIES WITHOUT SERIOUS COMORBIDITY WITH BODY MASS INDEX (BMI) OF 39.0 TO 39.9 IN ADULT: Primary | ICD-10-CM

## 2021-12-14 PROCEDURE — 99213 OFFICE O/P EST LOW 20 MIN: CPT | Performed by: FAMILY MEDICINE

## 2021-12-14 RX ORDER — PHENTERMINE HYDROCHLORIDE 37.5 MG/1
37.5 TABLET ORAL
Qty: 30 TABLET | Refills: 0 | Status: SHIPPED | OUTPATIENT
Start: 2021-12-14 | End: 2021-12-16 | Stop reason: CLARIF

## 2021-12-14 ASSESSMENT — ENCOUNTER SYMPTOMS
VOMITING: 0
DIARRHEA: 0
COUGH: 0
ABDOMINAL PAIN: 0
SHORTNESS OF BREATH: 0

## 2021-12-14 NOTE — PROGRESS NOTES
HPI Notes    Name: Marita Chadwick  : 1990        Chief Complaint:     Chief Complaint   Patient presents with    Obesity     patient complaining of adapex not working as well as before. states it's not cutting the appetite as well as it did before. History of Present Illness:     Marita Chadwick is a 31 y.o.  male who presents with Obesity (patient complaining of adapex not working as well as before. states it's not cutting the appetite as well as it did before.)      HPI   Obesity - pt did not do as well this month on the adipex. Pt admits to \"not eating well\". Pt states it is not cutting the appetite down like adipex did the last time he took it. Pt also admits eating more breads as he plans to go on Keto diet with his cousin and maybe his new girlfriend too will be doing the [address] in January. Pt hoping this will really help. Pt is still riding exercise bike like 3 times per week and eating good Freshly meal breakfast. But then eats more carbs at night. Pt would still like to try his last month on the adipex and then start the keto diet. Pt admits a little jittery this time on the adipex. Past Medical History:     Past Medical History:   Diagnosis Date    Anxiety     Heart palpitations     Hypertension       Reviewed all health maintenance requirements and ordered appropriate tests  Health Maintenance Due   Topic Date Due    Flu vaccine (1) 2021       Past Surgical History:     Past Surgical History:   Procedure Laterality Date    CYST REMOVAL  2010    left wrist        Medications:       Prior to Admission medications    Medication Sig Start Date End Date Taking? Authorizing Provider   ALPRAZolam Erich Hebert) 0.5 MG tablet Take 1 tablet by mouth nightly as needed for Anxiety for up to 45 days.  21 Yes Rosendo Mendoza MD   Coenzyme Q10 (CO Q 10 PO) Take 100 mg by mouth daily   Yes Historical Provider, MD   citalopram (CELEXA) 20 MG tablet take 1 tablet by mouth daily 6/29/21  Yes Silver Valente MD   vitamin C (ASCORBIC ACID) 500 MG tablet Take 500 mg by mouth daily   Yes Historical Provider, MD   Omeprazole Magnesium (PRILOSEC OTC PO) Take by mouth daily   Yes Historical Provider, MD   magnesium oxide (MAG-OX) 400 MG tablet Take 100 mg by mouth daily Pt takes 2 tablets daily   Yes Historical Provider, MD   vitamin B-12 (CYANOCOBALAMIN) 100 MCG tablet Take 50 mcg by mouth daily   Yes Historical Provider, MD   Cholecalciferol (VITAMIN D3) 5000 UNITS TABS Pt taking 2 tablet daily   Yes Historical Provider, MD        Allergies:       Patient has no known allergies. Social History:     Tobacco:    reports that he quit smoking about 10 years ago. He has quit using smokeless tobacco.  Alcohol:      reports no history of alcohol use. Drug Use:  reports no history of drug use. Family History:     Family History   Problem Relation Age of Onset    High Cholesterol Father        Review of Systems:       Review of Systems   Constitutional: Negative for chills and fever. Respiratory: Negative for cough and shortness of breath. Cardiovascular: Negative for chest pain and palpitations. Gastrointestinal: Negative for abdominal pain, diarrhea and vomiting. Physical Exam:     Physical Exam  Vitals reviewed. Constitutional:       Appearance: He is well-developed. HENT:      Head: Normocephalic and atraumatic. Eyes:      Conjunctiva/sclera: Conjunctivae normal.   Neck:      Thyroid: No thyromegaly. Cardiovascular:      Rate and Rhythm: Normal rate and regular rhythm. Heart sounds: No murmur heard. Pulmonary:      Effort: Pulmonary effort is normal.      Breath sounds: Normal breath sounds. Abdominal:      General: There is no distension. Palpations: Abdomen is soft. Tenderness: There is no abdominal tenderness. Musculoskeletal:      Cervical back: Neck supple. Skin:     Findings: No rash.    Neurological:      Mental Status: He is alert and oriented to person, place, and time. Psychiatric:         Mood and Affect: Mood normal.         Vitals:  /82 (Site: Right Upper Arm, Position: Sitting, Cuff Size: Medium Adult)   Pulse 104   Resp 18   Ht 5' 9\" (1.753 m)   Wt 256 lb (116.1 kg)   SpO2 98%   BMI 37.80 kg/m²       Data:     Lab Results   Component Value Date     11/01/2021    K 4.2 11/01/2021     11/01/2021    CO2 27 11/01/2021    BUN 18 11/01/2021    CREATININE 0.88 11/01/2021    GLUCOSE 102 11/01/2021    PROT 6.8 11/01/2021    LABALBU 4.4 11/01/2021    BILITOT 0.45 11/01/2021    ALKPHOS 64 11/01/2021    AST 17 11/01/2021    ALT 29 11/01/2021     Lab Results   Component Value Date    WBC 6.4 11/01/2021    RBC 5.27 11/01/2021    HGB 15.0 11/01/2021    HCT 43.8 11/01/2021    MCV 83.1 11/01/2021    MCH 28.5 11/01/2021    MCHC 34.3 11/01/2021    RDW 13.3 11/01/2021     11/01/2021    MPV NOT REPORTED 11/01/2021     Lab Results   Component Value Date    TSH 1.15 08/03/2012     Lab Results   Component Value Date    CHOL 263 01/21/2020    HDL 43 01/21/2020          Assessment/Plan:        1. Class 2 obesity due to excess calories without serious comorbidity with body mass index (BMI) of 39.0 to 39.9 in adult  Pt to finish the last month of the adipex. Pt will keep exercising on his bike and start keto diet in January. Return keep January ck up.       Electronically signed by Galina Diaz MD on 12/14/2021 at 1:09 PM

## 2022-01-05 DIAGNOSIS — F41.9 ANXIETY: ICD-10-CM

## 2022-01-05 RX ORDER — CITALOPRAM 20 MG/1
TABLET ORAL
Qty: 90 TABLET | Refills: 1 | Status: SHIPPED | OUTPATIENT
Start: 2022-01-05 | End: 2022-07-05

## 2022-01-05 NOTE — TELEPHONE ENCOUNTER
Last OV: 12/14/2021  Last RX:    Next scheduled apt: 1/20/2022       Rx refill requested through [employer] for:  Citalopram 20mg 1QD #90 R-1    Rx pending.

## 2022-01-12 ENCOUNTER — TELEPHONE (OUTPATIENT)
Dept: FAMILY MEDICINE CLINIC | Age: 32
End: 2022-01-12

## 2022-01-12 ENCOUNTER — HOSPITAL ENCOUNTER (OUTPATIENT)
Dept: PREADMISSION TESTING | Age: 32
Setting detail: SPECIMEN
Discharge: HOME | End: 2022-01-12
Payer: COMMERCIAL

## 2022-01-12 DIAGNOSIS — Z20.822 SUSPECTED COVID-19 VIRUS INFECTION: ICD-10-CM

## 2022-01-12 DIAGNOSIS — Z20.822 SUSPECTED COVID-19 VIRUS INFECTION: Primary | ICD-10-CM

## 2022-01-12 LAB
SARS-COV-2, RAPID: NOT DETECTED
SPECIMEN DESCRIPTION: NORMAL

## 2022-01-12 PROCEDURE — 87635 SARS-COV-2 COVID-19 AMP PRB: CPT

## 2022-01-12 PROCEDURE — C9803 HOPD COVID-19 SPEC COLLECT: HCPCS

## 2022-01-12 NOTE — TELEPHONE ENCOUNTER
Maggi Valdez is c/o having a sore throat, sore neck and overall not feeling good. He is wanting to know if he could be tested. Symptoms started 1/11/22.     Health Maintenance   Topic Date Due    Flu vaccine (1) 09/01/2021    Depression Screen  01/21/2022    COVID-19 Vaccine (3 - Booster for Pfizer series) 04/05/2022    DTaP/Tdap/Td vaccine (2 - Td or Tdap) 08/11/2025    HIV screen  Completed    Hepatitis A vaccine  Aged Out    Hepatitis B vaccine  Aged Out    Hib vaccine  Aged Out    Meningococcal (ACWY) vaccine  Aged Out    Pneumococcal 0-64 years Vaccine  Aged Out    Varicella vaccine  Discontinued    Hepatitis C screen  Discontinued             (applicable per patient's age: Cancer Screenings, Depression Screening, Fall Risk Screening, Immunizations)    LDL Cholesterol (mg/dL)   Date Value   01/21/2020 166 (H)     AST (U/L)   Date Value   11/01/2021 17     ALT (U/L)   Date Value   11/01/2021 29     BUN (mg/dL)   Date Value   11/01/2021 18      (goal A1C is < 7)   (goal LDL is <100) need 30-50% reduction from baseline     BP Readings from Last 3 Encounters:   12/14/21 136/82   11/11/21 120/80   11/01/21 (!) 159/100    (goal /80)      All Future Testing planned in [facility]:      Next Visit Date:  Future Appointments   Date Time Provider José Miguel Chauhan   1/20/2022  9:00 AM MD Alethea Sandoval [facility]            Patient Active Problem List:     Anxiety     GERD (gastroesophageal reflux disease)     Seasonal allergies

## 2022-01-14 ENCOUNTER — E-VISIT (OUTPATIENT)
Dept: FAMILY MEDICINE CLINIC | Age: 32
End: 2022-01-14
Payer: COMMERCIAL

## 2022-01-14 DIAGNOSIS — J01.80 ACUTE NON-RECURRENT SINUSITIS OF OTHER SINUS: Primary | ICD-10-CM

## 2022-01-14 PROBLEM — F43.10 POSTTRAUMATIC STRESS DISORDER: Status: ACTIVE | Noted: 2022-01-14

## 2022-01-14 PROBLEM — F41.1 GENERALIZED ANXIETY DISORDER: Status: ACTIVE | Noted: 2022-01-14

## 2022-01-14 PROCEDURE — 99421 OL DIG E/M SVC 5-10 MIN: CPT | Performed by: FAMILY MEDICINE

## 2022-01-14 RX ORDER — DOXYCYCLINE HYCLATE 100 MG
100 TABLET ORAL 2 TIMES DAILY
Qty: 20 TABLET | Refills: 0 | Status: SHIPPED | OUTPATIENT
Start: 2022-01-14 | End: 2022-01-24

## 2022-01-14 ASSESSMENT — LIFESTYLE VARIABLES: SMOKING_STATUS: NO, I'VE NEVER SMOKED

## 2022-01-14 NOTE — PROGRESS NOTES
Pt has had the symptoms for 3d now. They are similar to previous sinus infections.  Pt had a NEgative COVID test on 1/12/22 at our hospital.  So pt continues to have symptoms so will send over some antibiotic doxycycline to his pharmacy

## 2022-01-20 ENCOUNTER — OFFICE VISIT (OUTPATIENT)
Dept: FAMILY MEDICINE CLINIC | Age: 32
End: 2022-01-20
Payer: COMMERCIAL

## 2022-01-20 VITALS
HEART RATE: 80 BPM | BODY MASS INDEX: 37.8 KG/M2 | WEIGHT: 256 LBS | DIASTOLIC BLOOD PRESSURE: 70 MMHG | SYSTOLIC BLOOD PRESSURE: 120 MMHG | OXYGEN SATURATION: 96 %

## 2022-01-20 DIAGNOSIS — F41.9 ANXIETY: Primary | ICD-10-CM

## 2022-01-20 PROCEDURE — 99213 OFFICE O/P EST LOW 20 MIN: CPT | Performed by: FAMILY MEDICINE

## 2022-01-20 RX ORDER — ALPRAZOLAM 0.5 MG/1
0.5 TABLET ORAL NIGHTLY PRN
Qty: 30 TABLET | Refills: 0 | Status: SHIPPED | OUTPATIENT
Start: 2022-01-20 | End: 2022-04-14 | Stop reason: SDUPTHER

## 2022-01-20 ASSESSMENT — ENCOUNTER SYMPTOMS
VOMITING: 0
COUGH: 0
EYE DISCHARGE: 0
EYE REDNESS: 0
DIARRHEA: 0
SHORTNESS OF BREATH: 0

## 2022-01-20 ASSESSMENT — PATIENT HEALTH QUESTIONNAIRE - PHQ9
1. LITTLE INTEREST OR PLEASURE IN DOING THINGS: 0
2. FEELING DOWN, DEPRESSED OR HOPELESS: 0
SUM OF ALL RESPONSES TO PHQ QUESTIONS 1-9: 0
SUM OF ALL RESPONSES TO PHQ9 QUESTIONS 1 & 2: 0
SUM OF ALL RESPONSES TO PHQ QUESTIONS 1-9: 0

## 2022-01-20 NOTE — PROGRESS NOTES
HPI Notes    Name: Norma Gore  : 1990        Chief Complaint:     Chief Complaint   Patient presents with    Anxiety     chronic but stable, Pt taking Celexa daily as directed. History of Present Illness:     Norma Gore is a 27 y.o.  male who presents with Anxiety (chronic but stable, Pt taking Celexa daily as directed.)      HPI   Anxiety - Chronic but stable and doing ok. Pt was sick with a cold and COVID negative x 3 and got better on doxycycline. Pt is exercising more and his mood is \"fairly good\". Pt is getting ready to start the keto diet for the new year. Pt does have a girlfriend now. Pt is taking his celexa daily and going well. Pt takes the xanax as needed so not every day. Usually the 30 pills of xanax gets him through 3-4mos but past 2mos a little more stress on the adipex which he has since stopped. Past Medical History:     Past Medical History:   Diagnosis Date    Anxiety     Heart palpitations     Hypertension       Reviewed all health maintenance requirements and ordered appropriate tests  Health Maintenance Due   Topic Date Due    Flu vaccine (1) 2021    Depression Screen  2022       Past Surgical History:     Past Surgical History:   Procedure Laterality Date    CYST REMOVAL  2010    left wrist        Medications:       Prior to Admission medications    Medication Sig Start Date End Date Taking?  Authorizing Provider   doxycycline hyclate (VIBRA-TABS) 100 MG tablet Take 1 tablet by mouth 2 times daily for 10 days 22 Yes Taylor Smith MD   citalopram (CELEXA) 20 MG tablet take 1 tablet by mouth daily 22  Yes Taylor Smith MD   Coenzyme Q10 (CO Q 10 PO) Take 100 mg by mouth daily   Yes Historical Provider, MD   vitamin C (ASCORBIC ACID) 500 MG tablet Take 500 mg by mouth daily   Yes Historical Provider, MD   Omeprazole Magnesium (PRILOSEC OTC PO) Take by mouth daily   Yes Historical Provider, MD   magnesium oxide (MAG-OX) 400 MG tablet Take 100 mg by mouth daily Pt takes 2 tablets daily   Yes Historical Provider, MD   vitamin B-12 (CYANOCOBALAMIN) 100 MCG tablet Take 50 mcg by mouth daily   Yes Historical Provider, MD   Cholecalciferol (VITAMIN D3) 5000 UNITS TABS Pt taking 2 tablet daily   Yes Historical Provider, MD        Allergies: Other    Social History:     Tobacco:    reports that he quit smoking about 10 years ago. He has quit using smokeless tobacco.  Alcohol:      reports no history of alcohol use. Drug Use:  reports no history of drug use. Family History:     Family History   Problem Relation Age of Onset    High Cholesterol Father        Review of Systems:       Review of Systems   Constitutional: Negative for chills and fever. Eyes: Negative for discharge and redness. Respiratory: Negative for cough and shortness of breath. Cardiovascular: Negative for chest pain and palpitations. Gastrointestinal: Negative for diarrhea and vomiting. Psychiatric/Behavioral: Negative for dysphoric mood and sleep disturbance. The patient is not nervous/anxious. Physical Exam:     Physical Exam  Vitals reviewed. Constitutional:       General: He is not in acute distress. Appearance: He is well-developed. He is not ill-appearing. HENT:      Head: Normocephalic and atraumatic. Eyes:      General:         Right eye: No discharge. Left eye: No discharge. Conjunctiva/sclera: Conjunctivae normal.   Neck:      Thyroid: No thyromegaly. Cardiovascular:      Rate and Rhythm: Normal rate and regular rhythm. Heart sounds: No murmur heard. Pulmonary:      Effort: Pulmonary effort is normal.      Breath sounds: Normal breath sounds. Musculoskeletal:      Cervical back: Neck supple. Skin:     Findings: No rash. Neurological:      Mental Status: He is alert and oriented to person, place, and time.          Vitals:  /70   Pulse 80   Wt 256 lb (116.1 kg)   SpO2 96% BMI 37.80 kg/m²       Data:     Lab Results   Component Value Date     11/01/2021    K 4.2 11/01/2021     11/01/2021    CO2 27 11/01/2021    BUN 18 11/01/2021    CREATININE 0.88 11/01/2021    GLUCOSE 102 11/01/2021    PROT 6.8 11/01/2021    LABALBU 4.4 11/01/2021    BILITOT 0.45 11/01/2021    ALKPHOS 64 11/01/2021    AST 17 11/01/2021    ALT 29 11/01/2021     Lab Results   Component Value Date    WBC 6.4 11/01/2021    RBC 5.27 11/01/2021    HGB 15.0 11/01/2021    HCT 43.8 11/01/2021    MCV 83.1 11/01/2021    MCH 28.5 11/01/2021    MCHC 34.3 11/01/2021    RDW 13.3 11/01/2021     11/01/2021    MPV NOT REPORTED 11/01/2021     Lab Results   Component Value Date    TSH 1.15 08/03/2012     Lab Results   Component Value Date    CHOL 263 01/21/2020    HDL 43 01/21/2020          Assessment/Plan:        1. Anxiety  Stable and doing well on the celexa and PRN xanax         Return in about 6 months (around 7/20/2022) for Anxiety.       Electronically signed by Erwin Sanches MD on 1/20/2022 at 9:17 AM

## 2022-07-03 DIAGNOSIS — F41.9 ANXIETY: ICD-10-CM

## 2022-07-05 RX ORDER — CITALOPRAM 20 MG/1
TABLET ORAL
Qty: 90 TABLET | Refills: 1 | Status: SHIPPED | OUTPATIENT
Start: 2022-07-05

## 2022-07-05 NOTE — TELEPHONE ENCOUNTER
Last OV: 1/20/2022 anxiety  Last RX:    Next scheduled apt: 7/20/2022 anxiety           surescript requesting a refill

## 2022-07-11 ENCOUNTER — OFFICE VISIT (OUTPATIENT)
Dept: PRIMARY CARE CLINIC | Age: 32
End: 2022-07-11
Payer: COMMERCIAL

## 2022-07-11 VITALS
WEIGHT: 256 LBS | SYSTOLIC BLOOD PRESSURE: 131 MMHG | OXYGEN SATURATION: 98 % | HEIGHT: 69 IN | HEART RATE: 106 BPM | TEMPERATURE: 98.3 F | RESPIRATION RATE: 18 BRPM | BODY MASS INDEX: 37.92 KG/M2 | DIASTOLIC BLOOD PRESSURE: 89 MMHG

## 2022-07-11 DIAGNOSIS — L25.5 CONTACT DERMATITIS DUE TO PLANT: Primary | ICD-10-CM

## 2022-07-11 PROCEDURE — 99213 OFFICE O/P EST LOW 20 MIN: CPT | Performed by: NURSE PRACTITIONER

## 2022-07-11 RX ORDER — PREDNISONE 10 MG/1
TABLET ORAL
Qty: 54 TABLET | Refills: 0 | Status: SHIPPED | OUTPATIENT
Start: 2022-07-11 | End: 2022-07-20 | Stop reason: ALTCHOICE

## 2022-07-11 ASSESSMENT — ENCOUNTER SYMPTOMS
RHINORRHEA: 0
SHORTNESS OF BREATH: 0
WHEEZING: 0
NAUSEA: 0
SORE THROAT: 0
COUGH: 0
VOMITING: 0
DIARRHEA: 0

## 2022-07-11 NOTE — PATIENT INSTRUCTIONS
Patient Education        Poison [place]ks [place], [place], and [place]: Care Instructions  Overview     Poison ivy, poison oak, and poison sumac are plants that can cause a skin rash upon contact. The red, itchy rash often shows up in lines or streaks. It maycause fluid-filled blisters or large, raised hives. The rash is caused by an allergic reaction to an oil in these plants. The rash may occur when you touch the plant or when you touch objects that have come in contact with these plants. Common examples include clothing, pet fur, sportinggear, or gardening tools. You can't catch or spread the rash by touching the rash or the blister fluid. The plant oil will already have been absorbed or washed off the skin. The rash may seem to be spreading because it's still developing from earlier contact orbecause you have touched something that still has the plant oil on it. Follow-up care is a key part of your treatment and safety. Be sure to make and go to all appointments, and call your doctor if you are having problems. It's also a good idea to know your test results and keep alist of the medicines you take. How can you care for yourself at home?  If your doctor prescribed a cream, use it as directed. If your doctor prescribed medicine, take it exactly as prescribed. Call your doctor if you think you are having a problem with your medicine.  Use cold, wet cloths to reduce itching.  Take warm or cool baths with oatmeal bath products, such as Aveeno.  Keep cool, and stay out of the sun.  Leave the rash open to the air.  Wash all clothing or other things that may have come in contact with the plant oil.  Avoid most lotions and ointments until the rash heals. Calamine lotion may help relieve symptoms of a plant rash. Use it 3 or 4 times a day. To prevent exposure  If you know you will be working around poison ivy, oak, or sumac:   Use a cream or lotion to help prevent the plant oil from getting on your skin.  These products are available over the counter. ? Apply the product less than 1 hour before contact with the plant, in a thick, complete layer. ? Wash it off thoroughly within 4 hours or as soon as possible after contact with plants. The product only delays the oil from getting into your skin.  Be sure to wash your hands before and after you use the restroom. When should you call for help? Call your doctor now or seek immediate medical care if:     Your rash gets worse, and you start to feel bad and have a fever, a stiff neck, nausea, and vomiting.      You have signs of infection, such as:  ? Increased pain, swelling, warmth, or redness. ? Red streaks leading from the rash. ? Pus draining from the rash. ? A fever. Watch closely for changes in your health, and be sure to contact your doctor if:     You have new blisters or bruises, or the rash spreads and looks like a sunburn.      The rash gets worse, or it comes back after nearly disappearing.      You think a medicine you are using is making your rash worse.      Your rash does not clear up after 1 to 2 weeks of home treatment.      You have joint aches or body aches with your rash. Where can you learn more? Go to https://[employer]. org and sign in to your [employer] account. Enter V387 in the [facility] box to learn more about \"Poison [place] , [place], and [place]: Care Instructions. \"     If you do not have an account, please click on the \"Sign Up Now\" link. Current as of: November 15, 2021               Content Version: 13.3  © 2006-2022 [email or web address]. Care instructions adapted under license by [facility] ([facility]). If you have questions about a medical condition or this instruction, always ask your healthcare professional. [place] [address] any warranty or liability for your use of this information.        Patient Education        prednisone  Pronunciation: PRED ni sone  Brand: Michael  What is the most important information I should know about prednisone? You should not use prednisone if you have a fungal infection anywhere in yourbody. You should not stop using prednisone suddenly. Follow your doctor's instructions about tapering your dose. What is prednisone? Prednisone is a steroid that reduces inflammation in the body, and alsosuppresses your immune system. Prednisone is used to treat many different conditions such as hormonal disorders, skin diseases, arthritis, lupus, psoriasis, allergic conditions, ulcerative colitis, Crohn's disease, eye diseases, lung diseases, asthma, tuberculosis, blood cell disorders, kidney disorders, leukemia, lymphoma, multiple sclerosis, organ transplant rejection, swelling from a brain tumor orinjury. Prednisone may also be used for purposes not listed in this medication guide. What should I discuss with my healthcare provider before taking prednisone? You should not use prednisone if you are allergic to it, or if you have afungal infection anywhere in your body. Steroid medication can weaken your immune system, making it easier for you to get an infection or worsening an infection you already have. Tell your doctor about any illnessor infection you've had within the past several weeks. Tell your doctor if you have ever had:   heart problems, high blood pressure, or a heart attack;   glaucoma or cataracts;   herpes infection of the eyes;   past or present tuberculosis;   a parasite infection that causes diarrhea (such as threadworms);   any illness that causes diarrhea;   underactive thyroid;   diabetes;   a stomach ulcer, diverticulitis;   a colostomy or ileostomy;   osteoporosis or low bone mineral density (steroid medication can increase your risk of bone loss);   low levels of calcium or potassium in your blood;   cirrhosis or other liver disease;   mental illness or psychosis; or   a muscle disorder such as myasthenia gravis.   Long-term use of steroids may lead take two doses at one time. What happens if I overdose? Seek emergency medical attention or call the Poison Help line at 1-680.179.2228. High doses or long-term use of prednisone can lead to thinning skin, easy bruising, changes in body fat (especially in your face, neck, back, and waist), increased acne or facial hair, menstrualproblems, impotence, or loss of interest in sex. What should I avoid while taking prednisone? Do not receive a \"live\" vaccine while using prednisone. The vaccine may not work as well and may not fully protect you from disease. Live vaccines include measles, mumps, rubella (MMR), polio, rotavirus, typhoid, yellow fever, varicella (chickenpox), zoster (shingles), and nasal flu(influenza) vaccine. Avoid being near people who are sick or have infections. Call your doctor for preventive treatment if you are exposed to chickenpox or measles. These conditions can be serious or even fatal in people who are using steroidmedicine. Avoid drinking alcohol. What are the possible side effects of prednisone? Get emergency medical help if you have signs of an allergic reaction: hives; difficult breathing; swelling of your face, lips, tongue, or throat.   Call your doctor at once if you have:   muscle pain or weakness;   blurred vision, tunnel vision, eye pain, or seeing halos around lights;   severe depression, changes in personality, unusual thoughts or behavior;   bloody or tarry stools, coughing up blood or vomit that looks like coffee grounds;   swelling, rapid weight gain, feeling short of breath;   irregular heartbeats;   severe headache, pounding in your neck or ears;   decreased adrenal gland hormones --muscle weakness, tiredness, diarrhea, nausea, menstrual changes, skin discoloration, craving salty foods, and feeling light-headed; or   low potassium level --leg cramps, constipation, irregular heartbeats, fluttering in your chest, increased thirst or urination, numbness or tingling, muscle weakness or limp feeling. Prednisone can affect growth in children. Tell your doctor if your child is notgrowing at a normal rate while using this medicine. Common side effects may include:   weight gain (especially in your face or your upper back and torso);   increased appetite;   mood changes, trouble sleeping;   changes in your menstrual periods;   problems with memory or thought;   muscle or joint pain;   weakness;   headache, dizziness, spinning sensation;   nausea, bloating, loss of appetite;   slow wound healing; or   acne, increased sweating, thinning skin, bruising, pinpoint spots under your skin. This is not a complete list of side effects and others may occur. Call your doctor for medical advice about side effects. You may report side effects toFDA at 3-328-JCM-7073. What other drugs will affect prednisone? Sometimes it is not safe to use certain medications at the same time. Some drugs can affect your blood levels of other drugs you take, which mayincrease side effects or make the medications less effective. Tell your doctor about all your current medicines. Many drugs can affect prednisone, especially:   bupropion;   cyclosporine;   digoxin;   ketoconazole;   an antibiotic;   birth control pills or hormone replacement therapy;   a diuretic or \"water pill\";   insulin or oral diabetes medicine;   a blood thinner --warfarin, Coumadin, Jantoven; or   NSAIDs (nonsteroidal anti-inflammatory drugs) --aspirin, ibuprofen (Advil, Motrin), naproxen (Aleve), celecoxib, diclofenac, indomethacin, meloxicam, and others. This list is not complete and many other drugs may affect prednisone. This includes prescription and over-the-counter medicines, vitamins, andherbal products. Not all possible drug interactions are listed here. Where can I get more information? Your pharmacist can provide more information about prednisone.   Remember, keep this and all other medicines out of the reach of children, never share your medicines with others, and use this medication only for the indication prescribed. Every effort has been made to ensure that the information provided by [address] is accurate, up-to-date, and complete, but no guarantee is made to that effect. Drug information contained herein may be time sensitive. [facility] information has been compiled for use by healthcare practitioners and consumers in the [place] and therefore [facility] does not warrant that uses outside of the [place] are appropriate, unless specifically indicated otherwise. [facility]'s drug information does not endorse drugs, diagnose patients or recommend therapy. [facility]'s drug information is an informational resource designed to assist licensed healthcare practitioners in caring for their patients and/or to serve consumers viewing this service as a supplement to, and not a substitute for, the expertise, skill, knowledge and judgment of healthcare practitioners. The absence of a warning for a given drug or drug combination in no way should be construed to indicate that the drug or drug combination is safe, effective or appropriate for any given patient. [facility] does not assume any responsibility for any aspect of healthcare administered with the aid of information [facility] provides. The information contained herein is not intended to cover all possible uses, directions, precautions, warnings, drug interactions, allergic reactions, or adverse effects. If you have questions about the drugs you are taking, check with yourdoctor, nurse or pharmacist.  Copyright 0118-0404 [address]. Version: 10.01. Revision date:3/28/2019. Care instructions adapted under license by [facility] ([facility]). If you have questions about a medical condition or this instruction, always ask your healthcare professional. [place] [address] any warranty or liability for your use of this information. · If avoidance is not possible, apply a barrier lotion such as [employer], [employer] [place] [employer] [place], Zinc Oxide paste or Desenex prior to potential exposure. · Exposed clothing, shoes, tools, camping equipment and pets are to be washed   thoroughly to remove allergen. ·  If contact occurs, wash skin with soap and water or Zanfel within 15 minutes of contact. · Prednisone 10 mg tablets, 6 tablets on Days 1 thru 4, 5 tablets on Days 5 and 6, 4 tablets on Days 7 and 8, 3 tablets on Days 9 and 10, 2 tablets on Days 11 and 12 and 1 tablet on Days 13 and 14. Complete all doses as prescribed. Denies history of impaired liver function, diabetes, CHF, systemic fungal infection, osteoporosis, or glaucoma. Take with food at same time each day. Take the prednisone taper as directed on the prescription. Prednisone is very bitter so swallow tablets quickly to prevent dissolving in mouth. After taking, don't lay down for 2 hours to help prevent reflux. · Cetirizine 10 mg tablet by mouth twice a day for itching. · Pepcid 20 mg tablet by mouth once daily in combination with Cetirizine. · Oatmeal baths or cool compresses to soothe itching  · Patient instructions given for Poison Ivy and Prednisone. · Follow up with PCP immediately if symptoms worsen or signs of secondary infection   develop, such as fever, purulent drainage and/or increasing pain. · Follow up with PCP in 3-4 days for re-evaluation of dermatitis requiring topical or oral   corticosteroid use. · To ER or call 911 if any difficulty breathing, shortness of breath, inability to swallow, hives, facial/tongue swelling or temp greater than 103 degrees.

## 2022-07-11 NOTE — PROGRESS NOTES
[address]    [facility] [address]  Dept: [phone]  Dept Fax: [phone]    Barbara Pierce is a 27 y.o. male who [facility] to the [facility] Care today for hismedical conditions/complaints as noted below. Scottie Oshea is c/o of Wattics (Started a week ago on B/L legs, neck, arms and Genitals. )      HPI:     Poison V[place]  This is a new problem. The current episode started 1 to 4 weeks ago (Started a week ago with poison ivy on B/L arms, legs, neck and genitals after exposure to poion ivy.). The problem has been gradually worsening since onset. The affected locations include the neck, genitalia, left arm, left lower leg, right arm and right lower leg (B/L armpits). The rash is characterized by redness and itchiness. He was exposed to plant contact. Pertinent negatives include no congestion, cough, diarrhea, fatigue, fever, rhinorrhea, shortness of breath, sore throat or vomiting. Past treatments include anti-itch cream and antihistamine (calamine, benadryl and claritin). The treatment provided no relief. His past medical history is significant for allergies (ragweed). There is no history of asthma, eczema or varicella.        Past Medical History:   Diagnosis Date    Anxiety     Heart palpitations     Hypertension         Current Outpatient Medications   Medication Sig Dispense Refill    predniSONE (DELTASONE) 10 MG tablet Take 6 tabs Days 1-4, 5 tabs Days 5-6, 4 tabs Days 7-8, 3 tabs Days 9-10, 2 tabs Days 11-12, 1 tab Day 13-14 54 tablet 0    citalopram (CELEXA) 20 MG tablet take 1 tablet by mouth daily 90 tablet 1    Coenzyme Q10 (CO Q 10 PO) Take 100 mg by mouth daily      vitamin C (ASCORBIC ACID) 500 MG tablet Take 500 mg by mouth daily      Omeprazole Magnesium (PRILOSEC OTC PO) Take by mouth daily      magnesium oxide (MAG-OX) 400 MG tablet Take 100 mg by mouth daily Pt takes 2 tablets daily      vitamin B-12 (CYANOCOBALAMIN) 100 MCG tablet Take 50 mcg by mouth daily      Cholecalciferol (VITAMIN D3) 5000 UNITS TABS Pt taking 2 tablet daily       No current facility-administered medications for this visit. Allergies   Allergen Reactions    Other Other (See Comments)     Ragweed         :     Review of Systems   Constitutional: Negative for appetite change, chills, diaphoresis, fatigue and fever. HENT: Negative for congestion, ear pain, rhinorrhea and sore throat. Respiratory: Negative for cough, shortness of breath and wheezing. Gastrointestinal: Negative for diarrhea, nausea and vomiting. Skin: Positive for rash. Negative for wound. Neurological: Negative for dizziness, light-headedness and headaches.       :     Physical Exam  Vitals and nursing note reviewed. Constitutional:       General: He is not in acute distress. Appearance: Normal appearance. He is well-developed. He is not ill-appearing or diaphoretic. Comments: Well hydrated, nontoxic appearance. HENT:      Head: Normocephalic and atraumatic. Right Ear: External ear normal.      Left Ear: External ear normal.   Eyes:      Conjunctiva/sclera: Conjunctivae normal.   Cardiovascular:      Rate and Rhythm: Regular rhythm. Tachycardia present. Heart sounds: Normal heart sounds, S1 normal and S2 normal. No murmur heard. No friction rub. No gallop. Pulmonary:      Effort: Pulmonary effort is normal. No accessory muscle usage or respiratory distress. Breath sounds: Normal breath sounds and air entry. No decreased breath sounds, wheezing, rhonchi or rales. Comments: No cough. Breath sounds clear B/L anterior and posterior lobes. Chest expansion symmetrical.  No audible wheezing or respiratory distress. No rales or rhonchi. Musculoskeletal:         General: Normal range of motion. Cervical back: Neck supple. Lymphadenopathy:      Cervical: No cervical adenopathy.       Right 4, 5 tablets on Days 5 and 6, 4 tablets on Days 7 and 8, 3 tablets on Days 9 and 10, 2 tablets on Days 11 and 12 and 1 tablet on Days 13 and 14. Complete all doses as prescribed. Denies history of impaired liver function, diabetes, CHF, systemic fungal infection, osteoporosis, or glaucoma. Take with food at same time each day. Take the prednisone taper as directed on the prescription. Prednisone is very bitter so swallow tablets quickly to prevent dissolving in mouth. After taking, don't lay down for 2 hours to help prevent reflux. · Cetirizine 10 mg tablet by mouth twice a day for itching. · Pepcid 20 mg tablet by mouth once daily in combination with Cetirizine. · Oatmeal baths or cool compresses to soothe itching  · Patient instructions given for Poison Ivy and Prednisone. · Follow up with PCP immediately if symptoms worsen or signs of secondary infection   develop, such as fever, purulent drainage and/or increasing pain. · Follow up with PCP in 3-4 days for re-evaluation of dermatitis requiring topical or oral   corticosteroid use. · To ER or call 911 if any difficulty breathing, shortness of breath, inability to swallow, hives, facial/tongue swelling or temp greater than 103 degrees. Anthony Adams received counseling on the following healthy behaviors: medication adherence. Patient given educational materials - see patient instructions. Discussed use, benefit, and side effects of prescribed medications. Treatment plan discussed at visit. Continue routine health care follow up. All patient questions answered. Pt voiced understanding.       Electronically signed by MICHEAL Ball CNP on 7/12/2022 at 9:33 PM

## 2022-07-20 ENCOUNTER — OFFICE VISIT (OUTPATIENT)
Dept: FAMILY MEDICINE CLINIC | Age: 32
End: 2022-07-20
Payer: COMMERCIAL

## 2022-07-20 VITALS
OXYGEN SATURATION: 97 % | SYSTOLIC BLOOD PRESSURE: 126 MMHG | WEIGHT: 270 LBS | HEART RATE: 68 BPM | HEIGHT: 69 IN | BODY MASS INDEX: 39.99 KG/M2 | DIASTOLIC BLOOD PRESSURE: 86 MMHG

## 2022-07-20 DIAGNOSIS — K21.9 GASTROESOPHAGEAL REFLUX DISEASE WITHOUT ESOPHAGITIS: ICD-10-CM

## 2022-07-20 DIAGNOSIS — F41.9 ANXIETY: Primary | ICD-10-CM

## 2022-07-20 PROCEDURE — 99213 OFFICE O/P EST LOW 20 MIN: CPT | Performed by: FAMILY MEDICINE

## 2022-07-20 RX ORDER — ALPRAZOLAM 0.5 MG/1
0.5 TABLET ORAL NIGHTLY PRN
Qty: 30 TABLET | Refills: 0 | Status: SHIPPED | OUTPATIENT
Start: 2022-07-20 | End: 2022-09-26 | Stop reason: SDUPTHER

## 2022-07-20 ASSESSMENT — ENCOUNTER SYMPTOMS
ABDOMINAL PAIN: 0
CHOKING: 0
HEARTBURN: 0
COUGH: 0
SORE THROAT: 0

## 2022-07-20 ASSESSMENT — PATIENT HEALTH QUESTIONNAIRE - PHQ9
SUM OF ALL RESPONSES TO PHQ9 QUESTIONS 1 & 2: 0
2. FEELING DOWN, DEPRESSED OR HOPELESS: 0
SUM OF ALL RESPONSES TO PHQ QUESTIONS 1-9: 0
1. LITTLE INTEREST OR PLEASURE IN DOING THINGS: 0

## 2022-07-20 NOTE — PROGRESS NOTES
HPI Notes    Name: Adam Sagastume  : 1990        Chief Complaint:     Chief Complaint   Patient presents with    Mental Health Problem     6 month check up. Doing well on celexa. Gastroesophageal Reflux     Doing well on prilosec otc       History of Present Illness:     Adam Sagastume is a 27 y.o.  male who presents with Mental Health Problem (6 month check up. Doing well on celexa.) and Gastroesophageal Reflux (Doing well on prilosec otc)      Mental Health Problem  The primary symptoms do not include dysphoric mood. Primary symptoms comment: Pt is doing ok. Just \"normal life\" and nothing debilitating just busy with work. Pt is doing well taking his celexa and as needed he has the xanax  Pt was on the prednisone for poison ivy and so feels better off the prednisone. . The current episode started more than 1 month ago. This is a chronic problem. The onset of the illness is precipitated by a stressful event. The degree of incapacity that he is experiencing as a consequence of his illness is mild. Additional symptoms of the illness do not include insomnia, hypersomnia, appetite change or abdominal pain. He does not admit to suicidal ideas. He does not have a plan to attempt suicide. He does not contemplate harming himself. He has not already injured self. He does not contemplate injuring another person. He has not already  injured another person. Gastroesophageal Reflux  He reports no abdominal pain, no choking, no coughing, no dysphagia, no heartburn or no sore throat. Pt is doing ok. Just \"normal life\" and nothing debilitating just busy with work. Pt is doing well taking his celexa and as needed he has the xanax  Pt was on the prednisone for poison ivy and so feels better off the prednisone. . This is a chronic problem. The current episode started more than 1 year ago. The problem has been unchanged. Pertinent negatives include no melena or weight loss.  He has tried a PPI (prilosec is managing all fine) for the symptoms. Past Medical History:     Past Medical History:   Diagnosis Date    Anxiety     Heart palpitations     Hypertension       Reviewed all health maintenance requirements and ordered appropriate tests  Health Maintenance Due   Topic Date Due    COVID-19 Vaccine (3 - Booster for Velez Peter series) 03/05/2022       Past Surgical History:     Past Surgical History:   Procedure Laterality Date    CYST REMOVAL  7/2010    left wrist        Medications:       Prior to Admission medications    Medication Sig Start Date End Date Taking? Authorizing Provider   citalopram (CELEXA) 20 MG tablet take 1 tablet by mouth daily 7/5/22  Yes Babita Lockwood MD   Coenzyme Q10 (CO Q 10 PO) Take 100 mg by mouth daily   Yes Historical Provider, MD   vitamin C (ASCORBIC ACID) 500 MG tablet Take 500 mg by mouth daily   Yes Historical Provider, MD   Omeprazole Magnesium (PRILOSEC OTC PO) Take by mouth daily   Yes Historical Provider, MD   magnesium oxide (MAG-OX) 400 MG tablet Take 100 mg by mouth daily Pt takes 2 tablets daily   Yes Historical Provider, MD   vitamin B-12 (CYANOCOBALAMIN) 100 MCG tablet Take 50 mcg by mouth daily   Yes Historical Provider, MD   Cholecalciferol (VITAMIN D3) 5000 UNITS TABS Pt taking 2 tablet daily   Yes Historical Provider, MD        Allergies: Other    Social History:     Tobacco:    reports that he quit smoking about 10 years ago. His smoking use included cigarettes. He has quit using smokeless tobacco.  Alcohol:      reports no history of alcohol use. Drug Use:  reports no history of drug use. Family History:     Family History   Problem Relation Age of Onset    High Cholesterol Father        Review of Systems:       Review of Systems   Constitutional:  Negative for appetite change and weight loss. HENT:  Negative for sore throat. Respiratory:  Negative for cough and choking.     Gastrointestinal:  Negative for abdominal pain, dysphagia, heartburn and melena. Psychiatric/Behavioral:  Negative for dysphoric mood. The patient does not have insomnia. Physical Exam:     Physical Exam  Constitutional:       General: He is not in acute distress. Appearance: Normal appearance. He is well-developed. He is not ill-appearing. HENT:      Head: Normocephalic and atraumatic. Eyes:      Conjunctiva/sclera: Conjunctivae normal.   Neck:      Thyroid: No thyromegaly. Cardiovascular:      Rate and Rhythm: Normal rate and regular rhythm. Heart sounds: No murmur heard. Pulmonary:      Effort: Pulmonary effort is normal. No respiratory distress. Breath sounds: Normal breath sounds. Abdominal:      Palpations: Abdomen is soft. Tenderness: There is no abdominal tenderness. Musculoskeletal:      Cervical back: Neck supple. Skin:     Findings: No rash. Neurological:      Mental Status: He is alert and oriented to person, place, and time.    Psychiatric:         Mood and Affect: Mood normal.         Behavior: Behavior normal.       Vitals:  /86 (Site: Left Upper Arm, Position: Sitting, Cuff Size: Large Adult)   Pulse 68   Ht 5' 9\" (1.753 m)   Wt 270 lb (122.5 kg)   SpO2 97%   BMI 39.87 kg/m²       Data:     Lab Results   Component Value Date/Time     11/01/2021 07:23 AM    K 4.2 11/01/2021 07:23 AM     11/01/2021 07:23 AM    CO2 27 11/01/2021 07:23 AM    BUN 18 11/01/2021 07:23 AM    CREATININE 0.88 11/01/2021 07:23 AM    GLUCOSE 102 11/01/2021 07:23 AM    PROT 6.8 11/01/2021 07:23 AM    LABALBU 4.4 11/01/2021 07:23 AM    BILITOT 0.45 11/01/2021 07:23 AM    ALKPHOS 64 11/01/2021 07:23 AM    AST 17 11/01/2021 07:23 AM    ALT 29 11/01/2021 07:23 AM     Lab Results   Component Value Date/Time    WBC 6.4 11/01/2021 07:23 AM    RBC 5.27 11/01/2021 07:23 AM    HGB 15.0 11/01/2021 07:23 AM    HCT 43.8 11/01/2021 07:23 AM    MCV 83.1 11/01/2021 07:23 AM    MCH 28.5 11/01/2021 07:23 AM    MCHC 34.3 11/01/2021 07:23 AM    RDW 13.3 11/01/2021 07:23 AM     11/01/2021 07:23 AM    MPV NOT REPORTED 11/01/2021 07:23 AM     Lab Results   Component Value Date/Time    TSH 1.15 08/03/2012 01:57 PM     Lab Results   Component Value Date/Time    CHOL 263 01/21/2020 08:35 AM    HDL 43 01/21/2020 08:35 AM          Assessment/Plan:        1. Anxiety  Stable on the celexa and xanax     2. Gastroesophageal reflux disease without esophagitis  Stable on the prilosec         Return in about 6 months (around 1/20/2023) for Anxiety, gerd.       Electronically signed by Juana Miller MD on 7/20/2022 at 10:40 AM

## 2022-09-26 ENCOUNTER — PATIENT MESSAGE (OUTPATIENT)
Dept: FAMILY MEDICINE CLINIC | Age: 32
End: 2022-09-26

## 2022-09-26 DIAGNOSIS — F41.9 ANXIETY: ICD-10-CM

## 2022-09-26 RX ORDER — ALPRAZOLAM 0.5 MG/1
0.5 TABLET ORAL NIGHTLY PRN
Qty: 30 TABLET | Refills: 1 | Status: SHIPPED | OUTPATIENT
Start: 2022-09-26 | End: 2022-11-10

## 2022-09-26 NOTE — TELEPHONE ENCOUNTER
From: Yasemin Devisam  To: Dr. Majo Cordero: 9/26/2022 9:30 AM EDT  Subject: Refill    Good morning Dr. Aleida Mooney,   I am down to just 1 xanax left and would like to request a refill please. Sandra Sauceda is fine, thanks and have a good day!

## 2022-10-03 ENCOUNTER — OFFICE VISIT (OUTPATIENT)
Dept: FAMILY MEDICINE CLINIC | Age: 32
End: 2022-10-03
Payer: COMMERCIAL

## 2022-10-03 VITALS
BODY MASS INDEX: 40.73 KG/M2 | HEART RATE: 84 BPM | OXYGEN SATURATION: 98 % | SYSTOLIC BLOOD PRESSURE: 130 MMHG | WEIGHT: 275 LBS | DIASTOLIC BLOOD PRESSURE: 86 MMHG | HEIGHT: 69 IN

## 2022-10-03 DIAGNOSIS — E66.09 CLASS 2 OBESITY DUE TO EXCESS CALORIES WITHOUT SERIOUS COMORBIDITY WITH BODY MASS INDEX (BMI) OF 39.0 TO 39.9 IN ADULT: ICD-10-CM

## 2022-10-03 PROCEDURE — 99213 OFFICE O/P EST LOW 20 MIN: CPT | Performed by: FAMILY MEDICINE

## 2022-10-03 RX ORDER — PHENTERMINE HYDROCHLORIDE 37.5 MG/1
37.5 TABLET ORAL
Qty: 30 TABLET | Refills: 0 | Status: SHIPPED | OUTPATIENT
Start: 2022-10-03 | End: 2022-11-02 | Stop reason: SDUPTHER

## 2022-10-03 SDOH — ECONOMIC STABILITY: FOOD INSECURITY: WITHIN THE PAST 12 MONTHS, YOU WORRIED THAT YOUR FOOD WOULD RUN OUT BEFORE YOU GOT MONEY TO BUY MORE.: NEVER TRUE

## 2022-10-03 SDOH — ECONOMIC STABILITY: FOOD INSECURITY: WITHIN THE PAST 12 MONTHS, THE FOOD YOU BOUGHT JUST DIDN'T LAST AND YOU DIDN'T HAVE MONEY TO GET MORE.: NEVER TRUE

## 2022-10-03 ASSESSMENT — ENCOUNTER SYMPTOMS
COUGH: 0
NAUSEA: 0
EYE DISCHARGE: 0
ABDOMINAL PAIN: 0
DIARRHEA: 0
BLOOD IN STOOL: 0
CONSTIPATION: 0
EYE REDNESS: 0
VOMITING: 0
SHORTNESS OF BREATH: 0

## 2022-10-03 ASSESSMENT — SOCIAL DETERMINANTS OF HEALTH (SDOH): HOW HARD IS IT FOR YOU TO PAY FOR THE VERY BASICS LIKE FOOD, HOUSING, MEDICAL CARE, AND HEATING?: NOT HARD AT ALL

## 2022-10-03 NOTE — PROGRESS NOTES
HPI Notes    Name: Azul Gutierrez  : 1990        Chief Complaint:     Chief Complaint   Patient presents with    Obesity     Pt would like to discuss trying Adipex again. History of Present Illness:     Azul Gutierrez is a 27 y.o.  male who presents with Obesity (Pt would like to discuss trying Adipex again. )      HPI  Obesity - pt is really frustrated with his weight. Pt has gained 20lbs in past 6mos or so. Pt has lost some weight in the past. Pt is a  and gained weight during busy season as so busy --eats good breakfast but then lunch on the go and supper can be later. But pt and his girlfriend are doing the weight loss and better eating together. They have bought foods to \"meal prep\" and he is going to be able to go home for lunch more now. Pt has not been exercising either so they are wanting to go on stationary bike or gym or Crossfit. Pt needs to drink more water too. Pt would like to go back on adipex too to help. Past Medical History:     Past Medical History:   Diagnosis Date    Anxiety     Heart palpitations     Hypertension       Reviewed all health maintenance requirements and ordered appropriate tests  Health Maintenance Due   Topic Date Due    Flu vaccine (1) 2022       Past Surgical History:     Past Surgical History:   Procedure Laterality Date    CYST REMOVAL  2010    left wrist        Medications:       Prior to Admission medications    Medication Sig Start Date End Date Taking? Authorizing Provider   phentermine (ADIPEX-P) 37.5 MG tablet Take 1 tablet by mouth every morning (before breakfast) for 30 days. 10/3/22 11/2/22 Yes Jonathan Mata MD   ALPRAZolam [facility]) 0.5 MG tablet Take 1 tablet by mouth nightly as needed for Anxiety for up to 45 days.  9/26/22 11/10/22 Yes Jonathan Mata MD   citalopram (CELEXA) 20 MG tablet take 1 tablet by mouth daily 22  Yes Jonathan Mata MD   Omeprazole Magnesium (PRILOSEC OTC PO) Take by mouth daily   Yes Historical Provider, MD   magnesium oxide (MAG-OX) 400 MG tablet Take 100 mg by mouth daily Pt takes 2 tablets daily   Yes Historical Provider, MD   vitamin B-12 (CYANOCOBALAMIN) 100 MCG tablet Take 50 mcg by mouth daily   Yes Historical Provider, MD   Cholecalciferol (VITAMIN D3) 5000 UNITS TABS Pt taking 2 tablet daily   Yes Historical Provider, MD   Coenzyme Q10 (CO Q 10 PO) Take 100 mg by mouth daily  Patient not taking: Reported on 10/3/2022    Historical Provider, MD   vitamin C (ASCORBIC ACID) 500 MG tablet Take 500 mg by mouth daily  Patient not taking: Reported on 10/3/2022    Historical Provider, MD        Allergies: Other    Social History:     Tobacco:    reports that he quit smoking about 11 years ago. His smoking use included cigarettes. He has quit using smokeless tobacco.  Alcohol:      reports no history of alcohol use. Drug Use:  reports no history of drug use. Family History:     Family History   Problem Relation Age of Onset    High Cholesterol Father        Review of Systems:       Review of Systems   Constitutional:  Negative for chills, fatigue and fever. Eyes:  Negative for discharge and redness. Respiratory:  Negative for cough and shortness of breath. Cardiovascular:  Negative for chest pain, palpitations and leg swelling. Gastrointestinal:  Negative for abdominal pain, blood in stool, constipation, diarrhea, nausea and vomiting. Skin:  Negative for rash. Neurological:  Negative for dizziness and facial asymmetry. Psychiatric/Behavioral:  Negative for sleep disturbance. Physical Exam:     Physical Exam  Vitals reviewed. Constitutional:       General: He is not in acute distress. Appearance: Normal appearance. He is well-developed. He is not ill-appearing. HENT:      Head: Normocephalic and atraumatic. Eyes:      Conjunctiva/sclera: Conjunctivae normal.      Pupils: Pupils are equal, round, and reactive to light.    Neck: Thyroid: No thyromegaly. Cardiovascular:      Rate and Rhythm: Normal rate and regular rhythm. Heart sounds: No murmur heard. Pulmonary:      Effort: Pulmonary effort is normal.      Breath sounds: Normal breath sounds. Abdominal:      General: Bowel sounds are normal.      Palpations: Abdomen is soft. Tenderness: There is no abdominal tenderness. Musculoskeletal:      Cervical back: Neck supple. Skin:     General: Skin is warm and dry. Findings: No rash. Neurological:      Mental Status: He is alert and oriented to person, place, and time. Vitals:  /86   Pulse 84   Ht 5' 9\" (1.753 m)   Wt 275 lb (124.7 kg)   SpO2 98%   BMI 40.61 kg/m²       Data:     Lab Results   Component Value Date/Time     11/01/2021 07:23 AM    K 4.2 11/01/2021 07:23 AM     11/01/2021 07:23 AM    CO2 27 11/01/2021 07:23 AM    BUN 18 11/01/2021 07:23 AM    CREATININE 0.88 11/01/2021 07:23 AM    GLUCOSE 102 11/01/2021 07:23 AM    PROT 6.8 11/01/2021 07:23 AM    LABALBU 4.4 11/01/2021 07:23 AM    BILITOT 0.45 11/01/2021 07:23 AM    ALKPHOS 64 11/01/2021 07:23 AM    AST 17 11/01/2021 07:23 AM    ALT 29 11/01/2021 07:23 AM     Lab Results   Component Value Date/Time    WBC 6.4 11/01/2021 07:23 AM    RBC 5.27 11/01/2021 07:23 AM    HGB 15.0 11/01/2021 07:23 AM    HCT 43.8 11/01/2021 07:23 AM    MCV 83.1 11/01/2021 07:23 AM    MCH 28.5 11/01/2021 07:23 AM    MCHC 34.3 11/01/2021 07:23 AM    RDW 13.3 11/01/2021 07:23 AM     11/01/2021 07:23 AM    MPV NOT REPORTED 11/01/2021 07:23 AM     Lab Results   Component Value Date/Time    TSH 1.15 08/03/2012 01:57 PM     Lab Results   Component Value Date/Time    CHOL 263 01/21/2020 08:35 AM    HDL 43 01/21/2020 08:35 AM          Assessment/Plan:        1.  Class 2 obesity due to excess calories without serious comorbidity with body mass index (BMI) of 39.0 to 39.9 in adult  Pt may try one more time adipex but really needs to meal prep and start exercising again  - phentermine (ADIPEX-P) 37.5 MG tablet; Take 1 tablet by mouth every morning (before breakfast) for 30 days. Dispense: 30 tablet; Refill: 0        Return in about 4 weeks (around 10/31/2022) for obesity.       Electronically signed by Mauro Duncan MD on 10/3/2022 at 10:45 PM

## 2022-11-02 ENCOUNTER — OFFICE VISIT (OUTPATIENT)
Dept: FAMILY MEDICINE CLINIC | Age: 32
End: 2022-11-02
Payer: COMMERCIAL

## 2022-11-02 VITALS
DIASTOLIC BLOOD PRESSURE: 84 MMHG | SYSTOLIC BLOOD PRESSURE: 134 MMHG | HEIGHT: 69 IN | OXYGEN SATURATION: 97 % | BODY MASS INDEX: 38.51 KG/M2 | WEIGHT: 260 LBS | HEART RATE: 72 BPM

## 2022-11-02 DIAGNOSIS — E66.09 CLASS 2 OBESITY DUE TO EXCESS CALORIES WITHOUT SERIOUS COMORBIDITY WITH BODY MASS INDEX (BMI) OF 39.0 TO 39.9 IN ADULT: ICD-10-CM

## 2022-11-02 PROCEDURE — 99213 OFFICE O/P EST LOW 20 MIN: CPT | Performed by: FAMILY MEDICINE

## 2022-11-02 RX ORDER — PHENTERMINE HYDROCHLORIDE 37.5 MG/1
37.5 TABLET ORAL
Qty: 30 TABLET | Refills: 0 | Status: SHIPPED | OUTPATIENT
Start: 2022-11-02 | End: 2022-12-02

## 2022-11-02 ASSESSMENT — ENCOUNTER SYMPTOMS
EYE REDNESS: 0
SHORTNESS OF BREATH: 0
DIARRHEA: 0
EYE DISCHARGE: 0
COUGH: 0
VOMITING: 0

## 2022-11-02 NOTE — PROGRESS NOTES
HPI Notes    Name: Gaagndeep Valentino  : 1990        Chief Complaint:     Chief Complaint   Patient presents with    Weight Management     1 month on adipex. Started at 275 lbs 10/3/22. Loss of 15 lbs past month. 2nd refill adipex. History of Present Illness:     Gagandeep Valentino is a 27 y.o.  male who presents with Weight Management (1 month on adipex. Started at 275 lbs 10/3/22. Loss of 15 lbs past month. 2nd refill adipex. )      HPI  Obesity - pt has been back on the adipex and has lost 15lbs in one month. Pt has been improving diet. Pt is logging his meals and eating more proteins and veggies. Pt's girlfriend is also on healthy diet. Pt has a stationary bike and bought a 40lb vest to wear to walk the dogs. Pt is doing well. No side effects as he has been on the adipe before. BP controlled. Past Medical History:     Past Medical History:   Diagnosis Date    Anxiety     Heart palpitations     Hypertension       Reviewed all health maintenance requirements and ordered appropriate tests  Health Maintenance Due   Topic Date Due    Flu vaccine (1) 2022       Past Surgical History:     Past Surgical History:   Procedure Laterality Date    CYST REMOVAL  2010    left wrist        Medications:       Prior to Admission medications    Medication Sig Start Date End Date Taking? Authorizing Provider   phentermine (ADIPEX-P) 37.5 MG tablet Take 1 tablet by mouth every morning (before breakfast) for 30 days. 10/3/22 11/2/22  Benito Lennox, MD   ALPRAZolam Zofia Regulus) 0.5 MG tablet Take 1 tablet by mouth nightly as needed for Anxiety for up to 45 days.  9/26/22 11/10/22  Benito Lennox, MD   citalopram (CELEXA) 20 MG tablet take 1 tablet by mouth daily 22   Benito Lennox, MD   Coenzyme Q10 (CO Q 10 PO) Take 100 mg by mouth daily  Patient not taking: Reported on 10/3/2022    Historical Provider, MD   vitamin C (ASCORBIC ACID) 500 MG tablet Take 500 mg by mouth daily  Patient not taking: Reported on 10/3/2022    Historical Provider, MD   Omeprazole Magnesium (PRILOSEC OTC PO) Take by mouth daily    Historical Provider, MD   magnesium oxide (MAG-OX) 400 MG tablet Take 100 mg by mouth daily Pt takes 2 tablets daily    Historical Provider, MD   vitamin B-12 (CYANOCOBALAMIN) 100 MCG tablet Take 50 mcg by mouth daily    Historical Provider, MD   Cholecalciferol (VITAMIN D3) 5000 UNITS TABS Pt taking 2 tablet daily    Historical Provider, MD        Allergies: Other    Social History:     Tobacco:    reports that he quit smoking about 11 years ago. His smoking use included cigarettes. He has quit using smokeless tobacco.  Alcohol:      reports no history of alcohol use. Drug Use:  reports no history of drug use. Family History:     Family History   Problem Relation Age of Onset    High Cholesterol Father        Review of Systems:       Review of Systems   Constitutional:  Negative for chills, fatigue and fever. Eyes:  Negative for discharge and redness. Respiratory:  Negative for cough and shortness of breath. Cardiovascular:  Negative for chest pain and palpitations. Gastrointestinal:  Negative for diarrhea and vomiting. Skin:  Negative for rash. Neurological:  Negative for dizziness and facial asymmetry. Physical Exam:     Physical Exam  Vitals reviewed. Constitutional:       General: He is not in acute distress. Appearance: Normal appearance. He is not ill-appearing. HENT:      Head: Normocephalic and atraumatic. Cardiovascular:      Rate and Rhythm: Normal rate and regular rhythm. Heart sounds: Normal heart sounds. Pulmonary:      Effort: Pulmonary effort is normal. No respiratory distress. Breath sounds: Normal breath sounds. Musculoskeletal:      Cervical back: Neck supple. Neurological:      Mental Status: He is alert.        Vitals:  /84   Pulse 72   Ht 5' 9\" (1.753 m)   Wt 260 lb (117.9 kg)   SpO2 97%   BMI 38.40 kg/m² Data:     Lab Results   Component Value Date/Time     11/01/2021 07:23 AM    K 4.2 11/01/2021 07:23 AM     11/01/2021 07:23 AM    CO2 27 11/01/2021 07:23 AM    BUN 18 11/01/2021 07:23 AM    CREATININE 0.88 11/01/2021 07:23 AM    GLUCOSE 102 11/01/2021 07:23 AM    PROT 6.8 11/01/2021 07:23 AM    LABALBU 4.4 11/01/2021 07:23 AM    BILITOT 0.45 11/01/2021 07:23 AM    ALKPHOS 64 11/01/2021 07:23 AM    AST 17 11/01/2021 07:23 AM    ALT 29 11/01/2021 07:23 AM     Lab Results   Component Value Date/Time    WBC 6.4 11/01/2021 07:23 AM    RBC 5.27 11/01/2021 07:23 AM    HGB 15.0 11/01/2021 07:23 AM    HCT 43.8 11/01/2021 07:23 AM    MCV 83.1 11/01/2021 07:23 AM    MCH 28.5 11/01/2021 07:23 AM    MCHC 34.3 11/01/2021 07:23 AM    RDW 13.3 11/01/2021 07:23 AM     11/01/2021 07:23 AM    MPV NOT REPORTED 11/01/2021 07:23 AM     Lab Results   Component Value Date/Time    TSH 1.15 08/03/2012 01:57 PM     Lab Results   Component Value Date/Time    CHOL 263 01/21/2020 08:35 AM    HDL 43 01/21/2020 08:35 AM          Assessment/Plan:        1. Class 2 obesity due to excess calories without serious comorbidity with body mass index (BMI) of 39.0 to 39.9 in adult  Doing well and losing weight bu needs to keep with the low carb diet and exercising. Pt may have one month of the adipex      Return in about 4 weeks (around 11/30/2022) for obesity.       Electronically signed by Francisca Robledo MD on 11/2/2022 at 9:44 AM

## 2022-11-02 NOTE — PATIENT INSTRUCTIONS
Survey: You may be receiving a survey from [id number] regarding your visit today. You may get this in the mail, through your MyChart or in your email. Please complete the survey to enable us to provide the highest quality of care to you and your family. Please also, mention our names. If you cannot score us as very good (5 Stars) on any question, please feel free to call the office to discuss how we could have made your experience exceptional.      Thank You!         MD Helen Hazel LPN

## 2022-12-05 ENCOUNTER — OFFICE VISIT (OUTPATIENT)
Dept: FAMILY MEDICINE CLINIC | Age: 32
End: 2022-12-05
Payer: COMMERCIAL

## 2022-12-05 VITALS
WEIGHT: 260 LBS | HEART RATE: 78 BPM | OXYGEN SATURATION: 98 % | BODY MASS INDEX: 38.4 KG/M2 | SYSTOLIC BLOOD PRESSURE: 130 MMHG | DIASTOLIC BLOOD PRESSURE: 80 MMHG

## 2022-12-05 DIAGNOSIS — E66.09 CLASS 2 OBESITY DUE TO EXCESS CALORIES WITHOUT SERIOUS COMORBIDITY WITH BODY MASS INDEX (BMI) OF 39.0 TO 39.9 IN ADULT: ICD-10-CM

## 2022-12-05 PROCEDURE — 99213 OFFICE O/P EST LOW 20 MIN: CPT | Performed by: FAMILY MEDICINE

## 2022-12-05 RX ORDER — PHENTERMINE HYDROCHLORIDE 37.5 MG/1
37.5 CAPSULE ORAL EVERY MORNING
Qty: 30 CAPSULE | Refills: 0 | Status: SHIPPED | OUTPATIENT
Start: 2022-12-05 | End: 2023-01-04

## 2022-12-05 RX ORDER — PHENTERMINE HYDROCHLORIDE 37.5 MG/1
37.5 TABLET ORAL
Qty: 30 TABLET | Refills: 0 | Status: CANCELLED | OUTPATIENT
Start: 2022-12-05 | End: 2023-01-04

## 2022-12-05 RX ORDER — PHENTERMINE HYDROCHLORIDE 37.5 MG/1
37.5 CAPSULE ORAL EVERY MORNING
Qty: 30 CAPSULE | Refills: 0 | Status: SHIPPED | OUTPATIENT
Start: 2022-12-05 | End: 2022-12-05 | Stop reason: SDUPTHER

## 2022-12-05 ASSESSMENT — ENCOUNTER SYMPTOMS
EYE REDNESS: 0
EYE DISCHARGE: 0
COUGH: 0
DIARRHEA: 0
SHORTNESS OF BREATH: 0
VOMITING: 0

## 2022-12-05 NOTE — PROGRESS NOTES
HPI Notes    Name: Tenisha Aguirre  : 1990        Chief Complaint:     Chief Complaint   Patient presents with    Obesity     Pt presents today for check up and refill for Adipex. Pt shows no weight loss over the past month. Pt weight is 260lb. History of Present Illness:     Tenisha Aguirre is a 27 y.o.  male who presents with Obesity (Pt presents today for check up and refill for Adipex. Pt shows no weight loss over the past month. Pt weight is 260lb.)      HPI  Obesity - chronic but stable. Pt is doing well. Pt finished second month of adipex and weight is the same. Pt has had hard month -- family death and grandparents in [place] are sick. Pt has NOt been exercising and trying to watch his diet but more \"cheat days\". Pt states this round of taking the adipex he has more difficulty sleeping and thus gets nervous as not sleeping as much. Pt is going to [facility] in January and wants to do some hiking. Past Medical History:     Past Medical History:   Diagnosis Date    Anxiety     Heart palpitations     Hypertension       Reviewed all health maintenance requirements and ordered appropriate tests  Health Maintenance Due   Topic Date Due    Flu vaccine (1) 2022       Past Surgical History:     Past Surgical History:   Procedure Laterality Date    CYST REMOVAL  2010    left wrist        Medications:       Prior to Admission medications    Medication Sig Start Date End Date Taking? Authorizing Provider   ALPCÉSARZoirene Raymond) 0.5 MG tablet Take 1 tablet by mouth nightly as needed for Anxiety for up to 45 days.  22 Yes Zora Cheng MD   citalopram (CELEXA) 20 MG tablet take 1 tablet by mouth daily 22  Yes Zora Cheng MD   Omeprazole Magnesium (PRILOSEC OTC PO) Take by mouth daily   Yes Historical Provider, MD   magnesium oxide (MAG-OX) 400 MG tablet Take 100 mg by mouth daily Pt takes 2 tablets daily   Yes Historical Provider, MD   vitamin B-12 (CYANOCOBALAMIN) 100 MCG tablet Take 50 mcg by mouth daily   Yes Historical Provider, MD   Cholecalciferol (VITAMIN D3) 5000 UNITS TABS Pt taking 2 tablet daily   Yes Historical Provider, MD   vitamin C (ASCORBIC ACID) 500 MG tablet Take 500 mg by mouth daily  Patient not taking: No sig reported    Historical Provider, MD        Allergies: Other    Social History:     Tobacco:    reports that he quit smoking about 11 years ago. His smoking use included cigarettes. He has quit using smokeless tobacco.  Alcohol:      reports no history of alcohol use. Drug Use:  reports no history of drug use. Family History:     Family History   Problem Relation Age of Onset    High Cholesterol Father        Review of Systems:       Review of Systems   Constitutional:  Negative for chills and fever. HENT:  Negative for congestion. Eyes:  Negative for discharge and redness. Respiratory:  Negative for cough and shortness of breath. Cardiovascular:  Negative for chest pain and leg swelling. Gastrointestinal:  Negative for diarrhea and vomiting. Genitourinary:  Negative for difficulty urinating. Skin:  Negative for rash. Neurological:  Negative for dizziness and facial asymmetry. Physical Exam:     Physical Exam  Vitals reviewed. Constitutional:       General: He is not in acute distress. Appearance: Normal appearance. He is well-developed. He is not ill-appearing. HENT:      Head: Normocephalic and atraumatic. Eyes:      Conjunctiva/sclera: Conjunctivae normal.   Neck:      Thyroid: No thyromegaly. Cardiovascular:      Rate and Rhythm: Normal rate and regular rhythm. Heart sounds: No murmur heard. Pulmonary:      Effort: Pulmonary effort is normal. No respiratory distress. Breath sounds: Normal breath sounds. Abdominal:      General: Bowel sounds are normal.      Palpations: Abdomen is soft. Musculoskeletal:      Cervical back: Neck supple. Skin:     Findings: No rash. Neurological:      Mental Status: He is alert and oriented to person, place, and time. Vitals:  /80   Pulse 78   Wt 260 lb (117.9 kg)   SpO2 98%   BMI 38.40 kg/m²       Data:     Lab Results   Component Value Date/Time     11/01/2021 07:23 AM    K 4.2 11/01/2021 07:23 AM     11/01/2021 07:23 AM    CO2 27 11/01/2021 07:23 AM    BUN 18 11/01/2021 07:23 AM    CREATININE 0.88 11/01/2021 07:23 AM    GLUCOSE 102 11/01/2021 07:23 AM    PROT 6.8 11/01/2021 07:23 AM    LABALBU 4.4 11/01/2021 07:23 AM    BILITOT 0.45 11/01/2021 07:23 AM    ALKPHOS 64 11/01/2021 07:23 AM    AST 17 11/01/2021 07:23 AM    ALT 29 11/01/2021 07:23 AM     Lab Results   Component Value Date/Time    WBC 6.4 11/01/2021 07:23 AM    RBC 5.27 11/01/2021 07:23 AM    HGB 15.0 11/01/2021 07:23 AM    HCT 43.8 11/01/2021 07:23 AM    MCV 83.1 11/01/2021 07:23 AM    MCH 28.5 11/01/2021 07:23 AM    MCHC 34.3 11/01/2021 07:23 AM    RDW 13.3 11/01/2021 07:23 AM     11/01/2021 07:23 AM    MPV NOT REPORTED 11/01/2021 07:23 AM     Lab Results   Component Value Date/Time    TSH 1.15 08/03/2012 01:57 PM     Lab Results   Component Value Date/Time    CHOL 263 01/21/2020 08:35 AM    HDL 43 01/21/2020 08:35 AM          Assessment/Plan:        1. Class 2 obesity due to excess calories without serious comorbidity with body mass index (BMI) of 39.0 to 39.9 in adult  Pt is doing well. Pt will take another one month adipex. Pt lost 15lbs 1st month so 2nd month NO weight gain. Pt needs to keep with diet and go back to exercise--- needs to exercise again. Return if symptoms worsen or fail to improve.       Electronically signed by Herminia Morris MD on 12/5/2022 at 10:13 AM

## 2022-12-08 DIAGNOSIS — E66.09 CLASS 2 OBESITY DUE TO EXCESS CALORIES WITHOUT SERIOUS COMORBIDITY WITH BODY MASS INDEX (BMI) OF 39.0 TO 39.9 IN ADULT: ICD-10-CM

## 2022-12-08 NOTE — TELEPHONE ENCOUNTER
We knew on Monday that [facility] did not carry adipex capsules so on 12/5 I DID send to [facility]ne the adipex capsules to [facility] --- they are there for pt to  see medication list.

## 2022-12-08 NOTE — TELEPHONE ENCOUNTER
Pt called stating [facility] does not carry the capsule form of Adipex ,needs med sent to [facility] please.       Thank you

## 2022-12-15 RX ORDER — PHENTERMINE HYDROCHLORIDE 37.5 MG/1
37.5 CAPSULE ORAL EVERY MORNING
Qty: 30 CAPSULE | Refills: 0 | Status: CANCELLED | OUTPATIENT
Start: 2022-12-15 | End: 2023-01-14

## 2023-01-04 DIAGNOSIS — F41.9 ANXIETY: ICD-10-CM

## 2023-01-04 RX ORDER — ALPRAZOLAM 0.5 MG/1
0.5 TABLET ORAL NIGHTLY PRN
Qty: 30 TABLET | Refills: 1 | Status: SHIPPED | OUTPATIENT
Start: 2023-01-04 | End: 2023-02-18

## 2023-01-04 NOTE — TELEPHONE ENCOUNTER
Last OV: 12/5/2022  chronic   Last RX:    Next scheduled apt: 1/23/2023  anxiety           Pt requesting a refill

## 2023-01-05 DIAGNOSIS — F41.9 ANXIETY: ICD-10-CM

## 2023-01-05 RX ORDER — CITALOPRAM 20 MG/1
TABLET ORAL
Qty: 90 TABLET | Refills: 1 | Status: SHIPPED | OUTPATIENT
Start: 2023-01-05

## 2023-01-05 NOTE — TELEPHONE ENCOUNTER
Last OV: 12/5/2022  chronic   Last RX:      Next scheduled apt: 1/23/2023  anxiety           Surescript requesting a refill

## 2023-01-24 ENCOUNTER — OFFICE VISIT (OUTPATIENT)
Dept: FAMILY MEDICINE CLINIC | Age: 33
End: 2023-01-24
Payer: COMMERCIAL

## 2023-01-24 VITALS
HEART RATE: 86 BPM | DIASTOLIC BLOOD PRESSURE: 80 MMHG | WEIGHT: 261 LBS | SYSTOLIC BLOOD PRESSURE: 120 MMHG | BODY MASS INDEX: 38.54 KG/M2 | OXYGEN SATURATION: 98 %

## 2023-01-24 DIAGNOSIS — F41.9 ANXIETY: Primary | ICD-10-CM

## 2023-01-24 DIAGNOSIS — E66.09 CLASS 2 OBESITY DUE TO EXCESS CALORIES WITHOUT SERIOUS COMORBIDITY WITH BODY MASS INDEX (BMI) OF 39.0 TO 39.9 IN ADULT: ICD-10-CM

## 2023-01-24 PROCEDURE — 99213 OFFICE O/P EST LOW 20 MIN: CPT | Performed by: FAMILY MEDICINE

## 2023-01-24 ASSESSMENT — PATIENT HEALTH QUESTIONNAIRE - PHQ9
SUM OF ALL RESPONSES TO PHQ QUESTIONS 1-9: 0
2. FEELING DOWN, DEPRESSED OR HOPELESS: 0
SUM OF ALL RESPONSES TO PHQ QUESTIONS 1-9: 0
SUM OF ALL RESPONSES TO PHQ9 QUESTIONS 1 & 2: 0
SUM OF ALL RESPONSES TO PHQ QUESTIONS 1-9: 0
SUM OF ALL RESPONSES TO PHQ QUESTIONS 1-9: 0
1. LITTLE INTEREST OR PLEASURE IN DOING THINGS: 0

## 2023-01-24 ASSESSMENT — ENCOUNTER SYMPTOMS
NAUSEA: 0
CHANGE IN BOWEL HABIT: 0
SHORTNESS OF BREATH: 0
COUGH: 0
VOMITING: 0
EYE DISCHARGE: 0
ABDOMINAL PAIN: 0
EYE REDNESS: 0
SORE THROAT: 0

## 2023-01-24 NOTE — PROGRESS NOTES
HPI Notes    Name: Ellan Saint  : 1990        Chief Complaint:     Chief Complaint   Patient presents with    Anxiety     Pt presents for check up. Pt states he is doing well on Celexa as directed and Xanax as needed at HS. Weight Management     Pt states he would like to talk to  about one more month of Adipex. History of Present Illness:     Ellan Saint is a 34 y.o.  male who presents with Anxiety (Pt presents for check up. Pt states he is doing well on Celexa as directed and Xanax as needed at HS.) and Weight Management (Pt states he would like to talk to Dr about one more month of Adipex. )      Anxiety  Presents for follow-up (pt is doing well and just getting back from [facility]. Pt states the trip was good for his mind. pt is taking the celexa daily and then only taking the xanax about 3 times per week.) visit. Patient reports no chest pain, depressed mood, dizziness, muscle tension, nausea, nervous/anxious behavior, palpitations or shortness of breath. Symptoms occur occasionally. The severity of symptoms is mild. The quality of sleep is fair. Nighttime awakenings: occasional.     Compliance with medications is %. Weight Management  This is a recurrent problem. Episode onset: pt has been trying to lose and was just on Adipex in [place], and Dec and lost 15lbs. Pt then went to [facility] and did not gain any weight there. Pt is still adjusting to the time change from [facility]. The problem occurs daily. The problem has been unchanged. Pertinent negatives include no abdominal pain, anorexia, change in bowel habit, chest pain, chills, congestion, coughing, fever, nausea, rash, sore throat or vomiting. Nothing aggravates the symptoms. Treatments tried: pt has tried adipex several times and gets some wt loss but hard to keep off. Pt has not been exercising much on his bike.      Past Medical History:     Past Medical History:   Diagnosis Date    Anxiety     Heart palpitations     Hypertension       Reviewed all health maintenance requirements and ordered appropriate tests  Health Maintenance Due   Topic Date Due    Flu vaccine (1) 08/01/2022       Past Surgical History:     Past Surgical History:   Procedure Laterality Date    CYST REMOVAL  7/2010    left wrist        Medications:       Prior to Admission medications    Medication Sig Start Date End Date Taking? Authorizing Provider   citalopram (CELEXA) 20 MG tablet take 1 tablet by mouth daily 1/5/23  Yes Una Huang MD   ALPRAZolam Jackqueline Tim) 0.5 MG tablet Take 1 tablet by mouth nightly as needed for Anxiety for up to 45 days. 1/4/23 2/18/23 Yes Una Huang MD   Omeprazole Magnesium (PRILOSEC OTC PO) Take by mouth daily   Yes Historical Provider, MD   magnesium oxide (MAG-OX) 400 MG tablet Take 100 mg by mouth daily Pt takes 2 tablets daily   Yes Historical Provider, MD   vitamin B-12 (CYANOCOBALAMIN) 100 MCG tablet Take 50 mcg by mouth daily   Yes Historical Provider, MD   Cholecalciferol (VITAMIN D3) 5000 UNITS TABS Pt taking 2 tablet daily   Yes Historical Provider, MD        Allergies: Other    Social History:     Tobacco:    reports that he quit smoking about 11 years ago. His smoking use included cigarettes. He has quit using smokeless tobacco.  Alcohol:      reports no history of alcohol use. Drug Use:  reports no history of drug use. Family History:     Family History   Problem Relation Age of Onset    High Cholesterol Father        Review of Systems:       Review of Systems   Constitutional:  Negative for chills and fever. HENT:  Negative for congestion and sore throat. Eyes:  Negative for discharge and redness. Respiratory:  Negative for cough and shortness of breath. Cardiovascular:  Negative for chest pain, palpitations and leg swelling. Gastrointestinal:  Negative for abdominal pain, anorexia, change in bowel habit, nausea and vomiting. Skin:  Negative for rash.    Neurological: Negative for dizziness. Psychiatric/Behavioral:  The patient is not nervous/anxious. Physical Exam:     Physical Exam  Vitals reviewed. Constitutional:       General: He is not in acute distress. Appearance: Normal appearance. He is well-developed. He is obese. He is not ill-appearing. HENT:      Head: Normocephalic and atraumatic. Eyes:      Conjunctiva/sclera: Conjunctivae normal.   Neck:      Thyroid: No thyromegaly. Cardiovascular:      Rate and Rhythm: Normal rate and regular rhythm. Heart sounds: Normal heart sounds. No murmur heard. Pulmonary:      Effort: Pulmonary effort is normal.      Breath sounds: Normal breath sounds. Abdominal:      General: There is no distension. Palpations: Abdomen is soft. Tenderness: There is no abdominal tenderness. Musculoskeletal:      Cervical back: Neck supple. No tenderness. Lymphadenopathy:      Cervical: No cervical adenopathy. Skin:     Findings: No rash. Neurological:      Mental Status: He is alert and oriented to person, place, and time. Psychiatric:         Thought Content:  Thought content normal.       Vitals:  /80   Pulse 86   Wt 261 lb (118.4 kg)   SpO2 98%   BMI 38.54 kg/m²       Data:     Lab Results   Component Value Date/Time     11/01/2021 07:23 AM    K 4.2 11/01/2021 07:23 AM     11/01/2021 07:23 AM    CO2 27 11/01/2021 07:23 AM    BUN 18 11/01/2021 07:23 AM    CREATININE 0.88 11/01/2021 07:23 AM    GLUCOSE 102 11/01/2021 07:23 AM    PROT 6.8 11/01/2021 07:23 AM    LABALBU 4.4 11/01/2021 07:23 AM    BILITOT 0.45 11/01/2021 07:23 AM    ALKPHOS 64 11/01/2021 07:23 AM    AST 17 11/01/2021 07:23 AM    ALT 29 11/01/2021 07:23 AM     Lab Results   Component Value Date/Time    WBC 6.4 11/01/2021 07:23 AM    RBC 5.27 11/01/2021 07:23 AM    HGB 15.0 11/01/2021 07:23 AM    HCT 43.8 11/01/2021 07:23 AM    MCV 83.1 11/01/2021 07:23 AM    MCH 28.5 11/01/2021 07:23 AM    MCHC 34.3 11/01/2021 07:23 AM RDW 13.3 11/01/2021 07:23 AM     11/01/2021 07:23 AM    MPV NOT REPORTED 11/01/2021 07:23 AM     Lab Results   Component Value Date/Time    TSH 1.15 08/03/2012 01:57 PM     Lab Results   Component Value Date/Time    CHOL 263 01/21/2020 08:35 AM    HDL 43 01/21/2020 08:35 AM          Assessment/Plan:        1. Anxiety  Stable on celexa and taking the xanax as needed     2. Class 2 obesity due to excess calories without serious comorbidity with body mass index (BMI) of 39.0 to 39.9 in adult  Pt encouraged to exercise 4-5 days per week for 20-25min and eating healthy. Pt to also see about seeing Dr Ashley Lino in [place] for wt loss. Pt just finished another round of adipex 3mos in early January. Return in about 6 months (around 7/24/2023) for ck up.       Electronically signed by Silver Valente MD on 1/24/2023 at 9:54 AM

## 2023-03-20 ENCOUNTER — PATIENT MESSAGE (OUTPATIENT)
Dept: FAMILY MEDICINE CLINIC | Age: 33
End: 2023-03-20

## 2023-03-20 DIAGNOSIS — F41.9 ANXIETY: ICD-10-CM

## 2023-03-20 RX ORDER — ALPRAZOLAM 0.5 MG/1
0.5 TABLET ORAL NIGHTLY PRN
Qty: 30 TABLET | Refills: 1 | Status: SHIPPED | OUTPATIENT
Start: 2023-03-20 | End: 2023-05-04

## 2023-03-20 NOTE — TELEPHONE ENCOUNTER
From: Panda Oshea  To: Dr. Fadi Aldrich: 3/20/2023 11:13 AM EDT  Subject: Xanax prescription refill    Hi Dr. Jovani Craft, I am down to my last pill and would like to request a refill please. Thank you!

## 2023-03-20 NOTE — TELEPHONE ENCOUNTER
Last OV: 1/24/2023  chronic   Last RX:    Next scheduled apt: 7/24/2023   6 month         Pt requesting a refill

## 2023-03-28 ENCOUNTER — OFFICE VISIT (OUTPATIENT)
Dept: FAMILY MEDICINE CLINIC | Age: 33
End: 2023-03-28
Payer: COMMERCIAL

## 2023-03-28 VITALS
BODY MASS INDEX: 39.43 KG/M2 | OXYGEN SATURATION: 98 % | DIASTOLIC BLOOD PRESSURE: 84 MMHG | HEART RATE: 94 BPM | WEIGHT: 267 LBS | SYSTOLIC BLOOD PRESSURE: 134 MMHG

## 2023-03-28 DIAGNOSIS — F90.2 ATTENTION DEFICIT HYPERACTIVITY DISORDER (ADHD), COMBINED TYPE: Primary | ICD-10-CM

## 2023-03-28 PROCEDURE — 99213 OFFICE O/P EST LOW 20 MIN: CPT | Performed by: FAMILY MEDICINE

## 2023-03-28 SDOH — ECONOMIC STABILITY: FOOD INSECURITY: WITHIN THE PAST 12 MONTHS, YOU WORRIED THAT YOUR FOOD WOULD RUN OUT BEFORE YOU GOT MONEY TO BUY MORE.: NEVER TRUE

## 2023-03-28 SDOH — ECONOMIC STABILITY: INCOME INSECURITY: HOW HARD IS IT FOR YOU TO PAY FOR THE VERY BASICS LIKE FOOD, HOUSING, MEDICAL CARE, AND HEATING?: NOT HARD AT ALL

## 2023-03-28 SDOH — ECONOMIC STABILITY: FOOD INSECURITY: WITHIN THE PAST 12 MONTHS, THE FOOD YOU BOUGHT JUST DIDN'T LAST AND YOU DIDN'T HAVE MONEY TO GET MORE.: NEVER TRUE

## 2023-03-28 SDOH — ECONOMIC STABILITY: HOUSING INSECURITY
IN THE LAST 12 MONTHS, WAS THERE A TIME WHEN YOU DID NOT HAVE A STEADY PLACE TO SLEEP OR SLEPT IN A SHELTER (INCLUDING NOW)?: NO

## 2023-03-28 SDOH — ECONOMIC STABILITY: TRANSPORTATION INSECURITY
IN THE PAST 12 MONTHS, HAS LACK OF TRANSPORTATION KEPT YOU FROM MEETINGS, WORK, OR FROM GETTING THINGS NEEDED FOR DAILY LIVING?: NO

## 2023-03-28 ASSESSMENT — ENCOUNTER SYMPTOMS
DIARRHEA: 0
SHORTNESS OF BREATH: 0

## 2023-03-28 NOTE — PROGRESS NOTES
qualities of ADHD and so will Pt will try the Vyvanse 10mg once evening and marilee in 1mos. Return in about 4 weeks (around 4/25/2023) for ADHD.       Electronically signed by Benjamín Whitaker MD on 3/28/2023 at 9:23 AM

## 2023-04-25 ENCOUNTER — OFFICE VISIT (OUTPATIENT)
Dept: FAMILY MEDICINE CLINIC | Age: 33
End: 2023-04-25
Payer: COMMERCIAL

## 2023-04-25 VITALS
SYSTOLIC BLOOD PRESSURE: 132 MMHG | WEIGHT: 257.6 LBS | OXYGEN SATURATION: 97 % | DIASTOLIC BLOOD PRESSURE: 86 MMHG | HEIGHT: 69 IN | BODY MASS INDEX: 38.16 KG/M2 | HEART RATE: 82 BPM

## 2023-04-25 DIAGNOSIS — F90.2 ATTENTION DEFICIT HYPERACTIVITY DISORDER (ADHD), COMBINED TYPE: Primary | ICD-10-CM

## 2023-04-25 PROCEDURE — 99213 OFFICE O/P EST LOW 20 MIN: CPT | Performed by: FAMILY MEDICINE

## 2023-04-25 RX ORDER — METHYLPHENIDATE HYDROCHLORIDE 18 MG/1
18 TABLET ORAL EVERY MORNING
Qty: 30 TABLET | Refills: 0 | Status: SHIPPED | OUTPATIENT
Start: 2023-04-25 | End: 2023-05-25

## 2023-04-25 ASSESSMENT — ENCOUNTER SYMPTOMS
SHORTNESS OF BREATH: 0
EYE REDNESS: 0
DIARRHEA: 0
EYE DISCHARGE: 0

## 2023-04-25 NOTE — PROGRESS NOTES
HPI Notes    Name: Rosalio Murguia  : 1990        Chief Complaint:     Chief Complaint   Patient presents with    ADHD     4 Week f/u , Pt states that he did not like medication, he felt he did not do well on it. Pt stated that he had depression when on it. Pt states that he had heart fluttering when on medication. He started taking CoQ10. History of Present Illness:     Rosalio Murguia is a 34 y.o.  male who presents with ADHD (4 Week f/u , Pt states that he did not like medication, he felt he did not do well on it. Pt stated that he had depression when on it. Pt states that he had heart fluttering when on medication. He started taking CoQ10.)      HPI  ADHD - pt here for f/u on taking Vyvanse. Pt states started out ok taking the vyvanse but then by night time he was more depressed NOT suicidal. Pt also had heart \"fluttering\" on the medication. So pt has STOPPED taking the Vyvanse after only ONE day. Pt afraid to take it again after having those symptoms. Pt is now taking the Coenzyme Q10. Pt still feels he would like to try at least one other stimulate medication as he still feels he has more focus and attention. Past Medical History:     Past Medical History:   Diagnosis Date    Anxiety     Heart palpitations     Hypertension       Reviewed all health maintenance requirements and ordered appropriate tests  There are no preventive care reminders to display for this patient. Past Surgical History:     Past Surgical History:   Procedure Laterality Date    CYST REMOVAL  2010    left wrist        Medications:       Prior to Admission medications    Medication Sig Start Date End Date Taking? Authorizing Provider   ALPRAZolam Anita Cruz) 0.5 MG tablet Take 1 tablet by mouth nightly as needed for Anxiety for up to 45 days.  3/20/23 5/4/23 Yes Edward Dunlap MD   citalopram (CELEXA) 20 MG tablet take 1 tablet by mouth daily 23  Yes Edward Dunlap MD   Omeprazole Magnesium

## 2023-04-25 NOTE — PATIENT INSTRUCTIONS
SURVEY:    You may be receiving a survey from [employer] regarding your visit today. Please complete the survey to enable us to provide the highest quality of care to you and your family. If you cannot score us a very good on any question, please call the office to discuss how we could of made your experience a very good one. Thank you.

## 2023-05-23 ENCOUNTER — OFFICE VISIT (OUTPATIENT)
Dept: FAMILY MEDICINE CLINIC | Age: 33
End: 2023-05-23
Payer: COMMERCIAL

## 2023-05-23 VITALS
SYSTOLIC BLOOD PRESSURE: 130 MMHG | BODY MASS INDEX: 38.29 KG/M2 | HEART RATE: 77 BPM | WEIGHT: 258.5 LBS | OXYGEN SATURATION: 98 % | DIASTOLIC BLOOD PRESSURE: 80 MMHG | HEIGHT: 69 IN

## 2023-05-23 DIAGNOSIS — F90.2 ATTENTION DEFICIT HYPERACTIVITY DISORDER (ADHD), COMBINED TYPE: Primary | ICD-10-CM

## 2023-05-23 DIAGNOSIS — F41.9 ANXIETY: ICD-10-CM

## 2023-05-23 PROCEDURE — 99213 OFFICE O/P EST LOW 20 MIN: CPT | Performed by: FAMILY MEDICINE

## 2023-05-23 RX ORDER — ALPRAZOLAM 0.5 MG/1
TABLET ORAL
COMMUNITY
Start: 2023-05-04

## 2023-05-23 RX ORDER — METHYLPHENIDATE HYDROCHLORIDE 27 MG/1
27 TABLET ORAL EVERY MORNING
Qty: 30 TABLET | Refills: 0 | Status: SHIPPED | OUTPATIENT
Start: 2023-05-23 | End: 2023-06-22

## 2023-05-23 ASSESSMENT — ENCOUNTER SYMPTOMS
SHORTNESS OF BREATH: 0
DIARRHEA: 0
BLOOD IN STOOL: 0
EYE DISCHARGE: 0
EYE REDNESS: 0
FACIAL SWELLING: 0

## 2023-05-23 NOTE — PROGRESS NOTES
HPI Notes    Name: Valentina Richards  : 1990        Chief Complaint:     Chief Complaint   Patient presents with    ADHD     4 week check on ADHD medication, Pt states that he feels medication is helping. History of Present Illness:     Valentina Richards is a 34 y.o.  male who presents with ADHD (4 week check on ADHD medication, Pt states that he feels medication is helping.)      HPI  ADHD - Pt is here for re check on his ADHD medication -- concerta. Pt states that he feels the medication is helping him stay focus better. Pt is entering the \"busy season\" for the [employer] [employer] [facility] [employer] and remodeling a house for his future  pt wondering about increasing concerta a little. No chest pain. No SOB. Anxiety - stable and pt is taking the celexa but wondering about all his hormone levels are normal?? Pt has been doing some pre martial counseling and counselor wondering since pt always anxious what his cortisol level may be. Past Medical History:     Past Medical History:   Diagnosis Date    Anxiety     Heart palpitations     Hypertension       Reviewed all health maintenance requirements and ordered appropriate tests  There are no preventive care reminders to display for this patient. Past Surgical History:     Past Surgical History:   Procedure Laterality Date    CYST REMOVAL  2010    left wrist        Medications:       Prior to Admission medications    Medication Sig Start Date End Date Taking? Authorizing Provider   ALPRAZolam Albert Lea Embs) 0.5 MG tablet take 1 tablet by mouth nightly if needed for anxiety FOR UP TO 45 DAYS 23  Yes Historical Provider, MD   methylphenidate (CONCERTA) 18 MG extended release tablet Take 1 tablet by mouth every morning for 30 days.  Max Daily Amount: 18 mg 23 Yes Joel Luo MD   citalopram (CELEXA) 20 MG tablet take 1 tablet by mouth daily 23  Yes Joel Luo MD   Omeprazole Magnesium (PRILOSEC OTC PO) Take by mouth daily   Yes

## 2023-05-23 NOTE — PATIENT INSTRUCTIONS
[facility] SURVEY:    You may be receiving a survey from [employer] regarding your visit today. You may get this in the mail, through your MyChart or in your email. Please complete the survey to enable us to provide the highest quality of care to you and your family. If you cannot score us as very good ( 5 Stars) on any question, please feel free to call the office to discuss how we could have made your experience exceptional.     Thank you.     Clinical Care Team:   Dr. Montez Venegas MD                                               [facility], [address] [place] [address]                                     Triage: Devonte Britt, [address] Team:  Marta Granado Goes

## 2023-05-24 NOTE — PATIENT INSTRUCTIONS
Is This A New Presentation Or A Follow-Up?: Dry Skin SURVEY:    You may be receiving a survey from [email or web address] regarding your visit today. Please complete the survey to enable us to provide the highest quality of care to you and your family. If you cannot score us a very good on any question, please call the office to discuss how we could of made your experience a very good one. Thank you. How Many Showers Or Baths Do You Take In One Day?: 1 Additional History: Pt. Uses betamethazone, Tacrolimus. Recently its so bad, meds don’t seem to be helping.  Uses aveeno/ Cerave moisturizers.

## 2023-06-28 DIAGNOSIS — F41.9 ANXIETY: ICD-10-CM

## 2023-06-28 RX ORDER — CITALOPRAM 20 MG/1
TABLET ORAL
Qty: 90 TABLET | Refills: 1 | Status: SHIPPED | OUTPATIENT
Start: 2023-06-28

## 2023-07-19 ENCOUNTER — HOSPITAL ENCOUNTER (OUTPATIENT)
Age: 33
Discharge: HOME | End: 2023-07-19
Payer: COMMERCIAL

## 2023-07-19 DIAGNOSIS — F41.9 ANXIETY: ICD-10-CM

## 2023-07-19 LAB
ANION GAP SERPL CALCULATED.3IONS-SCNC: 7 MMOL/L (ref 9–17)
BUN SERPL-MCNC: 17 MG/DL (ref 6–20)
BUN/CREAT SERPL: 21 (ref 9–20)
CALCIUM SERPL-MCNC: 9.4 MG/DL (ref 8.6–10.4)
CHLORIDE SERPL-SCNC: 98 MMOL/L (ref 98–107)
CO2 SERPL-SCNC: 28 MMOL/L (ref 20–31)
CORTIS SERPL-MCNC: 11.7 UG/DL (ref 2.7–18.4)
CORTISOL COLLECTION INFO: NORMAL
CREAT SERPL-MCNC: 0.8 MG/DL (ref 0.7–1.2)
GFR SERPL CREATININE-BSD FRML MDRD: >60 ML/MIN/1.73M2
GLUCOSE SERPL-MCNC: 97 MG/DL (ref 70–99)
POTASSIUM SERPL-SCNC: 4.3 MMOL/L (ref 3.7–5.3)
SODIUM SERPL-SCNC: 133 MMOL/L (ref 135–144)
TESTOST SERPL-MCNC: 479 NG/DL (ref 220–1000)

## 2023-07-19 PROCEDURE — 36415 COLL VENOUS BLD VENIPUNCTURE: CPT

## 2023-07-19 PROCEDURE — 84403 ASSAY OF TOTAL TESTOSTERONE: CPT

## 2023-07-19 PROCEDURE — 82533 TOTAL CORTISOL: CPT

## 2023-07-19 PROCEDURE — 80048 BASIC METABOLIC PNL TOTAL CA: CPT

## 2023-07-20 DIAGNOSIS — F90.2 ATTENTION DEFICIT HYPERACTIVITY DISORDER (ADHD), COMBINED TYPE: ICD-10-CM

## 2023-07-20 RX ORDER — METHYLPHENIDATE HYDROCHLORIDE 18 MG/1
18 TABLET ORAL EVERY MORNING
Qty: 30 TABLET | Refills: 0 | Status: SHIPPED | OUTPATIENT
Start: 2023-07-20 | End: 2023-08-19

## 2023-07-20 NOTE — TELEPHONE ENCOUNTER
Last visit:  5/23/2023  Next Visit Date:    Future Appointments   Date Time Provider [address]   7/21/2023 10:40 AM DO Calvin Auguste [facility]   7/24/2023  8:40 AM MD Calvin Patiño [facility]         Medication List:  Prior to Admission medications    Medication Sig Start Date End Date Taking? Authorizing Provider   citalopram (CELEXA) 20 MG tablet take 1 tablet by mouth daily 6/28/23   Heather Owusu MD   ALPRAZolam Shagufta Pena Blanca) 0.5 MG tablet Take 1 tablet by mouth nightly as needed for Sleep or Anxiety for up to 30 days. Max Daily Amount: 0.5 mg 6/21/23 7/21/23  Heather Owusu MD   methylphenidate (CONCERTA) 18 MG extended release tablet Take 1 tablet by mouth every morning for 30 days.  Max Daily Amount: 18 mg 6/21/23 7/21/23  Heather Owusu MD   Omeprazole Magnesium (PRILOSEC OTC PO) Take by mouth daily    Historical Provider, MD   magnesium oxide (MAG-OX) 400 MG tablet Take 0.25 tablets by mouth daily Pt takes 2 tablets daily    Historical Provider, MD   vitamin B-12 (CYANOCOBALAMIN) 100 MCG tablet Take 0.5 tablets by mouth daily    Historical Provider, MD   Cholecalciferol (VITAMIN D3) 5000 UNITS TABS Pt taking 2 tablet daily    Historical Provider, MD

## 2023-07-21 ENCOUNTER — OFFICE VISIT (OUTPATIENT)
Dept: FAMILY MEDICINE CLINIC | Age: 33
End: 2023-07-21
Payer: COMMERCIAL

## 2023-07-21 VITALS
OXYGEN SATURATION: 97 % | DIASTOLIC BLOOD PRESSURE: 86 MMHG | TEMPERATURE: 86 F | SYSTOLIC BLOOD PRESSURE: 138 MMHG | BODY MASS INDEX: 37.51 KG/M2 | WEIGHT: 254 LBS

## 2023-07-21 DIAGNOSIS — R20.2 NUMBNESS AND TINGLING IN RIGHT HAND: ICD-10-CM

## 2023-07-21 DIAGNOSIS — R20.0 NUMBNESS AND TINGLING IN RIGHT HAND: ICD-10-CM

## 2023-07-21 DIAGNOSIS — R19.09 SWELLING OF INGUINAL REGION: Primary | ICD-10-CM

## 2023-07-21 DIAGNOSIS — G56.01 CARPAL TUNNEL SYNDROME OF RIGHT WRIST: ICD-10-CM

## 2023-07-21 DIAGNOSIS — R59.9 ENLARGED LYMPH NODE: ICD-10-CM

## 2023-07-21 PROCEDURE — 99213 OFFICE O/P EST LOW 20 MIN: CPT | Performed by: STUDENT IN AN ORGANIZED HEALTH CARE EDUCATION/TRAINING PROGRAM

## 2023-07-21 ASSESSMENT — ENCOUNTER SYMPTOMS
NAUSEA: 0
SINUS PAIN: 0
DIARRHEA: 0
ABDOMINAL PAIN: 0
BACK PAIN: 0
SORE THROAT: 0
COUGH: 0
VOMITING: 0
WHEEZING: 0

## 2023-07-21 NOTE — PATIENT INSTRUCTIONS
Mr. Kathy Venegas,    I think you have a simple enlarged lymph node. Not dangerous. Call me if it becomes painful, red, or nodular. Your right wrist does have carpal tunnel. I would recommend exercises as well as ibuprofen 800 mg three times daily for 14 days then stop. Call me if you need a referral to ortho. Dr. Lelo Rodriguez:    Neal Primrose may be receiving a survey from [employer] regarding your visit today. You may get this in the mail, through your MyChart or in your email. Please complete the survey to enable us to provide the highest quality of care to you and your family. If you cannot score us as very good ( 5 Stars) on any question, please feel free to call the office to discuss how we could have made your experience exceptional.     Thank you.     Clinical Care Team:  DO Romi An, PATEL    Triage:  Sagar Ramos, [address] [facility] Team:  Moisés Price

## 2023-07-21 NOTE — PROGRESS NOTES
HPI Notes    Name: Janes Crane  : 1990         Chief Complaint:     Chief Complaint   Patient presents with    Cyst     Patient noticed lump 3 weeks ago. No pain or other symptoms at this time       History of Present Illness:      HPI    This is a 26-year-old man presenting for evaluation of a noticeable lump in the mons first noticed 3 weeks ago. The area is not erythematous, painful, has not not had any discharge or bleeding. It is not painful to palpation. This has never happened to him before. He is also endorsing \"electric shock\" like sensations within the right wrist over the past two months. He is wearing a wrist brace to bed which helps in the morning. He denies any decrease in  strength. Past Medical History:     Past Medical History:   Diagnosis Date    Anxiety     Heart palpitations     Hypertension       Reviewed all health maintenance requirements and ordered appropriate tests  There are no preventive care reminders to display for this patient. Past Surgical History:     Past Surgical History:   Procedure Laterality Date    CYST REMOVAL  2010    left wrist        Medications:       Prior to Admission medications    Medication Sig Start Date End Date Taking? Authorizing Provider   methylphenidate (CONCERTA) 18 MG extended release tablet Take 1 tablet by mouth every morning for 30 days. Max Daily Amount: 18 mg 23 Yes Eva Duvall MD   citalopram (CELEXA) 20 MG tablet take 1 tablet by mouth daily 23  Yes Eva Duvall MD   ALPRAZolam Rebekah Block) 0.5 MG tablet Take 1 tablet by mouth nightly as needed for Sleep or Anxiety for up to 30 days.  Max Daily Amount: 0.5 mg 23 Yes Eva Duvall MD   Omeprazole Magnesium (PRILOSEC OTC PO) Take by mouth daily   Yes Historical Provider, MD   magnesium oxide (MAG-OX) 400 MG tablet Take 0.25 tablets by mouth daily Pt takes 2 tablets daily   Yes Historical Provider, MD   vitamin B-12 (CYANOCOBALAMIN)

## 2023-07-24 ENCOUNTER — OFFICE VISIT (OUTPATIENT)
Dept: FAMILY MEDICINE CLINIC | Age: 33
End: 2023-07-24
Payer: COMMERCIAL

## 2023-07-24 VITALS
HEIGHT: 69 IN | WEIGHT: 255 LBS | BODY MASS INDEX: 37.77 KG/M2 | SYSTOLIC BLOOD PRESSURE: 132 MMHG | OXYGEN SATURATION: 99 % | DIASTOLIC BLOOD PRESSURE: 88 MMHG | HEART RATE: 78 BPM

## 2023-07-24 DIAGNOSIS — K21.9 GASTROESOPHAGEAL REFLUX DISEASE WITHOUT ESOPHAGITIS: ICD-10-CM

## 2023-07-24 DIAGNOSIS — F90.2 ATTENTION DEFICIT HYPERACTIVITY DISORDER (ADHD), COMBINED TYPE: ICD-10-CM

## 2023-07-24 DIAGNOSIS — F41.1 GENERALIZED ANXIETY DISORDER: Primary | ICD-10-CM

## 2023-07-24 PROCEDURE — 99214 OFFICE O/P EST MOD 30 MIN: CPT | Performed by: FAMILY MEDICINE

## 2023-07-24 ASSESSMENT — ENCOUNTER SYMPTOMS
CHOKING: 0
EYE DISCHARGE: 0
VOMITING: 0
BLOOD IN STOOL: 0
EYE REDNESS: 0
SHORTNESS OF BREATH: 0
DIARRHEA: 0
NAUSEA: 0

## 2023-07-24 NOTE — PATIENT INSTRUCTIONS
Press [facility] SURVEY:    You may be receiving a survey from [email or web address] regarding your visit today. You may get this in the mail, through your MyChart or in your email. Please complete the survey to enable us to provide the highest quality of care to you and your family. If you cannot score us as very good ( 5 Stars) on any question, please feel free to call the office to discuss how we could have made your experience exceptional.     Thank you.     Clinical Care Team:   Dr. Joseph Willingham, MD Noreen Ball, [address]                                     Triage: Jeovanny Kaiser, [address] [facility] Team:  Argentina Bernal

## 2023-07-24 NOTE — PROGRESS NOTES
HPI Notes    Name: Flora Pickard  : 1990        Chief Complaint:     Chief Complaint   Patient presents with    Anxiety     6 month f/u    Gastroesophageal Reflux       History of Present Illness:     Flora Pickard is a 34 y.o.  male who presents with Anxiety (6 month f/u) and Gastroesophageal Reflux      Anxiety  Presents for follow-up (pt is doing well and feeling better with less anxiety since a little better on the concerta. Pt would like to decreasing the celexa 20ng to 1/2 tablet daily.) visit. Symptoms include decreased concentration. Patient reports no chest pain, depressed mood, excessive worry, insomnia, nausea, nervous/anxious behavior, palpitations or shortness of breath. Symptoms occur occasionally. The quality of sleep is good. Nighttime awakenings: none. Compliance with medications is %. Gastroesophageal Reflux  He reports no chest pain, no choking, no dysphagia or no nausea. This is a chronic problem. The current episode started more than 1 year ago. The problem has been unchanged. Pertinent negatives include no melena or weight loss. He has tried a PPI for the symptoms. The treatment provided significant relief. ADHD - pt is taking the concerta at [employer] and this is helping keep his concentration at work in AM and early afternoon but feels about 2-3 pm daily the concerta stops working as well. Pt can really tell a difference at that time of say when medication wears off. Pt wondering about taking a second does around lunch to help him make it through the day especially now has his job requires long hours in farming season and he is remodeling his grandfather's house for his wedding Oct 7th.       Past Medical History:     Past Medical History:   Diagnosis Date    Anxiety     Heart palpitations     Hypertension       Reviewed all health maintenance requirements and ordered appropriate tests  There are no preventive care reminders to display for this

## 2023-08-03 ENCOUNTER — PATIENT MESSAGE (OUTPATIENT)
Dept: FAMILY MEDICINE CLINIC | Age: 33
End: 2023-08-03

## 2023-08-03 DIAGNOSIS — F41.9 ANXIETY: ICD-10-CM

## 2023-08-03 RX ORDER — ALPRAZOLAM 0.5 MG/1
0.5 TABLET ORAL NIGHTLY PRN
Qty: 30 TABLET | Refills: 0 | Status: SHIPPED | OUTPATIENT
Start: 2023-08-03 | End: 2023-09-02

## 2023-08-03 NOTE — TELEPHONE ENCOUNTER
From: Ayush Oshea  To: Dr. Fuchs Batch: 8/3/2023 1:36 PM EDT  Subject: Xanax refill    Hi Dr. Aracely Verma, I am down to my last xanax pill and would like to request a refill please. Thank you!

## 2023-08-03 NOTE — TELEPHONE ENCOUNTER
Last OV: 7/24/2023  chronic   Last RX:    Next scheduled apt: 10/24/2023  anxiety           Pt requesting a refill

## 2023-08-16 DIAGNOSIS — F90.2 ATTENTION DEFICIT HYPERACTIVITY DISORDER (ADHD), COMBINED TYPE: ICD-10-CM

## 2023-08-16 RX ORDER — METHYLPHENIDATE HYDROCHLORIDE 18 MG/1
18 TABLET ORAL EVERY MORNING
Qty: 30 TABLET | Refills: 0 | Status: SHIPPED | OUTPATIENT
Start: 2023-08-16 | End: 2023-09-15

## 2023-08-16 NOTE — TELEPHONE ENCOUNTER
Last OV: 7/24/2023  chronic   Last RX:    Next scheduled apt: 10/24/2023  anxiety.  ADD           Pt requesting a refill

## 2023-09-12 ENCOUNTER — PATIENT MESSAGE (OUTPATIENT)
Dept: FAMILY MEDICINE CLINIC | Age: 33
End: 2023-09-12

## 2023-09-12 DIAGNOSIS — F41.9 ANXIETY: ICD-10-CM

## 2023-09-12 RX ORDER — ALPRAZOLAM 0.5 MG/1
0.5 TABLET ORAL NIGHTLY PRN
Qty: 30 TABLET | Refills: 0 | Status: SHIPPED | OUTPATIENT
Start: 2023-09-12 | End: 2023-10-12

## 2023-09-12 NOTE — TELEPHONE ENCOUNTER
From: Elvis Oshea  To: Dr. Betsy Dumont: 9/12/2023 11:02 AM EDT  Subject: Xanax refill    Good morning Dr Chema Jiménez, I'm down to my last xanax and would like to request a refill. I also would like to discontinue concerta at this time as I believe it is causing me more anxiety than it helps with, plus other side effects that are negatively impacting my life more than helping. Thanks!  Denagelo

## 2023-09-18 DIAGNOSIS — F90.2 ATTENTION DEFICIT HYPERACTIVITY DISORDER (ADHD), COMBINED TYPE: ICD-10-CM

## 2023-09-18 RX ORDER — METHYLPHENIDATE HYDROCHLORIDE 18 MG/1
18 TABLET ORAL EVERY MORNING
Qty: 30 TABLET | Refills: 0 | Status: SHIPPED | OUTPATIENT
Start: 2023-09-18 | End: 2023-10-18

## 2023-10-24 ENCOUNTER — OFFICE VISIT (OUTPATIENT)
Dept: FAMILY MEDICINE CLINIC | Age: 33
End: 2023-10-24
Payer: COMMERCIAL

## 2023-10-24 VITALS
BODY MASS INDEX: 37.18 KG/M2 | SYSTOLIC BLOOD PRESSURE: 132 MMHG | OXYGEN SATURATION: 97 % | DIASTOLIC BLOOD PRESSURE: 88 MMHG | HEART RATE: 68 BPM | HEIGHT: 69 IN | WEIGHT: 251 LBS

## 2023-10-24 DIAGNOSIS — F41.9 ANXIETY: ICD-10-CM

## 2023-10-24 DIAGNOSIS — F90.2 ATTENTION DEFICIT HYPERACTIVITY DISORDER (ADHD), COMBINED TYPE: Primary | ICD-10-CM

## 2023-10-24 DIAGNOSIS — K21.9 GASTROESOPHAGEAL REFLUX DISEASE WITHOUT ESOPHAGITIS: ICD-10-CM

## 2023-10-24 PROCEDURE — 99214 OFFICE O/P EST MOD 30 MIN: CPT | Performed by: FAMILY MEDICINE

## 2023-10-24 RX ORDER — DEXTROAMPHETAMINE SACCHARATE, AMPHETAMINE ASPARTATE MONOHYDRATE, DEXTROAMPHETAMINE SULFATE AND AMPHETAMINE SULFATE 2.5; 2.5; 2.5; 2.5 MG/1; MG/1; MG/1; MG/1
10 CAPSULE, EXTENDED RELEASE ORAL DAILY
Qty: 30 CAPSULE | Refills: 0 | Status: SHIPPED | OUTPATIENT
Start: 2023-10-24 | End: 2023-11-24

## 2023-10-24 RX ORDER — ALPRAZOLAM 0.5 MG/1
0.5 TABLET ORAL NIGHTLY PRN
Qty: 30 TABLET | Refills: 0 | Status: SHIPPED | OUTPATIENT
Start: 2023-10-24 | End: 2023-11-23

## 2023-10-24 ASSESSMENT — ENCOUNTER SYMPTOMS
SORE THROAT: 0
VOMITING: 0
HEARTBURN: 0
SHORTNESS OF BREATH: 0
COUGH: 0
CHOKING: 0
NAUSEA: 0

## 2023-10-24 NOTE — PROGRESS NOTES
HPI Notes    Name: Abran Dance  : 1990        Chief Complaint:     Chief Complaint   Patient presents with    Anxiety    ADD    Gastroesophageal Reflux       History of Present Illness:     Abran Dance is a 34 y.o.  male who presents with Anxiety, ADD, and Gastroesophageal Reflux      Anxiety  Presents for follow-up (Pt has had a very stressful month as --- he got  on Oct 7th and then his mom had a heart attack. Thus, pt has been taking more xanax. pt is taking celexa.) visit. Symptoms include excessive worry and nervous/anxious behavior. Patient reports no chest pain, depressed mood, dizziness, insomnia, nausea, shortness of breath or suicidal ideas. Symptoms occur most days. The quality of sleep is fair. Compliance with medications is %. ADD  This is a recurrent problem. The current episode started more than 1 year ago. The problem occurs daily (pt is not staying on task as well has he should). The problem has been gradually worsening (ongoing --- pt did stop the concerta as he felt more irritable or angry. He was [place] more at his fiance/wife. \"things would get under his skin\" so stopped concerta on Oct 8th. but off the medication not finishing/starting task, procrastinating etc.). Pertinent negatives include no arthralgias, chest pain, chills, congestion, coughing, fever, headaches, myalgias, nausea, numbness, rash, sore throat or vomiting. Treatments tried: pt has stopped off the concerta but would like to try adderall extended release now. .. . pt has also tried the vyvanse too. Gastroesophageal Reflux  He reports no chest pain, no choking, no coughing, no heartburn, no nausea or no sore throat. This is a chronic problem. The current episode started more than 1 year ago. The problem has been unchanged. The symptoms are aggravated by certain foods. Pertinent negatives include no melena or weight loss. He has tried a PPI for the symptoms.  The treatment provided

## 2023-11-28 ENCOUNTER — OFFICE VISIT (OUTPATIENT)
Dept: FAMILY MEDICINE CLINIC | Age: 33
End: 2023-11-28
Payer: COMMERCIAL

## 2023-11-28 VITALS
WEIGHT: 249 LBS | HEART RATE: 97 BPM | SYSTOLIC BLOOD PRESSURE: 138 MMHG | OXYGEN SATURATION: 100 % | BODY MASS INDEX: 36.88 KG/M2 | HEIGHT: 69 IN | DIASTOLIC BLOOD PRESSURE: 86 MMHG

## 2023-11-28 DIAGNOSIS — F90.2 ATTENTION DEFICIT HYPERACTIVITY DISORDER (ADHD), COMBINED TYPE: ICD-10-CM

## 2023-11-28 DIAGNOSIS — F41.9 ANXIETY: Primary | ICD-10-CM

## 2023-11-28 PROCEDURE — 99213 OFFICE O/P EST LOW 20 MIN: CPT | Performed by: FAMILY MEDICINE

## 2023-11-28 RX ORDER — ALPRAZOLAM 0.5 MG/1
0.5 TABLET ORAL NIGHTLY PRN
Qty: 30 TABLET | Refills: 0 | Status: SHIPPED | OUTPATIENT
Start: 2023-11-28 | End: 2023-12-28

## 2023-11-28 RX ORDER — DEXTROAMPHETAMINE SACCHARATE, AMPHETAMINE ASPARTATE MONOHYDRATE, DEXTROAMPHETAMINE SULFATE AND AMPHETAMINE SULFATE 5; 5; 5; 5 MG/1; MG/1; MG/1; MG/1
20 CAPSULE, EXTENDED RELEASE ORAL EVERY MORNING
Qty: 30 CAPSULE | Refills: 0 | Status: SHIPPED | OUTPATIENT
Start: 2023-11-28 | End: 2023-12-28

## 2023-11-28 ASSESSMENT — ENCOUNTER SYMPTOMS
SHORTNESS OF BREATH: 0
EYE PAIN: 0
COUGH: 0

## 2023-11-28 NOTE — PROGRESS NOTES
Value Date/Time     07/19/2023 07:29 AM    K 4.3 07/19/2023 07:29 AM    CL 98 07/19/2023 07:29 AM    CO2 28 07/19/2023 07:29 AM    BUN 17 07/19/2023 07:29 AM    CREATININE 0.8 07/19/2023 07:29 AM    GLUCOSE 97 07/19/2023 07:29 AM    PROT 6.8 11/01/2021 07:23 AM    LABALBU 4.4 11/01/2021 07:23 AM    BILITOT 0.45 11/01/2021 07:23 AM    ALKPHOS 64 11/01/2021 07:23 AM    AST 17 11/01/2021 07:23 AM    ALT 29 11/01/2021 07:23 AM     Lab Results   Component Value Date/Time    WBC 6.4 11/01/2021 07:23 AM    RBC 5.27 11/01/2021 07:23 AM    HGB 15.0 11/01/2021 07:23 AM    HCT 43.8 11/01/2021 07:23 AM    MCV 83.1 11/01/2021 07:23 AM    MCH 28.5 11/01/2021 07:23 AM    MCHC 34.3 11/01/2021 07:23 AM    RDW 13.3 11/01/2021 07:23 AM     11/01/2021 07:23 AM    MPV NOT REPORTED 11/01/2021 07:23 AM     Lab Results   Component Value Date/Time    TSH 1.15 08/03/2012 01:57 PM     Lab Results   Component Value Date/Time    CHOL 263 01/21/2020 08:35 AM    HDL 43 01/21/2020 08:35 AM          Assessment/Plan:        1. Anxiety  Stable on the celexa and prn xanax     2. Attention deficit hyperactivity disorder (ADHD), combined type  Will increase the adderall from 10mg to 20mg and marilee in 2mos         Return in about 2 months (around 1/28/2024) for Anxiety, ADD.       Electronically signed by Cachorro Jerez MD on 11/28/2023 at 11:19 AM

## 2023-12-14 ENCOUNTER — TELEPHONE (OUTPATIENT)
Dept: FAMILY MEDICINE CLINIC | Age: 33
End: 2023-12-14

## 2023-12-14 DIAGNOSIS — F90.2 ATTENTION DEFICIT HYPERACTIVITY DISORDER (ADHD), COMBINED TYPE: Primary | ICD-10-CM

## 2023-12-14 RX ORDER — DEXTROAMPHETAMINE SACCHARATE, AMPHETAMINE ASPARTATE MONOHYDRATE, DEXTROAMPHETAMINE SULFATE AND AMPHETAMINE SULFATE 3.75; 3.75; 3.75; 3.75 MG/1; MG/1; MG/1; MG/1
15 CAPSULE, EXTENDED RELEASE ORAL
Qty: 30 CAPSULE | Refills: 0 | Status: SHIPPED | OUTPATIENT
Start: 2023-12-14 | End: 2023-12-15

## 2023-12-14 NOTE — TELEPHONE ENCOUNTER
Tell pt he may try taking an additional Adderall 15mg at lunch and see. I sent the medication to take 20mg in AM and 15mg in pm. If AM only lasting 6hrs then I have the same XR medication for PM. Keep January appt and if any more changes sooner then make appt sooner.

## 2023-12-14 NOTE — TELEPHONE ENCOUNTER
Pt called stating that the Adderall 20 mg is working well. He takes the medication about 7:00 am and it last until noon or 1:00 pm. Pt would like to know if a 15 MG IR in the afternoon can be added as a booster.  Please advise

## 2023-12-15 RX ORDER — DEXTROAMPHETAMINE SACCHARATE, AMPHETAMINE ASPARTATE, DEXTROAMPHETAMINE SULFATE AND AMPHETAMINE SULFATE 3.75; 3.75; 3.75; 3.75 MG/1; MG/1; MG/1; MG/1
15 TABLET ORAL DAILY
Qty: 30 TABLET | Refills: 0 | Status: SHIPPED | OUTPATIENT
Start: 2023-12-15 | End: 2024-01-15

## 2023-12-15 NOTE — TELEPHONE ENCOUNTER
Pt would like to know if the adderall can be changed from XR to IR             Pt needs this sent to [facility] in [address] is out of stock

## 2023-12-26 DIAGNOSIS — F90.2 ATTENTION DEFICIT HYPERACTIVITY DISORDER (ADHD), COMBINED TYPE: ICD-10-CM

## 2023-12-26 RX ORDER — DEXTROAMPHETAMINE SACCHARATE, AMPHETAMINE ASPARTATE MONOHYDRATE, DEXTROAMPHETAMINE SULFATE AND AMPHETAMINE SULFATE 5; 5; 5; 5 MG/1; MG/1; MG/1; MG/1
20 CAPSULE, EXTENDED RELEASE ORAL EVERY MORNING
Qty: 30 CAPSULE | Refills: 0 | Status: SHIPPED | OUTPATIENT
Start: 2023-12-26 | End: 2024-01-25

## 2024-01-02 ENCOUNTER — PATIENT MESSAGE (OUTPATIENT)
Dept: FAMILY MEDICINE CLINIC | Age: 34
End: 2024-01-02

## 2024-01-02 DIAGNOSIS — F41.9 ANXIETY: ICD-10-CM

## 2024-01-02 RX ORDER — ALPRAZOLAM 0.5 MG/1
0.5 TABLET ORAL NIGHTLY PRN
Qty: 30 TABLET | Refills: 2 | Status: SHIPPED | OUTPATIENT
Start: 2024-01-02 | End: 2024-04-01

## 2024-01-02 NOTE — TELEPHONE ENCOUNTER
From: Jeff Oshea  To: Dr. Lacy Gonzales  Sent: 1/2/2024 8:08 AM EST  Subject: Xanax refill    Good morning Dr. Gonzales, I am down to my last xanax and would like to request a refill please. Thanks!

## 2024-01-02 NOTE — TELEPHONE ENCOUNTER
Last OV 11/28/23 for anxiety, ADHD  Requesting refill on xanax thru my chart  Rx pending  Next OV 1/24/24

## 2024-01-04 DIAGNOSIS — F41.9 ANXIETY: ICD-10-CM

## 2024-01-04 RX ORDER — CITALOPRAM 20 MG/1
TABLET ORAL
Qty: 90 TABLET | Refills: 1 | Status: SHIPPED | OUTPATIENT
Start: 2024-01-04

## 2024-01-04 NOTE — TELEPHONE ENCOUNTER
Last OV 11/28/23 for anxiety and ADHD    Requesting refill on Citalopram thru [facility].   RX pending.     Next OV 01/24/24

## 2024-01-12 DIAGNOSIS — F90.2 ATTENTION DEFICIT HYPERACTIVITY DISORDER (ADHD), COMBINED TYPE: ICD-10-CM

## 2024-01-12 RX ORDER — DEXTROAMPHETAMINE SACCHARATE, AMPHETAMINE ASPARTATE, DEXTROAMPHETAMINE SULFATE AND AMPHETAMINE SULFATE 3.75; 3.75; 3.75; 3.75 MG/1; MG/1; MG/1; MG/1
15 TABLET ORAL DAILY
Qty: 30 TABLET | Refills: 0 | Status: SHIPPED | OUTPATIENT
Start: 2024-01-12 | End: 2024-02-12

## 2024-01-12 NOTE — TELEPHONE ENCOUNTER
Last OV 11/28/23 for anxiety and ADHD    Requesting refill on Adderall   Rx pending     Next OV 01/24/24

## 2024-01-24 ENCOUNTER — OFFICE VISIT (OUTPATIENT)
Dept: FAMILY MEDICINE CLINIC | Age: 34
End: 2024-01-24
Payer: COMMERCIAL

## 2024-01-24 VITALS
HEART RATE: 92 BPM | DIASTOLIC BLOOD PRESSURE: 86 MMHG | BODY MASS INDEX: 35.86 KG/M2 | WEIGHT: 242.8 LBS | OXYGEN SATURATION: 98 % | SYSTOLIC BLOOD PRESSURE: 134 MMHG

## 2024-01-24 DIAGNOSIS — F90.2 ATTENTION DEFICIT HYPERACTIVITY DISORDER (ADHD), COMBINED TYPE: Primary | ICD-10-CM

## 2024-01-24 DIAGNOSIS — F41.9 ANXIETY: ICD-10-CM

## 2024-01-24 PROCEDURE — 99213 OFFICE O/P EST LOW 20 MIN: CPT | Performed by: FAMILY MEDICINE

## 2024-01-24 RX ORDER — DEXTROAMPHETAMINE SACCHARATE, AMPHETAMINE ASPARTATE MONOHYDRATE, DEXTROAMPHETAMINE SULFATE AND AMPHETAMINE SULFATE 5; 5; 5; 5 MG/1; MG/1; MG/1; MG/1
20 CAPSULE, EXTENDED RELEASE ORAL EVERY MORNING
Qty: 30 CAPSULE | Refills: 0 | Status: CANCELLED | OUTPATIENT
Start: 2024-01-24 | End: 2024-02-23

## 2024-01-24 RX ORDER — DEXTROAMPHETAMINE SACCHARATE, AMPHETAMINE ASPARTATE MONOHYDRATE, DEXTROAMPHETAMINE SULFATE AND AMPHETAMINE SULFATE 6.25; 6.25; 6.25; 6.25 MG/1; MG/1; MG/1; MG/1
25 CAPSULE, EXTENDED RELEASE ORAL EVERY MORNING
Qty: 30 CAPSULE | Refills: 0 | Status: SHIPPED | OUTPATIENT
Start: 2024-01-24 | End: 2024-02-24

## 2024-01-24 ASSESSMENT — ENCOUNTER SYMPTOMS
SHORTNESS OF BREATH: 0
VOMITING: 0
COUGH: 0
SORE THROAT: 0
CHANGE IN BOWEL HABIT: 0
DIARRHEA: 0

## 2024-01-24 ASSESSMENT — PATIENT HEALTH QUESTIONNAIRE - PHQ9
SUM OF ALL RESPONSES TO PHQ QUESTIONS 1-9: 0
1. LITTLE INTEREST OR PLEASURE IN DOING THINGS: 0
SUM OF ALL RESPONSES TO PHQ QUESTIONS 1-9: 0
2. FEELING DOWN, DEPRESSED OR HOPELESS: 0
SUM OF ALL RESPONSES TO PHQ9 QUESTIONS 1 & 2: 0

## 2024-01-24 NOTE — PROGRESS NOTES
HPI Notes    Name: Jeff Oshea  : 1990        Chief Complaint:     Chief Complaint   Patient presents with    ADHD     2 month follow up. Adderral increased from 10mg to  20mg QD at last visit. Patient notices an improvement. Patient would like to discuss increasing the dose.     Mental Health Problem     2 month follow up.        History of Present Illness:     Jeff Oshea is a 33 y.o.  male who presents with ADHD (2 month follow up. Adderral increased from 10mg to  20mg QD at last visit. Patient notices an improvement. Patient would like to discuss increasing the dose. ) and Mental Health Problem (2 month follow up. )      ADHD  This is a chronic problem. The current episode started more than 1 year ago. Progression since onset: pt is doing better with the after noon but feels like he could use a higher dose of Adderall in AM to help with focus. Feels like the 20mg is not helping as much--- Pt states he feels it is not helping as much as it was in past. Pertinent negatives include no change in bowel habit, chest pain, chills, congestion, coughing, fever, rash, sore throat or vomiting. Treatments tried: adderall 20mg in AM and 15mg in after and pt is sleeping well.   Mental Health Problem  The primary symptoms do not include dysphoric mood or disorganized speech. Primary symptoms comment: anxiety is stable to typical for pt.  Pt is taking his celexa daily and only takes the xanax as needed again. Actually, currently not taking the xanax as much so doing better.. The current episode started more than 1 month ago. This is a chronic problem.   The degree of incapacity that he is experiencing as a consequence of his illness is mild.       Past Medical History:     Past Medical History:   Diagnosis Date    Anxiety     Heart palpitations     Hypertension       Reviewed all health maintenance requirements and ordered appropriate tests  Health Maintenance Due   Topic Date Due    Hepatitis B

## 2024-02-13 DIAGNOSIS — F90.2 ATTENTION DEFICIT HYPERACTIVITY DISORDER (ADHD), COMBINED TYPE: ICD-10-CM

## 2024-02-13 RX ORDER — DEXTROAMPHETAMINE SACCHARATE, AMPHETAMINE ASPARTATE, DEXTROAMPHETAMINE SULFATE AND AMPHETAMINE SULFATE 3.75; 3.75; 3.75; 3.75 MG/1; MG/1; MG/1; MG/1
15 TABLET ORAL DAILY
Qty: 30 TABLET | Refills: 0 | Status: SHIPPED | OUTPATIENT
Start: 2024-02-13 | End: 2024-03-15

## 2024-02-13 NOTE — TELEPHONE ENCOUNTER
Last OV: 1/24/2024 ADHD   Last RX:    Next scheduled apt: 4/24/2024   3 MONTHS ADD           Pt requesting a refill

## 2024-02-15 DIAGNOSIS — F41.9 ANXIETY: ICD-10-CM

## 2024-02-15 RX ORDER — ALPRAZOLAM 0.5 MG/1
0.5 TABLET ORAL NIGHTLY PRN
Qty: 30 TABLET | Refills: 2 | OUTPATIENT
Start: 2024-02-15 | End: 2024-05-15

## 2024-02-22 DIAGNOSIS — F90.2 ATTENTION DEFICIT HYPERACTIVITY DISORDER (ADHD), COMBINED TYPE: ICD-10-CM

## 2024-02-22 RX ORDER — DEXTROAMPHETAMINE SACCHARATE, AMPHETAMINE ASPARTATE MONOHYDRATE, DEXTROAMPHETAMINE SULFATE AND AMPHETAMINE SULFATE 6.25; 6.25; 6.25; 6.25 MG/1; MG/1; MG/1; MG/1
25 CAPSULE, EXTENDED RELEASE ORAL EVERY MORNING
Qty: 30 CAPSULE | Refills: 0 | Status: SHIPPED | OUTPATIENT
Start: 2024-02-22 | End: 2024-03-24

## 2024-02-22 NOTE — TELEPHONE ENCOUNTER
Last OV 01/24/24 ADHD    Requesting refill on Adderall 25mg thru My[facility].   Rx pending     Next OV 04/24/24

## 2024-03-11 DIAGNOSIS — F90.2 ATTENTION DEFICIT HYPERACTIVITY DISORDER (ADHD), COMBINED TYPE: ICD-10-CM

## 2024-03-11 RX ORDER — DEXTROAMPHETAMINE SACCHARATE, AMPHETAMINE ASPARTATE, DEXTROAMPHETAMINE SULFATE AND AMPHETAMINE SULFATE 3.75; 3.75; 3.75; 3.75 MG/1; MG/1; MG/1; MG/1
15 TABLET ORAL DAILY
Qty: 30 TABLET | Refills: 0 | Status: SHIPPED | OUTPATIENT
Start: 2024-03-11 | End: 2024-03-13

## 2024-03-11 NOTE — TELEPHONE ENCOUNTER
Last OV 01/24/24 ADHD    Next OV 04/24/24    Requesting refill on adderall.   Pt would like to increase to 20 mg.

## 2024-03-13 ENCOUNTER — PATIENT MESSAGE (OUTPATIENT)
Dept: FAMILY MEDICINE CLINIC | Age: 34
End: 2024-03-13

## 2024-03-13 DIAGNOSIS — F90.2 ATTENTION DEFICIT HYPERACTIVITY DISORDER (ADHD), COMBINED TYPE: ICD-10-CM

## 2024-03-13 RX ORDER — DEXTROAMPHETAMINE SACCHARATE, AMPHETAMINE ASPARTATE, DEXTROAMPHETAMINE SULFATE AND AMPHETAMINE SULFATE 5; 5; 5; 5 MG/1; MG/1; MG/1; MG/1
20 TABLET ORAL DAILY
Qty: 30 TABLET | Refills: 0 | Status: SHIPPED | OUTPATIENT
Start: 2024-03-13 | End: 2024-04-12

## 2024-03-13 NOTE — TELEPHONE ENCOUNTER
From: Jeff Oshea  To: Dr. Lacy Gonzales  Sent: 3/13/2024 10:32 AM EDT  Subject: Regarding Adderall 15mg IR refill    Good morning Dr. Gonzales, I was wondering if we could try upping the IR booster to 20mg as the current 15mg doesn't seem to be working quite well enough on helping with focus and concentration in the afternoons. I put a note in my recent refill request on Monday, which I don't think has been filled yet, so I don't think it's too late. No worries if not. Let me know, thanks!

## 2024-03-13 NOTE — TELEPHONE ENCOUNTER
Pt had not picked up the adderall 15mg to called to cancel that Rx and sent the Adderall 20mg instead

## 2024-03-22 DIAGNOSIS — F90.2 ATTENTION DEFICIT HYPERACTIVITY DISORDER (ADHD), COMBINED TYPE: ICD-10-CM

## 2024-03-22 RX ORDER — DEXTROAMPHETAMINE SACCHARATE, AMPHETAMINE ASPARTATE MONOHYDRATE, DEXTROAMPHETAMINE SULFATE AND AMPHETAMINE SULFATE 6.25; 6.25; 6.25; 6.25 MG/1; MG/1; MG/1; MG/1
25 CAPSULE, EXTENDED RELEASE ORAL EVERY MORNING
Qty: 30 CAPSULE | Refills: 0 | Status: SHIPPED | OUTPATIENT
Start: 2024-03-22 | End: 2024-04-22

## 2024-03-22 NOTE — TELEPHONE ENCOUNTER
Last OV: 1/24/2024  ADHD    Last RX:    Next scheduled apt: 4/24/2024   3 MONTHS ADD           Pt requesting a refill   Medication pending for a approval

## 2024-04-01 ENCOUNTER — OFFICE VISIT (OUTPATIENT)
Dept: FAMILY MEDICINE CLINIC | Age: 34
End: 2024-04-01
Payer: COMMERCIAL

## 2024-04-01 VITALS
BODY MASS INDEX: 35.78 KG/M2 | SYSTOLIC BLOOD PRESSURE: 154 MMHG | HEIGHT: 69 IN | TEMPERATURE: 98.2 F | HEART RATE: 104 BPM | OXYGEN SATURATION: 97 % | DIASTOLIC BLOOD PRESSURE: 108 MMHG | WEIGHT: 241.6 LBS

## 2024-04-01 DIAGNOSIS — R09.82 PND (POST-NASAL DRIP): ICD-10-CM

## 2024-04-01 DIAGNOSIS — J06.9 VIRAL URI: Primary | ICD-10-CM

## 2024-04-01 DIAGNOSIS — L24.7 IRRITANT CONTACT DERMATITIS DUE TO PLANTS, EXCEPT FOOD: ICD-10-CM

## 2024-04-01 PROCEDURE — 99213 OFFICE O/P EST LOW 20 MIN: CPT | Performed by: NURSE PRACTITIONER

## 2024-04-01 RX ORDER — PREDNISONE 20 MG/1
40 TABLET ORAL DAILY
Qty: 10 TABLET | Refills: 0 | Status: SHIPPED | OUTPATIENT
Start: 2024-04-01 | End: 2024-04-06

## 2024-04-01 SDOH — ECONOMIC STABILITY: FOOD INSECURITY: WITHIN THE PAST 12 MONTHS, THE FOOD YOU BOUGHT JUST DIDN'T LAST AND YOU DIDN'T HAVE MONEY TO GET MORE.: NEVER TRUE

## 2024-04-01 SDOH — ECONOMIC STABILITY: INCOME INSECURITY: HOW HARD IS IT FOR YOU TO PAY FOR THE VERY BASICS LIKE FOOD, HOUSING, MEDICAL CARE, AND HEATING?: NOT HARD AT ALL

## 2024-04-01 SDOH — ECONOMIC STABILITY: FOOD INSECURITY: WITHIN THE PAST 12 MONTHS, YOU WORRIED THAT YOUR FOOD WOULD RUN OUT BEFORE YOU GOT MONEY TO BUY MORE.: NEVER TRUE

## 2024-04-01 ASSESSMENT — ENCOUNTER SYMPTOMS
VOMITING: 0
RHINORRHEA: 1
NAUSEA: 0
COUGH: 1
SHORTNESS OF BREATH: 0
SORE THROAT: 1

## 2024-04-01 NOTE — PROGRESS NOTES
Diagnosis Orders   1. Viral URI        2. PND (post-nasal drip)        3. Irritant contact dermatitis due to plants, except food          Flonase 2 spray each nostril twice daily (nasal steroid)  Claritin 10mg twice Daily (antihistamine)  Pepcid 20mg BID  Will start on prednisone 40mg daily x 5 days                      Completed Refills   Requested Prescriptions     Signed Prescriptions Disp Refills    predniSONE (DELTASONE) 20 MG tablet 10 tablet 0     Sig: Take 2 tablets by mouth daily for 5 days     No follow-ups on file.  Orders Placed This Encounter   Medications    predniSONE (DELTASONE) 20 MG tablet     Sig: Take 2 tablets by mouth daily for 5 days     Dispense:  10 tablet     Refill:  0     No orders of the defined types were placed in this encounter.        Patient Instructions     SURVEY:    You may be receiving a survey from [facility] regarding your visit today.    Please complete the survey to enable us to provide the highest quality of care to you and your family.    If you cannot score us a very good on any question, please call the office to discuss how we could have made your experience a very good one.    Thank you.     Electronically signed by Reggie Singh DNP,APRN,CNP  on 4/1/2024 at 11:28 AM           Completed Refills   Requested Prescriptions     Signed Prescriptions Disp Refills    predniSONE (DELTASONE) 20 MG tablet 10 tablet 0     Sig: Take 2 tablets by mouth daily for 5 days

## 2024-04-12 DIAGNOSIS — F90.2 ATTENTION DEFICIT HYPERACTIVITY DISORDER (ADHD), COMBINED TYPE: ICD-10-CM

## 2024-04-12 RX ORDER — DEXTROAMPHETAMINE SACCHARATE, AMPHETAMINE ASPARTATE, DEXTROAMPHETAMINE SULFATE AND AMPHETAMINE SULFATE 5; 5; 5; 5 MG/1; MG/1; MG/1; MG/1
20 TABLET ORAL DAILY
Qty: 30 TABLET | Refills: 0 | Status: SHIPPED | OUTPATIENT
Start: 2024-04-12 | End: 2024-05-12

## 2024-04-12 NOTE — TELEPHONE ENCOUNTER
Last OV 4/1/24 acute, 1/24/24 ADHD    Next OV 4/24/24    Requesting refill on adderall thru Mychart.   Rx pending

## 2024-04-22 DIAGNOSIS — F90.2 ATTENTION DEFICIT HYPERACTIVITY DISORDER (ADHD), COMBINED TYPE: ICD-10-CM

## 2024-04-22 RX ORDER — DEXTROAMPHETAMINE SACCHARATE, AMPHETAMINE ASPARTATE MONOHYDRATE, DEXTROAMPHETAMINE SULFATE AND AMPHETAMINE SULFATE 6.25; 6.25; 6.25; 6.25 MG/1; MG/1; MG/1; MG/1
25 CAPSULE, EXTENDED RELEASE ORAL EVERY MORNING
Qty: 30 CAPSULE | Refills: 0 | Status: SHIPPED | OUTPATIENT
Start: 2024-04-22 | End: 2024-05-23

## 2024-04-22 NOTE — TELEPHONE ENCOUNTER
Last OV 4/1/24 acute, 1/24/24 ADHD    Next OV 4/24/24     Requesting refill on adderall XR 25mg thru [facility].  Rx pending

## 2024-04-24 ENCOUNTER — OFFICE VISIT (OUTPATIENT)
Dept: FAMILY MEDICINE CLINIC | Age: 34
End: 2024-04-24
Payer: COMMERCIAL

## 2024-04-24 VITALS
DIASTOLIC BLOOD PRESSURE: 82 MMHG | HEART RATE: 86 BPM | BODY MASS INDEX: 36.48 KG/M2 | OXYGEN SATURATION: 98 % | SYSTOLIC BLOOD PRESSURE: 138 MMHG | WEIGHT: 247 LBS

## 2024-04-24 DIAGNOSIS — Z31.41 ENCOUNTER FOR SPERM COUNT FOR FERTILITY TESTING: ICD-10-CM

## 2024-04-24 DIAGNOSIS — F90.2 ATTENTION DEFICIT HYPERACTIVITY DISORDER (ADHD), COMBINED TYPE: Primary | ICD-10-CM

## 2024-04-24 PROCEDURE — 99213 OFFICE O/P EST LOW 20 MIN: CPT | Performed by: FAMILY MEDICINE

## 2024-04-24 ASSESSMENT — ENCOUNTER SYMPTOMS
COUGH: 0
EYE DISCHARGE: 0
TROUBLE SWALLOWING: 0
VOMITING: 0
DIARRHEA: 0
EYE REDNESS: 0
SHORTNESS OF BREATH: 0
ABDOMINAL PAIN: 0
CONSTIPATION: 0
NAUSEA: 0
BLOOD IN STOOL: 0

## 2024-04-24 NOTE — PATIENT INSTRUCTIONS
SURVEY:    You may be receiving a survey from [facility] regarding your visit today.    Please complete the survey to enable us to provide the highest quality of care to you and your family.    If you cannot score us a very good (5 Stars) on any question, please call the office to discuss how we could have made your experience a very good one.    Thank you.    Clinical Care Team: MD Tino Britton LPN              Triage: Bryanna Scott CMA              Clerical Team: Bryanna Li

## 2024-04-24 NOTE — PROGRESS NOTES
HPI Notes    Name: Jeff Oshea  : 1990        Chief Complaint:     Chief Complaint   Patient presents with    ADHD     3 month follow up. Adderall increased to 25 mg every am and 20 mg every pm. Patient said the medication is working well for him.        History of Present Illness:     Jeff Oshea is a 33 y.o.  male who presents with ADHD (3 month follow up. Adderall increased to 25 mg every am and 20 mg every pm. Patient said the medication is working well for him. )      ADHD  This is a chronic problem. The current episode started more than 1 year ago (pt is here for check up for his ADHD and doing well on the current doses. 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 The problem has been unchanged. Pertinent negatives include no abdominal pain, chest pain, chills, coughing, fatigue, fever, nausea, rash or vomiting. Treatments tried: pt taking Adderall XR 25mg in am and then the Adderall 20mg in afternoon.     Concern for low sperm count - pt is newly  and starting to think about having a family. Pt did a home sperm count test and results were low per pt. So pt would like to see urology and have any further testing. Pt's wife seeing GYN too and monitoring ovulation as she is 37.     Past Medical History:     Past Medical History:   Diagnosis Date    Anxiety     Heart palpitations     Hypertension       Reviewed all health maintenance requirements and ordered appropriate tests  Health Maintenance Due   Topic Date Due    Hepatitis B vaccine (1 of 3 - 3-dose series) Never done    COVID-19 Vaccine (2023- season) 2023       Past Surgical History:     Past Surgical History:   Procedure Laterality Date    CYST REMOVAL  2010    left wrist        Medications:       Prior to Admission medications    Medication Sig Start Date End Date Taking? Authorizing Provider   amphetamine-dextroamphetamine (ADDERALL XR) 25 MG extended release capsule Take 1

## 2024-04-30 ENCOUNTER — PATIENT MESSAGE (OUTPATIENT)
Dept: FAMILY MEDICINE CLINIC | Age: 34
End: 2024-04-30

## 2024-04-30 DIAGNOSIS — F41.9 ANXIETY: ICD-10-CM

## 2024-04-30 RX ORDER — ALPRAZOLAM 0.5 MG/1
0.5 TABLET ORAL NIGHTLY PRN
Qty: 30 TABLET | Refills: 2 | Status: SHIPPED | OUTPATIENT
Start: 2024-04-30 | End: 2024-07-29

## 2024-04-30 NOTE — TELEPHONE ENCOUNTER
From: Jeff Oshea  To: Dr. Lacy Gonzales  Sent: 4/30/2024 9:11 AM EDT  Subject: Xanax Refill    Good Morning Dr. Gonzales, I am down to my last pill and would like to request a refill on my Alprazolam 0.5 mg. Thanks!

## 2024-04-30 NOTE — TELEPHONE ENCOUNTER
Last OV: 4/24/2024 01/24/24 anxiety   Last RX:    Next scheduled apt: 7/31/2024  ADHD, anxiety           Pt requesting a refill   Medication pending for approval

## 2024-05-10 DIAGNOSIS — F90.2 ATTENTION DEFICIT HYPERACTIVITY DISORDER (ADHD), COMBINED TYPE: ICD-10-CM

## 2024-05-10 RX ORDER — DEXTROAMPHETAMINE SACCHARATE, AMPHETAMINE ASPARTATE, DEXTROAMPHETAMINE SULFATE AND AMPHETAMINE SULFATE 5; 5; 5; 5 MG/1; MG/1; MG/1; MG/1
20 TABLET ORAL DAILY
Qty: 30 TABLET | Refills: 0 | Status: SHIPPED | OUTPATIENT
Start: 2024-05-10 | End: 2024-06-09

## 2024-05-10 NOTE — TELEPHONE ENCOUNTER
Last OV 4/24/24 AHDH     Next OV 7/31/24    Requesting refill on adderall 20mg thru Mychart.  Rx pending

## 2024-05-21 DIAGNOSIS — F90.2 ATTENTION DEFICIT HYPERACTIVITY DISORDER (ADHD), COMBINED TYPE: ICD-10-CM

## 2024-05-21 RX ORDER — DEXTROAMPHETAMINE SACCHARATE, AMPHETAMINE ASPARTATE MONOHYDRATE, DEXTROAMPHETAMINE SULFATE AND AMPHETAMINE SULFATE 6.25; 6.25; 6.25; 6.25 MG/1; MG/1; MG/1; MG/1
25 CAPSULE, EXTENDED RELEASE ORAL EVERY MORNING
Qty: 30 CAPSULE | Refills: 0 | Status: SHIPPED | OUTPATIENT
Start: 2024-05-21 | End: 2024-06-21

## 2024-06-07 DIAGNOSIS — F90.2 ATTENTION DEFICIT HYPERACTIVITY DISORDER (ADHD), COMBINED TYPE: ICD-10-CM

## 2024-06-07 RX ORDER — DEXTROAMPHETAMINE SACCHARATE, AMPHETAMINE ASPARTATE, DEXTROAMPHETAMINE SULFATE AND AMPHETAMINE SULFATE 5; 5; 5; 5 MG/1; MG/1; MG/1; MG/1
20 TABLET ORAL DAILY
Qty: 30 TABLET | Refills: 0 | Status: SHIPPED | OUTPATIENT
Start: 2024-06-07 | End: 2024-07-07

## 2024-06-19 DIAGNOSIS — F90.2 ATTENTION DEFICIT HYPERACTIVITY DISORDER (ADHD), COMBINED TYPE: ICD-10-CM

## 2024-06-19 RX ORDER — DEXTROAMPHETAMINE SACCHARATE, AMPHETAMINE ASPARTATE MONOHYDRATE, DEXTROAMPHETAMINE SULFATE AND AMPHETAMINE SULFATE 6.25; 6.25; 6.25; 6.25 MG/1; MG/1; MG/1; MG/1
25 CAPSULE, EXTENDED RELEASE ORAL EVERY MORNING
Qty: 30 CAPSULE | Refills: 0 | Status: SHIPPED | OUTPATIENT
Start: 2024-06-19 | End: 2024-07-20

## 2024-06-19 NOTE — TELEPHONE ENCOUNTER
Last OV: 4/24/2024  ADHD   Last RX:    Next scheduled apt: 7/31/2024 ADHD anxiety             Pt requesting a refill

## 2024-06-24 ENCOUNTER — OFFICE VISIT (OUTPATIENT)
Dept: FAMILY MEDICINE CLINIC | Age: 34
End: 2024-06-24
Payer: COMMERCIAL

## 2024-06-24 VITALS — HEART RATE: 100 BPM | SYSTOLIC BLOOD PRESSURE: 152 MMHG | OXYGEN SATURATION: 100 % | DIASTOLIC BLOOD PRESSURE: 104 MMHG

## 2024-06-24 DIAGNOSIS — G56.03 BILATERAL CARPAL TUNNEL SYNDROME: Primary | ICD-10-CM

## 2024-06-24 DIAGNOSIS — S16.1XXA STRAIN OF NECK MUSCLE, INITIAL ENCOUNTER: ICD-10-CM

## 2024-06-24 PROCEDURE — 99213 OFFICE O/P EST LOW 20 MIN: CPT | Performed by: NURSE PRACTITIONER

## 2024-06-24 ASSESSMENT — ENCOUNTER SYMPTOMS
COUGH: 0
NAUSEA: 0
VOMITING: 0
SHORTNESS OF BREATH: 0
DIARRHEA: 0

## 2024-06-24 NOTE — PROGRESS NOTES
release capsule Take 1 capsule by mouth every morning for 31 days. Max Daily Amount: 25 mg 6/19/24 7/20/24 Yes Lacy Gonzales MD   amphetamine-dextroamphetamine (ADDERALL, 20MG,) 20 MG tablet Take 1 tablet by mouth daily for 30 days. Max Daily Amount: 20 mg 6/7/24 7/7/24 Yes Lacy Gonzales MD   ALPRAZolam (XANAX) 0.5 MG tablet Take 1 tablet by mouth nightly as needed for Sleep or Anxiety for up to 90 days. Max Daily Amount: 0.5 mg 4/30/24 7/29/24 Yes Lacy Gonzales MD   citalopram (CELEXA) 20 MG tablet take 1 tablet by mouth daily 1/4/24  Yes Lacy Gonzales MD   Omeprazole Magnesium (PRILOSEC OTC PO) Take by mouth daily   Yes Aamir Jensen MD   magnesium oxide (MAG-OX) 400 MG tablet Take 0.25 tablets by mouth daily Pt takes 2 tablets daily   Yes Aamir Jensen MD   vitamin B-12 (CYANOCOBALAMIN) 100 MCG tablet Take 0.5 tablets by mouth daily   Yes Aamir Jensen MD   Cholecalciferol (VITAMIN D3) 5000 UNITS TABS Pt taking 2 tablet daily   Yes Aamir Jensen MD        Allergies:       Other    Social History:     Tobacco:    reports that he has been smoking e-cigarettes. He has never been exposed to tobacco smoke. He has quit using smokeless tobacco.  Alcohol:      reports no history of alcohol use.  Drug Use:  reports no history of drug use.    Family History:     Family History   Problem Relation Age of Onset    Other Mother 58        had an MI    Arrhythmia Mother     Cancer Mother     Heart Attack Mother     High Cholesterol Father     High Blood Pressure Father     Obesity Father     Cancer Maternal Grandfather     Stroke Paternal Grandfather        Review of Systems:         Review of Systems   Constitutional:  Negative for chills and fever.   Respiratory:  Negative for cough and shortness of breath.    Cardiovascular:  Negative for chest pain and palpitations.   Gastrointestinal:  Negative for diarrhea, nausea and vomiting.   Musculoskeletal:  Positive for arthralgias, 
Yes

## 2024-07-06 DIAGNOSIS — F41.9 ANXIETY: ICD-10-CM

## 2024-07-07 DIAGNOSIS — F90.2 ATTENTION DEFICIT HYPERACTIVITY DISORDER (ADHD), COMBINED TYPE: ICD-10-CM

## 2024-07-08 DIAGNOSIS — F90.2 ATTENTION DEFICIT HYPERACTIVITY DISORDER (ADHD), COMBINED TYPE: ICD-10-CM

## 2024-07-08 RX ORDER — DEXTROAMPHETAMINE SACCHARATE, AMPHETAMINE ASPARTATE, DEXTROAMPHETAMINE SULFATE AND AMPHETAMINE SULFATE 5; 5; 5; 5 MG/1; MG/1; MG/1; MG/1
20 TABLET ORAL DAILY
Qty: 30 TABLET | Refills: 0 | Status: SHIPPED | OUTPATIENT
Start: 2024-07-08 | End: 2024-08-07

## 2024-07-08 RX ORDER — CITALOPRAM 20 MG/1
TABLET ORAL
Qty: 90 TABLET | Refills: 1 | Status: SHIPPED | OUTPATIENT
Start: 2024-07-08

## 2024-07-08 NOTE — TELEPHONE ENCOUNTER
Last OV 6/24/24 for carpal tunnel  Requesting refill on citalopram thru [facility]  Next OV 7/31/24

## 2024-07-15 ENCOUNTER — OFFICE VISIT (OUTPATIENT)
Dept: UROLOGY | Age: 34
End: 2024-07-15
Payer: COMMERCIAL

## 2024-07-15 VITALS
SYSTOLIC BLOOD PRESSURE: 145 MMHG | HEIGHT: 69 IN | BODY MASS INDEX: 37.03 KG/M2 | TEMPERATURE: 97.5 F | DIASTOLIC BLOOD PRESSURE: 105 MMHG | WEIGHT: 250 LBS

## 2024-07-15 DIAGNOSIS — N46.9 MALE INFERTILITY: Primary | ICD-10-CM

## 2024-07-15 PROCEDURE — 99204 OFFICE O/P NEW MOD 45 MIN: CPT | Performed by: UROLOGY

## 2024-07-15 ASSESSMENT — ENCOUNTER SYMPTOMS
BACK PAIN: 0
VOMITING: 0
COLOR CHANGE: 0
EYE REDNESS: 0
ABDOMINAL PAIN: 0
WHEEZING: 0
COUGH: 0
SHORTNESS OF BREATH: 0
NAUSEA: 0
CONSTIPATION: 0

## 2024-07-18 DIAGNOSIS — F90.2 ATTENTION DEFICIT HYPERACTIVITY DISORDER (ADHD), COMBINED TYPE: ICD-10-CM

## 2024-07-18 RX ORDER — DEXTROAMPHETAMINE SACCHARATE, AMPHETAMINE ASPARTATE MONOHYDRATE, DEXTROAMPHETAMINE SULFATE AND AMPHETAMINE SULFATE 6.25; 6.25; 6.25; 6.25 MG/1; MG/1; MG/1; MG/1
25 CAPSULE, EXTENDED RELEASE ORAL EVERY MORNING
Qty: 30 CAPSULE | Refills: 0 | Status: SHIPPED | OUTPATIENT
Start: 2024-07-18 | End: 2024-08-18

## 2024-07-31 ENCOUNTER — OFFICE VISIT (OUTPATIENT)
Dept: FAMILY MEDICINE CLINIC | Age: 34
End: 2024-07-31
Payer: COMMERCIAL

## 2024-07-31 VITALS
BODY MASS INDEX: 36.92 KG/M2 | WEIGHT: 250 LBS | SYSTOLIC BLOOD PRESSURE: 136 MMHG | DIASTOLIC BLOOD PRESSURE: 84 MMHG | OXYGEN SATURATION: 98 % | HEART RATE: 84 BPM

## 2024-07-31 DIAGNOSIS — F90.2 ATTENTION DEFICIT HYPERACTIVITY DISORDER (ADHD), COMBINED TYPE: Primary | ICD-10-CM

## 2024-07-31 DIAGNOSIS — F41.9 ANXIETY: ICD-10-CM

## 2024-07-31 PROCEDURE — 99213 OFFICE O/P EST LOW 20 MIN: CPT | Performed by: FAMILY MEDICINE

## 2024-07-31 ASSESSMENT — ENCOUNTER SYMPTOMS
VOMITING: 0
SORE THROAT: 0
EYE REDNESS: 0
ABDOMINAL PAIN: 0
VISUAL CHANGE: 0
SHORTNESS OF BREATH: 0
CHANGE IN BOWEL HABIT: 0
COUGH: 0
EYE DISCHARGE: 0

## 2024-07-31 NOTE — PROGRESS NOTES
11/01/2021 07:23 AM    MCH 28.5 11/01/2021 07:23 AM    MCHC 34.3 11/01/2021 07:23 AM    RDW 13.3 11/01/2021 07:23 AM     11/01/2021 07:23 AM    MPV NOT REPORTED 11/01/2021 07:23 AM     Lab Results   Component Value Date/Time    TSH 1.15 08/03/2012 01:57 PM     Lab Results   Component Value Date/Time    CHOL 263 01/21/2020 08:35 AM     01/21/2020 08:35 AM    HDL 43 01/21/2020 08:35 AM          Assessment/Plan:        1. Attention deficit hyperactivity disorder (ADHD), combined type  Doing well on the adderall XR 25mg and adderall XR 20mg     2. Anxiety  Stable on the celexa 20mg      Return in about 3 months (around 10/31/2024) for ADHD, Anxiety.      Electronically signed by Lacy Gonzales MD on 7/31/2024 at 10:35 AM

## 2024-07-31 NOTE — PATIENT INSTRUCTIONS
SURVEY:    You may be receiving a survey from [facility] regarding your visit today.    Please complete the survey to enable us to provide the highest quality of care to you and your family.    If you cannot score us a very good (5 Stars) on any question, please call the office to discuss how we could have made your experience a very good one.    Thank you.    Clinical Care Team: MD Tino Britton LPN              Triage: Bryanna Scott CMA              Clerical Team: Bryanna Li

## 2024-08-02 ENCOUNTER — HOSPITAL ENCOUNTER (OUTPATIENT)
Age: 34
Setting detail: SPECIMEN
Discharge: HOME | End: 2024-08-02
Payer: COMMERCIAL

## 2024-08-02 DIAGNOSIS — N46.9 MALE INFERTILITY: ICD-10-CM

## 2024-08-02 LAB
AZOOSPERMATIC CONFIRMATION: ABNORMAL
PATHOLOGIST: ABNORMAL
PROGRESSIVE MOTILITY, SPERM: 40 %
SEMEN COLOR: ABNORMAL
SEMEN CONSISTENCY: ABNORMAL
SEMEN LIQUIFICATION TIME: NORMAL
SEMEN MORPHOLOGY: ABNORMAL % NORMAL
SEMEN OTHER: ABNORMAL /HPF
SEMEN PH: 8
SEMEN TEMPERATURE: ABNORMAL
SEMEN TUBIDITY: ABNORMAL
SEMEN VOLUME: 1 ML
SEMEN WBC: ABNORMAL HPF
SPERM CT, SMN: ABNORMAL MILLION/ML
SPERM MOT CHECK TIME: ABNORMAL

## 2024-08-02 PROCEDURE — 89398 UNLISTED REPROD MED LAB PROC: CPT

## 2024-08-02 PROCEDURE — 89320 SEMEN ANAL VOL/COUNT/MOT: CPT

## 2024-08-05 DIAGNOSIS — F90.2 ATTENTION DEFICIT HYPERACTIVITY DISORDER (ADHD), COMBINED TYPE: ICD-10-CM

## 2024-08-05 LAB
PROGRESSIVE MOTILITY, SPERM: 40 %
SEMEN COLOR: ABNORMAL
SEMEN CONSISTENCY: ABNORMAL
SEMEN LIQUIFICATION TIME: NORMAL
SEMEN MORPHOLOGY: ABNORMAL
SEMEN PH: 8
SEMEN TUBIDITY: ABNORMAL
SEMEN VOLUME: 1 ML
SPERM CT, SMN: ABNORMAL MILLION/ML

## 2024-08-05 RX ORDER — DEXTROAMPHETAMINE SACCHARATE, AMPHETAMINE ASPARTATE, DEXTROAMPHETAMINE SULFATE AND AMPHETAMINE SULFATE 5; 5; 5; 5 MG/1; MG/1; MG/1; MG/1
20 TABLET ORAL DAILY
Qty: 30 TABLET | Refills: 0 | Status: SHIPPED | OUTPATIENT
Start: 2024-08-05 | End: 2024-09-04

## 2024-08-07 ENCOUNTER — PATIENT MESSAGE (OUTPATIENT)
Dept: FAMILY MEDICINE CLINIC | Age: 34
End: 2024-08-07

## 2024-08-07 DIAGNOSIS — F41.9 ANXIETY: ICD-10-CM

## 2024-08-07 RX ORDER — ALPRAZOLAM 0.5 MG/1
0.5 TABLET ORAL NIGHTLY PRN
Qty: 30 TABLET | Refills: 2 | Status: SHIPPED | OUTPATIENT
Start: 2024-08-07 | End: 2024-11-05

## 2024-08-07 RX ORDER — ALPRAZOLAM 0.5 MG/1
0.5 TABLET ORAL NIGHTLY PRN
Qty: 30 TABLET | Refills: 2 | Status: CANCELLED | OUTPATIENT
Start: 2024-08-07 | End: 2024-11-05

## 2024-08-07 NOTE — TELEPHONE ENCOUNTER
Last visit:  7/31/2024  Next Visit Date:    Future Appointments   Date Time Provider Department Center   8/8/2024  9:15 AM Doug Baker PA-C [facility]   10/31/2024 10:00 AM Lacy Gonzales MD [facility]         Medication List:  Prior to Admission medications    Medication Sig Start Date End Date Taking? Authorizing Provider   amphetamine-dextroamphetamine (ADDERALL, 20MG,) 20 MG tablet Take 1 tablet by mouth daily for 30 days. Max Daily Amount: 20 mg 8/5/24 9/4/24  Suri Mckeon DO   amphetamine-dextroamphetamine (ADDERALL XR) 25 MG extended release capsule Take 1 capsule by mouth every morning for 31 days. Max Daily Amount: 25 mg 7/18/24 8/18/24  Lacy Gonzales MD   citalopram (CELEXA) 20 MG tablet take 1 tablet by mouth once daily 7/8/24   Lacy Gonzales MD   Omeprazole Magnesium (PRILOSEC OTC PO) Take by mouth daily    Aamir Jensen MD   magnesium oxide (MAG-OX) 400 MG tablet Take 0.25 tablets by mouth daily Pt takes 2 tablets daily    Aamir Jensen MD   vitamin B-12 (CYANOCOBALAMIN) 100 MCG tablet Take 0.5 tablets by mouth daily    Aamir Jensen MD   Cholecalciferol (VITAMIN D3) 5000 UNITS TABS Pt taking 2 tablet daily    Aamir Jensen MD

## 2024-08-07 NOTE — TELEPHONE ENCOUNTER
From: Jeff Oshea  To: Dr. Lacy Gonzales  Sent: 8/7/2024 8:48 AM EDT  Subject: Alprazolam 0.5MG Refill    Good Morning Dr. Lobato, I am ready to refill my Xanax prescription at this time. Thanks!

## 2024-08-08 ENCOUNTER — OFFICE VISIT (OUTPATIENT)
Dept: UROLOGY | Age: 34
End: 2024-08-08

## 2024-08-08 VITALS
BODY MASS INDEX: 36.43 KG/M2 | DIASTOLIC BLOOD PRESSURE: 98 MMHG | HEIGHT: 69 IN | WEIGHT: 246 LBS | SYSTOLIC BLOOD PRESSURE: 182 MMHG

## 2024-08-08 DIAGNOSIS — N46.9 MALE INFERTILITY: Primary | ICD-10-CM

## 2024-08-08 ASSESSMENT — ENCOUNTER SYMPTOMS
CONSTIPATION: 0
WHEEZING: 0
EYE REDNESS: 0
BACK PAIN: 0
NAUSEA: 0
COUGH: 0
SHORTNESS OF BREATH: 0
VOMITING: 0
COLOR CHANGE: 0
ABDOMINAL PAIN: 0

## 2024-08-08 NOTE — PROGRESS NOTES
tablet daily       No current facility-administered medications on file prior to visit.     Other  Family History   Problem Relation Age of Onset    Other Mother 58        had an MI    Arrhythmia Mother     Cancer Mother     Heart Attack Mother     High Cholesterol Father     High Blood Pressure Father     Obesity Father     Cancer Maternal Grandfather     Stroke Paternal Grandfather      Social History     Tobacco Use   Smoking Status Every Day    Types: E-Cigarettes    Passive exposure: Never   Smokeless Tobacco Former   Tobacco Comments    Chewing tobacco and e-cigarette vaping regularly       Social History     Substance and Sexual Activity   Alcohol Use Never       Review of Systems   Constitutional:  Negative for appetite change, chills and fever.   Eyes:  Negative for redness and visual disturbance.   Respiratory:  Negative for cough, shortness of breath and wheezing.    Cardiovascular:  Negative for chest pain and leg swelling.   Gastrointestinal:  Negative for abdominal pain, constipation, nausea and vomiting.   Genitourinary:  Negative for decreased urine volume, difficulty urinating, dysuria, enuresis, flank pain, frequency, hematuria, penile discharge, penile pain, scrotal swelling, testicular pain and urgency.   Musculoskeletal:  Negative for back pain, joint swelling and myalgias.   Skin:  Negative for color change, rash and wound.   Neurological:  Negative for dizziness, tremors and numbness.   Hematological:  Negative for adenopathy. Does not bruise/bleed easily.       BP (!) 182/98 (Site: Left Upper Arm, Position: Sitting, Cuff Size: Medium Adult)   Ht 1.753 m (5' 9.02\")   Wt 111.6 kg (246 lb)   BMI 36.31 kg/m²       PHYSICAL EXAM:  Constitutional: Patient in no acute distress;   Neuro: alert and oriented to person place and time.    Psych: Mood and affect normal.  Lungs: Respiratory effort normal  Abdomen: Soft, non-tender, non-distended     Lab Results   Component Value Date    BUN 17

## 2024-08-11 DIAGNOSIS — F41.9 ANXIETY: ICD-10-CM

## 2024-08-11 DIAGNOSIS — F90.2 ATTENTION DEFICIT HYPERACTIVITY DISORDER (ADHD), COMBINED TYPE: ICD-10-CM

## 2024-08-12 ENCOUNTER — TELEPHONE (OUTPATIENT)
Dept: FAMILY MEDICINE CLINIC | Age: 34
End: 2024-08-12

## 2024-08-12 DIAGNOSIS — F90.2 ATTENTION DEFICIT HYPERACTIVITY DISORDER (ADHD), COMBINED TYPE: ICD-10-CM

## 2024-08-12 DIAGNOSIS — F90.2 ATTENTION DEFICIT HYPERACTIVITY DISORDER (ADHD), COMBINED TYPE: Primary | ICD-10-CM

## 2024-08-12 RX ORDER — ALPRAZOLAM 0.5 MG
TABLET ORAL
Qty: 30 TABLET | OUTPATIENT
Start: 2024-08-12

## 2024-08-12 RX ORDER — DEXTROAMPHETAMINE SACCHARATE, AMPHETAMINE ASPARTATE MONOHYDRATE, DEXTROAMPHETAMINE SULFATE AND AMPHETAMINE SULFATE 6.25; 6.25; 6.25; 6.25 MG/1; MG/1; MG/1; MG/1
25 CAPSULE, EXTENDED RELEASE ORAL EVERY MORNING
Qty: 30 CAPSULE | Refills: 0 | Status: SHIPPED | OUTPATIENT
Start: 2024-08-12 | End: 2024-08-13 | Stop reason: RX

## 2024-08-12 RX ORDER — DEXTROAMPHETAMINE SACCHARATE, AMPHETAMINE ASPARTATE MONOHYDRATE, DEXTROAMPHETAMINE SULFATE AND AMPHETAMINE SULFATE 5; 5; 5; 5 MG/1; MG/1; MG/1; MG/1
1 CAPSULE, EXTENDED RELEASE ORAL EVERY MORNING
Qty: 30 CAPSULE | OUTPATIENT
Start: 2024-08-12

## 2024-08-12 NOTE — TELEPHONE ENCOUNTER
Last OV 7/31/24     Next OV 10/31/24    Requesting refill on adderall 25mg thru Mychart,  Rx pending

## 2024-08-12 NOTE — TELEPHONE ENCOUNTER
Adderall 25 mg was called in for Deangelo today. Drug [facility] does not have 25, they have 30 mg. He is wanting to know if that could be called in for him instead. He thinks increasing this 5 mg would help him out a little more anyway. Please let patient know.    DM-horace      Health Maintenance   Topic Date Due    Pneumococcal 0-64 years Vaccine (1 of 2 - PCV) 12/25/1995    Hepatitis B vaccine (1 of 3 - 19+ 3-dose series) Never done    COVID-19 Vaccine (6 - 2023-24 season) 09/01/2023    Flu vaccine (1) 08/01/2024    Depression Screen  01/24/2025    DTaP/Tdap/Td vaccine (2 - Td or Tdap) 08/11/2025    HIV screen  Completed    Hepatitis A vaccine  Aged Out    Hib vaccine  Aged Out    HPV vaccine  Aged Out    Polio vaccine  Aged Out    Meningococcal (ACWY) vaccine  Aged Out    Varicella vaccine  Discontinued    Hepatitis C screen  Discontinued             (applicable per patient's age: Cancer Screenings, Depression Screening, Fall Risk Screening, Immunizations)    AST (U/L)   Date Value   11/01/2021 17     ALT (U/L)   Date Value   11/01/2021 29     BUN (mg/dL)   Date Value   07/19/2023 17      (goal A1C is < 7)   (goal LDL is <100) need 30-50% reduction from baseline     BP Readings from Last 3 Encounters:   08/08/24 (!) 182/98   07/31/24 136/84   07/15/24 (!) 145/105    (goal /80)      All Future Testing planned in [facility]:  Lab Frequency Next Occurrence   Semen Morphology Once 09/08/2024       Next Visit Date:  Future Appointments   Date Time Provider Department Center   9/19/2024  8:45 AM Doug Baker PA-C [facility]   10/31/2024 10:00 AM Lacy Gonzales MD [facility]            Patient Active Problem List:     Anxiety     GERD (gastroesophageal reflux disease)     Seasonal allergies     Generalized anxiety disorder     Posttraumatic stress disorder

## 2024-08-13 RX ORDER — DEXTROAMPHETAMINE SACCHARATE, AMPHETAMINE ASPARTATE, DEXTROAMPHETAMINE SULFATE AND AMPHETAMINE SULFATE 7.5; 7.5; 7.5; 7.5 MG/1; MG/1; MG/1; MG/1
30 TABLET ORAL DAILY
COMMUNITY
End: 2024-08-13 | Stop reason: SDUPTHER

## 2024-08-13 RX ORDER — DEXTROAMPHETAMINE SACCHARATE, AMPHETAMINE ASPARTATE, DEXTROAMPHETAMINE SULFATE AND AMPHETAMINE SULFATE 7.5; 7.5; 7.5; 7.5 MG/1; MG/1; MG/1; MG/1
30 TABLET ORAL DAILY
Qty: 31 TABLET | Refills: 0 | Status: SHIPPED | OUTPATIENT
Start: 2024-08-13 | End: 2024-08-14 | Stop reason: ALTCHOICE

## 2024-08-14 ENCOUNTER — TELEPHONE (OUTPATIENT)
Dept: FAMILY MEDICINE CLINIC | Age: 34
End: 2024-08-14

## 2024-08-14 RX ORDER — DEXTROAMPHETAMINE SACCHARATE, AMPHETAMINE ASPARTATE MONOHYDRATE, DEXTROAMPHETAMINE SULFATE AND AMPHETAMINE SULFATE 7.5; 7.5; 7.5; 7.5 MG/1; MG/1; MG/1; MG/1
30 CAPSULE, EXTENDED RELEASE ORAL EVERY MORNING
COMMUNITY

## 2024-08-14 RX ORDER — DEXTROAMPHETAMINE SACCHARATE, AMPHETAMINE ASPARTATE MONOHYDRATE, DEXTROAMPHETAMINE SULFATE AND AMPHETAMINE SULFATE 7.5; 7.5; 7.5; 7.5 MG/1; MG/1; MG/1; MG/1
30 CAPSULE, EXTENDED RELEASE ORAL EVERY MORNING
Qty: 31 CAPSULE | Refills: 0 | Status: CANCELLED | OUTPATIENT
Start: 2024-08-14 | End: 2024-09-14

## 2024-08-14 NOTE — TELEPHONE ENCOUNTER
Ok to call DM and cancel the regular adderal 30mg and pend the adderall XR 30mg and ok for a kenalog 40mg IM for allergies

## 2024-08-14 NOTE — TELEPHONE ENCOUNTER
Patient notified that okay for kenalog shot for allergies.    [facility] to [facility] and new rx for adderall xr to be sent to drug [facility].     Spoke with Simi at drug [facility] and patient picked up adderall 30 mg yesterday so he will not be able to get new rx for 30 days.    Patient notified.

## 2024-08-14 NOTE — TELEPHONE ENCOUNTER
Adderall 30 mg XR    DM-horace    XR was not sent in and he said it needs to be. Can we send a new prescription in for him? He also would like to know if he can come in and get an allergy shot. Said his allergies are bothering him. Itchy/watery eyes.          Health Maintenance   Topic Date Due    Pneumococcal 0-64 years Vaccine (1 of 2 - PCV) 12/25/1995    Hepatitis B vaccine (1 of 3 - 19+ 3-dose series) Never done    COVID-19 Vaccine (6 - 2023-24 season) 09/01/2023    Flu vaccine (1) 08/01/2024    Depression Screen  01/24/2025    DTaP/Tdap/Td vaccine (2 - Td or Tdap) 08/11/2025    HIV screen  Completed    Hepatitis A vaccine  Aged Out    Hib vaccine  Aged Out    HPV vaccine  Aged Out    Polio vaccine  Aged Out    Meningococcal (ACWY) vaccine  Aged Out    Varicella vaccine  Discontinued    Hepatitis C screen  Discontinued             (applicable per patient's age: Cancer Screenings, Depression Screening, Fall Risk Screening, Immunizations)    AST (U/L)   Date Value   11/01/2021 17     ALT (U/L)   Date Value   11/01/2021 29     BUN (mg/dL)   Date Value   07/19/2023 17      (goal A1C is < 7)   (goal LDL is <100) need 30-50% reduction from baseline     BP Readings from Last 3 Encounters:   08/08/24 (!) 182/98   07/31/24 136/84   07/15/24 (!) 145/105    (goal /80)      All Future Testing planned in [facility]:  Lab Frequency Next Occurrence   Semen Morphology Once 09/08/2024       Next Visit Date:  Future Appointments   Date Time Provider Department Center   9/19/2024  8:45 AM Doug Baker PA-C [facility]   10/31/2024 10:00 AM Lacy Gonzales MD [facility]            Patient Active Problem List:     Anxiety     GERD (gastroesophageal reflux disease)     Seasonal allergies     Generalized anxiety disorder     Posttraumatic stress disorder

## 2024-08-15 ENCOUNTER — LAB (OUTPATIENT)
Dept: FAMILY MEDICINE CLINIC | Age: 34
End: 2024-08-15
Payer: COMMERCIAL

## 2024-08-15 ENCOUNTER — TELEPHONE (OUTPATIENT)
Dept: FAMILY MEDICINE CLINIC | Age: 34
End: 2024-08-15

## 2024-08-15 DIAGNOSIS — J30.2 SEASONAL ALLERGIES: Primary | ICD-10-CM

## 2024-08-15 PROCEDURE — 96372 THER/PROPH/DIAG INJ SC/IM: CPT | Performed by: FAMILY MEDICINE

## 2024-08-15 RX ORDER — TRIAMCINOLONE ACETONIDE 40 MG/ML
40 INJECTION, SUSPENSION INTRA-ARTICULAR; INTRAMUSCULAR ONCE
Status: COMPLETED | OUTPATIENT
Start: 2024-08-15 | End: 2024-08-15

## 2024-08-15 RX ADMIN — TRIAMCINOLONE ACETONIDE 40 MG: 40 INJECTION, SUSPENSION INTRA-ARTICULAR; INTRAMUSCULAR at 10:17

## 2024-08-15 NOTE — TELEPHONE ENCOUNTER
Pt would like to know if he bring in is Adderall can the office to dispose of. Could provider send a new script? Please advise

## 2024-08-15 NOTE — TELEPHONE ENCOUNTER
Please tell pt I am sorry but unfortunately since it is a controlled substance they can not refill for 30d since he picked it up. It is a higher dose but I know not extended release but he is still taking the 20mg dose to get him through the day which should help. We can go back to the 30mg XR in when the 30d are up.

## 2024-09-01 DIAGNOSIS — F90.2 ATTENTION DEFICIT HYPERACTIVITY DISORDER (ADHD), COMBINED TYPE: ICD-10-CM

## 2024-09-03 RX ORDER — DEXTROAMPHETAMINE SACCHARATE, AMPHETAMINE ASPARTATE, DEXTROAMPHETAMINE SULFATE AND AMPHETAMINE SULFATE 5; 5; 5; 5 MG/1; MG/1; MG/1; MG/1
20 TABLET ORAL DAILY
Qty: 30 TABLET | Refills: 0 | Status: SHIPPED | OUTPATIENT
Start: 2024-09-03 | End: 2024-10-03

## 2024-09-03 NOTE — TELEPHONE ENCOUNTER
Last OV 7/31/24     Next OV 10/31/24    Requesting refill on adderall 20mg thru Mychart.  Rx pending

## 2024-09-12 ENCOUNTER — HOSPITAL ENCOUNTER (OUTPATIENT)
Age: 34
Discharge: HOME | End: 2024-09-12
Payer: COMMERCIAL

## 2024-09-12 LAB
AZOOSPERMATIC CONFIRMATION: NORMAL
PATHOLOGIST: NORMAL
PROGRESSIVE MOTILITY, SPERM: 50 %
SEMEN COLOR: NORMAL
SEMEN CONSISTENCY: NORMAL
SEMEN LIQUIFICATION TIME: NORMAL
SEMEN MORPHOLOGY: NORMAL % NORMAL
SEMEN OTHER: NORMAL /HPF
SEMEN PH: 8
SEMEN TEMPERATURE: NORMAL
SEMEN TUBIDITY: NORMAL
SEMEN VOLUME: 2 ML
SEMEN WBC: NORMAL HPF
SPERM CT, SMN: NORMAL MILLION/ML
SPERM MOT CHECK TIME: NORMAL

## 2024-09-12 PROCEDURE — 89320 SEMEN ANAL VOL/COUNT/MOT: CPT

## 2024-09-16 LAB
PROGRESSIVE MOTILITY, SPERM: 50 %
SEMEN COLOR: ABNORMAL
SEMEN CONSISTENCY: ABNORMAL
SEMEN LIQUIFICATION TIME: NORMAL
SEMEN MORPHOLOGY: ABNORMAL
SEMEN PH: 8
SEMEN TUBIDITY: ABNORMAL
SEMEN VOLUME: 2 ML
SPERM CT, SMN: 14 MILLION/ML

## 2024-09-18 LAB — SEMEN MORPHOLOGY: NORMAL

## 2024-09-19 ENCOUNTER — OFFICE VISIT (OUTPATIENT)
Dept: UROLOGY | Age: 34
End: 2024-09-19

## 2024-09-19 VITALS
WEIGHT: 248 LBS | HEIGHT: 69 IN | BODY MASS INDEX: 36.73 KG/M2 | SYSTOLIC BLOOD PRESSURE: 136 MMHG | DIASTOLIC BLOOD PRESSURE: 88 MMHG

## 2024-09-19 DIAGNOSIS — N46.9 MALE INFERTILITY: Primary | ICD-10-CM

## 2024-09-30 DIAGNOSIS — F90.2 ATTENTION DEFICIT HYPERACTIVITY DISORDER (ADHD), COMBINED TYPE: ICD-10-CM

## 2024-09-30 RX ORDER — DEXTROAMPHETAMINE SACCHARATE, AMPHETAMINE ASPARTATE, DEXTROAMPHETAMINE SULFATE AND AMPHETAMINE SULFATE 5; 5; 5; 5 MG/1; MG/1; MG/1; MG/1
20 TABLET ORAL DAILY
Qty: 30 TABLET | Refills: 0 | Status: SHIPPED | OUTPATIENT
Start: 2024-09-30 | End: 2024-10-30

## 2024-10-08 DIAGNOSIS — F90.2 ATTENTION DEFICIT HYPERACTIVITY DISORDER (ADHD), COMBINED TYPE: ICD-10-CM

## 2024-10-08 RX ORDER — DEXTROAMPHETAMINE SACCHARATE, AMPHETAMINE ASPARTATE MONOHYDRATE, DEXTROAMPHETAMINE SULFATE AND AMPHETAMINE SULFATE 7.5; 7.5; 7.5; 7.5 MG/1; MG/1; MG/1; MG/1
30 CAPSULE, EXTENDED RELEASE ORAL DAILY
Qty: 30 CAPSULE | Refills: 0 | Status: SHIPPED | OUTPATIENT
Start: 2024-10-08 | End: 2024-11-07

## 2024-10-23 DIAGNOSIS — F41.9 ANXIETY: ICD-10-CM

## 2024-10-23 RX ORDER — ALPRAZOLAM 0.5 MG
TABLET ORAL
Qty: 30 TABLET | Refills: 2 | Status: SHIPPED | OUTPATIENT
Start: 2024-10-23 | End: 2025-01-21

## 2024-10-23 NOTE — TELEPHONE ENCOUNTER
Rx refill request via [facility]  Alprazolam 0.5mg qd prn  Last OV for anxiety 7-31-24  Next appt- 10-31-24

## 2024-10-28 DIAGNOSIS — F90.2 ATTENTION DEFICIT HYPERACTIVITY DISORDER (ADHD), COMBINED TYPE: ICD-10-CM

## 2024-10-28 RX ORDER — DEXTROAMPHETAMINE SACCHARATE, AMPHETAMINE ASPARTATE, DEXTROAMPHETAMINE SULFATE AND AMPHETAMINE SULFATE 5; 5; 5; 5 MG/1; MG/1; MG/1; MG/1
20 TABLET ORAL DAILY
Qty: 30 TABLET | Refills: 0 | Status: SHIPPED | OUTPATIENT
Start: 2024-10-28 | End: 2024-11-27

## 2024-10-31 ENCOUNTER — OFFICE VISIT (OUTPATIENT)
Dept: FAMILY MEDICINE CLINIC | Age: 34
End: 2024-10-31

## 2024-10-31 VITALS
HEART RATE: 88 BPM | OXYGEN SATURATION: 98 % | WEIGHT: 250 LBS | DIASTOLIC BLOOD PRESSURE: 76 MMHG | BODY MASS INDEX: 36.92 KG/M2 | SYSTOLIC BLOOD PRESSURE: 124 MMHG

## 2024-10-31 DIAGNOSIS — F41.9 ANXIETY: ICD-10-CM

## 2024-10-31 DIAGNOSIS — F90.2 ATTENTION DEFICIT HYPERACTIVITY DISORDER (ADHD), COMBINED TYPE: Primary | ICD-10-CM

## 2024-10-31 ASSESSMENT — ENCOUNTER SYMPTOMS
DIARRHEA: 0
SORE THROAT: 0
EYE REDNESS: 0
CHANGE IN BOWEL HABIT: 0
VOMITING: 0
SHORTNESS OF BREATH: 0
BLOOD IN STOOL: 0
TROUBLE SWALLOWING: 0
COUGH: 0
ABDOMINAL PAIN: 0
NAUSEA: 0
EYE DISCHARGE: 0

## 2024-10-31 NOTE — PROGRESS NOTES
HPI Notes    Name: Jeff Oshea  : 1990        Chief Complaint:     Chief Complaint   Patient presents with    ADHD     3 month follow up    Mental Health Problem     3 month for anxiety. Taking citalopram daily and xanax as needed       History of Present Illness:     Jeff Oshea is a 33 y.o.  male who presents with ADHD (3 month follow up) and Mental Health Problem (3 month for anxiety. Taking citalopram daily and xanax as needed)      ADHD  This is a chronic problem. The current episode started more than 1 year ago (Pt is doing well on the Adderall). The problem has been unchanged (pt states the adderall makes task seem \"less daunting\" and helps with his anxiety. The adderall is doing well for him as he keeps better focused at work and remodeling his home.). Pertinent negatives include no abdominal pain, change in bowel habit, chest pain, chills, congestion, coughing, fatigue, fever, headaches, nausea, rash, sore throat or vomiting. Treatments tried: adderall XR 30mg  and adderall 20mg are good doses for pt.   Mental Health Problem  The primary symptoms do not include dysphoric mood, delusions or hallucinations. Primary symptoms comment: pt is stable on the celexa daily for anxiety. Pt also takes the xanax as needed. The current episode started more than 1 month ago. This is a chronic problem.   The degree of incapacity that he is experiencing as a consequence of his illness is mild. Additional symptoms of the illness do not include insomnia, appetite change, fatigue, headaches or abdominal pain. He does not admit to suicidal ideas. He does not have a plan to attempt suicide. He does not contemplate harming themself. He has not already injured self. He does not contemplate injuring another person. He has not already  injured another person.       Past Medical History:     Past Medical History:   Diagnosis Date    Anxiety     Heart palpitations     Hypertension       Reviewed all

## 2024-11-05 DIAGNOSIS — F90.2 ATTENTION DEFICIT HYPERACTIVITY DISORDER (ADHD), COMBINED TYPE: ICD-10-CM

## 2024-11-05 RX ORDER — DEXTROAMPHETAMINE SACCHARATE, AMPHETAMINE ASPARTATE MONOHYDRATE, DEXTROAMPHETAMINE SULFATE AND AMPHETAMINE SULFATE 7.5; 7.5; 7.5; 7.5 MG/1; MG/1; MG/1; MG/1
30 CAPSULE, EXTENDED RELEASE ORAL DAILY
Qty: 30 CAPSULE | Refills: 0 | Status: SHIPPED | OUTPATIENT
Start: 2024-11-05 | End: 2024-12-05

## 2024-11-05 NOTE — TELEPHONE ENCOUNTER
Last OV: 10/31/2024  ADHD   Last RX:    Next scheduled apt: 2/3/2025   3 months anxiety and ADHD         Pt requesting a refill   Medication pending for approval

## 2024-11-25 DIAGNOSIS — F90.2 ATTENTION DEFICIT HYPERACTIVITY DISORDER (ADHD), COMBINED TYPE: ICD-10-CM

## 2024-11-25 RX ORDER — DEXTROAMPHETAMINE SACCHARATE, AMPHETAMINE ASPARTATE, DEXTROAMPHETAMINE SULFATE AND AMPHETAMINE SULFATE 5; 5; 5; 5 MG/1; MG/1; MG/1; MG/1
20 TABLET ORAL DAILY
Qty: 30 TABLET | Refills: 0 | Status: SHIPPED | OUTPATIENT
Start: 2024-11-25 | End: 2024-12-25

## 2024-12-02 DIAGNOSIS — F90.2 ATTENTION DEFICIT HYPERACTIVITY DISORDER (ADHD), COMBINED TYPE: ICD-10-CM

## 2024-12-02 RX ORDER — DEXTROAMPHETAMINE SACCHARATE, AMPHETAMINE ASPARTATE MONOHYDRATE, DEXTROAMPHETAMINE SULFATE AND AMPHETAMINE SULFATE 5; 5; 5; 5 MG/1; MG/1; MG/1; MG/1
20 CAPSULE, EXTENDED RELEASE ORAL DAILY
Qty: 30 CAPSULE | Refills: 0 | Status: SHIPPED | OUTPATIENT
Start: 2024-12-02 | End: 2025-01-01

## 2024-12-02 RX ORDER — DEXTROAMPHETAMINE SACCHARATE, AMPHETAMINE ASPARTATE MONOHYDRATE, DEXTROAMPHETAMINE SULFATE AND AMPHETAMINE SULFATE 7.5; 7.5; 7.5; 7.5 MG/1; MG/1; MG/1; MG/1
30 CAPSULE, EXTENDED RELEASE ORAL DAILY
Qty: 30 CAPSULE | Refills: 0 | Status: CANCELLED | OUTPATIENT
Start: 2024-12-02 | End: 2025-01-01

## 2024-12-02 NOTE — TELEPHONE ENCOUNTER
Please call pt to qualify as he is due for his adderall 30mg XR in am and he wants this dose decreased??  He should also have the adderall 20mg (regular) to take in PM

## 2024-12-02 NOTE — TELEPHONE ENCOUNTER
Requesting adderall refill, was this supposed to be 20 mg XR?      Last visit:  10/31/2024  Next Visit Date:    Future Appointments   Date Time Provider Department Center   2/3/2025  9:40 AM Lacy Gonzales MD [facility] DEP         Medication List:  Prior to Admission medications    Medication Sig Start Date End Date Taking? Authorizing Provider   amphetamine-dextroamphetamine (ADDERALL, 20MG,) 20 MG tablet Take 1 tablet by mouth daily for 30 days. Max Daily Amount: 20 mg 11/25/24 12/25/24  Lacy Gonzales MD   amphetamine-dextroamphetamine (ADDERALL XR) 30 MG extended release capsule Take 1 capsule by mouth daily for 30 days. Max Daily Amount: 30 mg 11/5/24 12/5/24  Lacy Gonzales MD   Coenzyme Q10 (COQ-10 PO) Take by mouth daily    Aamir Jensen MD   ALPRAZolam (XANAX) 0.5 MG tablet TAKE 1 TABLET BY MOUTH NIGHTLY AS NEEDED for sleep or anxiety **max daily amount is 0.5 mg** 10/23/24 1/21/25  Lacy Gonzales MD   citalopram (CELEXA) 20 MG tablet take 1 tablet by mouth once daily 7/8/24   Lacy Gonzales MD   Omeprazole Magnesium (PRILOSEC OTC PO) Take by mouth daily    Aamir Jensen MD   magnesium oxide (MAG-OX) 400 MG tablet Take 0.25 tablets by mouth daily Pt takes 2 tablets daily    Aamir Jensen MD   vitamin B-12 (CYANOCOBALAMIN) 100 MCG tablet Take 0.5 tablets by mouth daily    Aamir Jensen MD   Cholecalciferol (VITAMIN D3) 5000 UNITS TABS Pt taking 2 tablet daily    Aamir Jensen MD

## 2024-12-17 ENCOUNTER — PATIENT MESSAGE (OUTPATIENT)
Dept: FAMILY MEDICINE CLINIC | Age: 34
End: 2024-12-17

## 2024-12-17 DIAGNOSIS — F90.2 ATTENTION DEFICIT HYPERACTIVITY DISORDER (ADHD), COMBINED TYPE: ICD-10-CM

## 2024-12-17 RX ORDER — DEXTROAMPHETAMINE SACCHARATE, AMPHETAMINE ASPARTATE, DEXTROAMPHETAMINE SULFATE AND AMPHETAMINE SULFATE 5; 5; 5; 5 MG/1; MG/1; MG/1; MG/1
TABLET ORAL
Qty: 30 TABLET | Refills: 0 | Status: SHIPPED | OUTPATIENT
Start: 2024-12-17 | End: 2025-01-16

## 2024-12-21 DIAGNOSIS — F41.9 ANXIETY: ICD-10-CM

## 2024-12-23 RX ORDER — CITALOPRAM HYDROBROMIDE 20 MG/1
TABLET ORAL
Qty: 90 TABLET | Refills: 1 | Status: SHIPPED | OUTPATIENT
Start: 2024-12-23

## 2024-12-31 DIAGNOSIS — F90.2 ATTENTION DEFICIT HYPERACTIVITY DISORDER (ADHD), COMBINED TYPE: ICD-10-CM

## 2024-12-31 RX ORDER — DEXTROAMPHETAMINE SACCHARATE, AMPHETAMINE ASPARTATE MONOHYDRATE, DEXTROAMPHETAMINE SULFATE AND AMPHETAMINE SULFATE 5; 5; 5; 5 MG/1; MG/1; MG/1; MG/1
20 CAPSULE, EXTENDED RELEASE ORAL DAILY
Qty: 30 CAPSULE | Refills: 0 | Status: SHIPPED | OUTPATIENT
Start: 2024-12-31 | End: 2025-01-30

## 2025-01-19 DIAGNOSIS — F41.9 ANXIETY: ICD-10-CM

## 2025-01-20 DIAGNOSIS — F90.2 ATTENTION DEFICIT HYPERACTIVITY DISORDER (ADHD), COMBINED TYPE: ICD-10-CM

## 2025-01-20 RX ORDER — ALPRAZOLAM 0.5 MG
TABLET ORAL
Qty: 30 TABLET | Refills: 2 | Status: SHIPPED | OUTPATIENT
Start: 2025-01-20 | End: 2025-04-20

## 2025-01-20 RX ORDER — DEXTROAMPHETAMINE SACCHARATE, AMPHETAMINE ASPARTATE, DEXTROAMPHETAMINE SULFATE AND AMPHETAMINE SULFATE 5; 5; 5; 5 MG/1; MG/1; MG/1; MG/1
TABLET ORAL
Qty: 30 TABLET | Refills: 0 | Status: SHIPPED | OUTPATIENT
Start: 2025-01-20 | End: 2025-02-19

## 2025-01-29 DIAGNOSIS — F90.2 ATTENTION DEFICIT HYPERACTIVITY DISORDER (ADHD), COMBINED TYPE: ICD-10-CM

## 2025-01-29 RX ORDER — DEXTROAMPHETAMINE SACCHARATE, AMPHETAMINE ASPARTATE MONOHYDRATE, DEXTROAMPHETAMINE SULFATE AND AMPHETAMINE SULFATE 5; 5; 5; 5 MG/1; MG/1; MG/1; MG/1
20 CAPSULE, EXTENDED RELEASE ORAL DAILY
Qty: 30 CAPSULE | Refills: 0 | Status: SHIPPED | OUTPATIENT
Start: 2025-01-29 | End: 2025-02-28

## 2025-01-29 NOTE — TELEPHONE ENCOUNTER
Rx refill request via [employer]  Adderall XR 20mg qd  Last OV for ADHD 10-31-24  Next appt- 2-3-25

## 2025-01-31 SDOH — ECONOMIC STABILITY: FOOD INSECURITY: WITHIN THE PAST 12 MONTHS, YOU WORRIED THAT YOUR FOOD WOULD RUN OUT BEFORE YOU GOT MONEY TO BUY MORE.: NEVER TRUE

## 2025-01-31 SDOH — ECONOMIC STABILITY: INCOME INSECURITY: IN THE LAST 12 MONTHS, WAS THERE A TIME WHEN YOU WERE NOT ABLE TO PAY THE MORTGAGE OR RENT ON TIME?: NO

## 2025-01-31 SDOH — ECONOMIC STABILITY: FOOD INSECURITY: WITHIN THE PAST 12 MONTHS, THE FOOD YOU BOUGHT JUST DIDN'T LAST AND YOU DIDN'T HAVE MONEY TO GET MORE.: NEVER TRUE

## 2025-01-31 SDOH — ECONOMIC STABILITY: TRANSPORTATION INSECURITY
IN THE PAST 12 MONTHS, HAS THE LACK OF TRANSPORTATION KEPT YOU FROM MEDICAL APPOINTMENTS OR FROM GETTING MEDICATIONS?: NO

## 2025-01-31 ASSESSMENT — PATIENT HEALTH QUESTIONNAIRE - PHQ9
SUM OF ALL RESPONSES TO PHQ9 QUESTIONS 1 & 2: 0
1. LITTLE INTEREST OR PLEASURE IN DOING THINGS: NOT AT ALL
2. FEELING DOWN, DEPRESSED OR HOPELESS: NOT AT ALL
SUM OF ALL RESPONSES TO PHQ QUESTIONS 1-9: 0
1. LITTLE INTEREST OR PLEASURE IN DOING THINGS: NOT AT ALL
SUM OF ALL RESPONSES TO PHQ QUESTIONS 1-9: 0
SUM OF ALL RESPONSES TO PHQ9 QUESTIONS 1 & 2: 0
2. FEELING DOWN, DEPRESSED OR HOPELESS: NOT AT ALL

## 2025-02-03 ENCOUNTER — OFFICE VISIT (OUTPATIENT)
Dept: FAMILY MEDICINE CLINIC | Age: 35
End: 2025-02-03
Payer: COMMERCIAL

## 2025-02-03 VITALS
OXYGEN SATURATION: 98 % | SYSTOLIC BLOOD PRESSURE: 138 MMHG | WEIGHT: 250 LBS | DIASTOLIC BLOOD PRESSURE: 88 MMHG | BODY MASS INDEX: 36.92 KG/M2

## 2025-02-03 DIAGNOSIS — F90.2 ATTENTION DEFICIT HYPERACTIVITY DISORDER (ADHD), COMBINED TYPE: Primary | ICD-10-CM

## 2025-02-03 DIAGNOSIS — F41.9 ANXIETY: ICD-10-CM

## 2025-02-03 DIAGNOSIS — M79.645 PAIN OF LEFT THUMB: ICD-10-CM

## 2025-02-03 PROCEDURE — 99213 OFFICE O/P EST LOW 20 MIN: CPT | Performed by: FAMILY MEDICINE

## 2025-02-03 ASSESSMENT — ENCOUNTER SYMPTOMS
SHORTNESS OF BREATH: 0
VOMITING: 0
CHANGE IN BOWEL HABIT: 0
ABDOMINAL PAIN: 0
EYE DISCHARGE: 0
EYE REDNESS: 0
COUGH: 0

## 2025-02-03 NOTE — PROGRESS NOTES
HPI Notes    Name: Jeff Oshea  : 1990        Chief Complaint:     Chief Complaint   Patient presents with    ADHD     3 month follow up    Mental Health Problem    Hand Pain     Patient complains of left thumb pain. Started about 2 months ago.       History of Present Illness:     Jeff Oshea is a 34 y.o.  male who presents with ADHD (3 month follow up), Mental Health Problem, and Hand Pain (Patient complains of left thumb pain. Started about 2 months ago.)      ADHD  This is a chronic problem. The current episode started more than 1 year ago. The problem occurs daily. The problem has been unchanged (pt is doing ok. Focus is ok on the current medication.). Associated symptoms include arthralgias. Pertinent negatives include no abdominal pain, change in bowel habit, chest pain, chills, congestion, coughing, fever, numbness, rash or vomiting. Treatments tried: Adderall.   Mental Health Problem  The primary symptoms do not include dysphoric mood, delusions, hallucinations or disorganized speech. The current episode started more than 1 month ago.   The onset of the illness is precipitated by a stressful event (pt still has stress finishing his total house remodel/renovation). Additional symptoms of the illness do not include abdominal pain.   Hand Pain   There was no injury mechanism (No known injury. Lt thumb pain started 2mos ago.). Pain location: Lt thumb ----  had a ganglion cyst removed from base of Lt thumb. The quality of the pain is described as aching. The pain does not radiate. Pertinent negatives include no chest pain, numbness or tingling.   Pt is mainly Rt handed but does use his Lt hand too and doing house remodeling.      Past Medical History:     Past Medical History:   Diagnosis Date    Anxiety     Heart palpitations     Hypertension       Reviewed all health maintenance requirements and ordered appropriate tests  Health Maintenance Due   Topic Date Due

## 2025-02-03 NOTE — PATIENT INSTRUCTIONS
SURVEY:    You may be receiving a survey from [facility] [place] regarding your visit today.    Please complete the survey to enable us to provide the highest quality of care to you and your family.    If you cannot score us a very good (5 Stars) on any question, please call the office to discuss how we could have made your experience a very good one.    Thank you.    Clinical Care Team: MD Tino Britton LPN              Triage: Bryanna Scott CMA              Clerical Team: Bryanna Li

## 2025-02-17 DIAGNOSIS — F90.2 ATTENTION DEFICIT HYPERACTIVITY DISORDER (ADHD), COMBINED TYPE: ICD-10-CM

## 2025-02-17 RX ORDER — DEXTROAMPHETAMINE SACCHARATE, AMPHETAMINE ASPARTATE, DEXTROAMPHETAMINE SULFATE AND AMPHETAMINE SULFATE 5; 5; 5; 5 MG/1; MG/1; MG/1; MG/1
TABLET ORAL
Qty: 30 TABLET | Refills: 0 | Status: SHIPPED | OUTPATIENT
Start: 2025-02-17 | End: 2025-03-19

## 2025-02-26 DIAGNOSIS — F90.2 ATTENTION DEFICIT HYPERACTIVITY DISORDER (ADHD), COMBINED TYPE: ICD-10-CM

## 2025-02-27 RX ORDER — DEXTROAMPHETAMINE SACCHARATE, AMPHETAMINE ASPARTATE MONOHYDRATE, DEXTROAMPHETAMINE SULFATE AND AMPHETAMINE SULFATE 5; 5; 5; 5 MG/1; MG/1; MG/1; MG/1
20 CAPSULE, EXTENDED RELEASE ORAL DAILY
Qty: 30 CAPSULE | Refills: 0 | Status: SHIPPED | OUTPATIENT
Start: 2025-02-27 | End: 2025-03-29

## 2025-03-17 ENCOUNTER — APPOINTMENT (OUTPATIENT)
Dept: ENDOCRINOLOGY | Facility: CLINIC | Age: 35
End: 2025-03-17
Payer: COMMERCIAL

## 2025-03-17 DIAGNOSIS — F90.2 ATTENTION DEFICIT HYPERACTIVITY DISORDER (ADHD), COMBINED TYPE: ICD-10-CM

## 2025-03-17 RX ORDER — DEXTROAMPHETAMINE SACCHARATE, AMPHETAMINE ASPARTATE, DEXTROAMPHETAMINE SULFATE AND AMPHETAMINE SULFATE 5; 5; 5; 5 MG/1; MG/1; MG/1; MG/1
TABLET ORAL
Qty: 30 TABLET | Refills: 0 | Status: SHIPPED | OUTPATIENT
Start: 2025-03-17 | End: 2025-04-16

## 2025-03-26 DIAGNOSIS — F90.2 ATTENTION DEFICIT HYPERACTIVITY DISORDER (ADHD), COMBINED TYPE: ICD-10-CM

## 2025-03-26 RX ORDER — DEXTROAMPHETAMINE SACCHARATE, AMPHETAMINE ASPARTATE MONOHYDRATE, DEXTROAMPHETAMINE SULFATE AND AMPHETAMINE SULFATE 5; 5; 5; 5 MG/1; MG/1; MG/1; MG/1
20 CAPSULE, EXTENDED RELEASE ORAL DAILY
Qty: 30 CAPSULE | Refills: 0 | Status: CANCELLED | OUTPATIENT
Start: 2025-03-26 | End: 2025-04-25

## 2025-03-27 RX ORDER — DEXTROAMPHETAMINE SULFATE, DEXTROAMPHETAMINE SACCHARATE, AMPHETAMINE SULFATE AND AMPHETAMINE ASPARTATE 5; 5; 5; 5 MG/1; MG/1; MG/1; MG/1
20 CAPSULE, EXTENDED RELEASE ORAL DAILY
Qty: 30 CAPSULE | Refills: 0 | Status: SHIPPED | OUTPATIENT
Start: 2025-03-27 | End: 2025-04-26

## 2025-04-14 ENCOUNTER — PATIENT MESSAGE (OUTPATIENT)
Dept: FAMILY MEDICINE CLINIC | Age: 35
End: 2025-04-14

## 2025-04-14 DIAGNOSIS — F90.2 ATTENTION DEFICIT HYPERACTIVITY DISORDER (ADHD), COMBINED TYPE: ICD-10-CM

## 2025-04-14 RX ORDER — DEXTROAMPHETAMINE SACCHARATE, AMPHETAMINE ASPARTATE MONOHYDRATE, DEXTROAMPHETAMINE SULFATE AND AMPHETAMINE SULFATE 5; 5; 5; 5 MG/1; MG/1; MG/1; MG/1
20 CAPSULE, EXTENDED RELEASE ORAL DAILY
Qty: 30 CAPSULE | Refills: 0 | Status: SHIPPED | OUTPATIENT
Start: 2025-04-14 | End: 2025-05-14

## 2025-04-15 RX ORDER — DEXTROAMPHETAMINE SACCHARATE, AMPHETAMINE ASPARTATE, DEXTROAMPHETAMINE SULFATE AND AMPHETAMINE SULFATE 5; 5; 5; 5 MG/1; MG/1; MG/1; MG/1
TABLET ORAL
Qty: 30 TABLET | Refills: 0 | Status: SHIPPED | OUTPATIENT
Start: 2025-04-15 | End: 2025-05-15

## 2025-04-23 ENCOUNTER — PATIENT MESSAGE (OUTPATIENT)
Dept: FAMILY MEDICINE CLINIC | Age: 35
End: 2025-04-23

## 2025-04-23 ENCOUNTER — OFFICE VISIT (OUTPATIENT)
Dept: FAMILY MEDICINE CLINIC | Age: 35
End: 2025-04-23
Payer: COMMERCIAL

## 2025-04-23 VITALS
HEART RATE: 88 BPM | BODY MASS INDEX: 37.07 KG/M2 | WEIGHT: 251 LBS | SYSTOLIC BLOOD PRESSURE: 142 MMHG | DIASTOLIC BLOOD PRESSURE: 92 MMHG | OXYGEN SATURATION: 96 %

## 2025-04-23 DIAGNOSIS — F90.2 ATTENTION DEFICIT HYPERACTIVITY DISORDER (ADHD), COMBINED TYPE: ICD-10-CM

## 2025-04-23 DIAGNOSIS — F41.9 ANXIETY: Primary | ICD-10-CM

## 2025-04-23 PROCEDURE — 99213 OFFICE O/P EST LOW 20 MIN: CPT | Performed by: FAMILY MEDICINE

## 2025-04-23 RX ORDER — ALPRAZOLAM 0.5 MG
0.5 TABLET ORAL NIGHTLY PRN
COMMUNITY
Start: 2025-03-27 | End: 2025-04-23 | Stop reason: SDUPTHER

## 2025-04-23 RX ORDER — ALPRAZOLAM 0.5 MG
0.5 TABLET ORAL NIGHTLY PRN
Qty: 30 TABLET | Refills: 2 | Status: SHIPPED | OUTPATIENT
Start: 2025-04-23 | End: 2025-07-22

## 2025-04-23 RX ORDER — DEXTROAMPHETAMINE SACCHARATE, AMPHETAMINE ASPARTATE MONOHYDRATE, DEXTROAMPHETAMINE SULFATE AND AMPHETAMINE SULFATE 7.5; 7.5; 7.5; 7.5 MG/1; MG/1; MG/1; MG/1
30 CAPSULE, EXTENDED RELEASE ORAL DAILY
Qty: 30 CAPSULE | Refills: 0 | Status: SHIPPED | OUTPATIENT
Start: 2025-04-23 | End: 2025-05-23

## 2025-04-23 ASSESSMENT — ENCOUNTER SYMPTOMS
CHANGE IN BOWEL HABIT: 0
COUGH: 0
EYE REDNESS: 0
WHEEZING: 0
SORE THROAT: 0
DIARRHEA: 0
EYE DISCHARGE: 0
VOMITING: 0

## 2025-04-23 NOTE — PROGRESS NOTES
HPI Notes    Name: Jeff Oshea  : 1990        Chief Complaint:     Chief Complaint   Patient presents with    ADHD     Discuss ADHD medication. Taking adderall xr 20 mg every am, usually around 6:00 am, adderall 20mg early afternoon.       History of Present Illness:     Jeff Oshea is a 34 y.o.  male who presents with ADHD (Discuss ADHD medication. Taking adderall xr 20 mg every am, usually around 6:00 am, adderall 20mg early afternoon.)      ADHD  This is a chronic problem. The current episode started more than 1 year ago. The problem occurs daily. Progression since onset: pt states he is doing well in the AM adderall that is extended release but the after noon dose is not seeming to work as well since NOT an extended release dose. SO pt would like to STOP the afternoon addrerall and increase the AM extended release dose. Pertinent negatives include no change in bowel habit, chest pain, coughing, fever, rash, sore throat or vomiting. The symptoms are aggravated by stress (BUt pt feels some stressors have been decreased --- since  and his total renovation of their house is completed.). Treatments tried: Adderral.       Past Medical History:     Past Medical History:   Diagnosis Date    Anxiety     Heart palpitations     Hypertension       Reviewed all health maintenance requirements and ordered appropriate tests  Health Maintenance Due   Topic Date Due    Hepatitis B vaccine (1 of 3 - 19+ 3-dose series) Never done    Pneumococcal 0-49 years Vaccine (1 of 2 - PCV) 2008    COVID-19 Vaccine (2024-25 season) 2024       Past Surgical History:     Past Surgical History:   Procedure Laterality Date    CYST REMOVAL  2010    left wrist        Medications:       Prior to Admission medications    Medication Sig Start Date End Date Taking? Authorizing Provider   ALPRAZolam (XANAX) 0.5 MG tablet Take 1 tablet by mouth nightly as needed. 3/27/25  Yes Provider, Historical,

## 2025-04-23 NOTE — PATIENT INSTRUCTIONS
SURVEY:    You may be receiving a survey from [facility] [place] regarding your visit today.    Please complete the survey to enable us to provide the highest quality of care to you and your family.    If you cannot score us a very good (5 Stars) on any question, please call the office to discuss how we could have made your experience a very good one.    Thank you.    Clinical Care Team: MD Tino Britton LPN              Triage: Bryanna Scott CMA              Clerical Team: Bryanna Li

## 2025-05-21 DIAGNOSIS — F90.2 ATTENTION DEFICIT HYPERACTIVITY DISORDER (ADHD), COMBINED TYPE: ICD-10-CM

## 2025-05-21 RX ORDER — DEXTROAMPHETAMINE SACCHARATE, AMPHETAMINE ASPARTATE MONOHYDRATE, DEXTROAMPHETAMINE SULFATE AND AMPHETAMINE SULFATE 7.5; 7.5; 7.5; 7.5 MG/1; MG/1; MG/1; MG/1
30 CAPSULE, EXTENDED RELEASE ORAL DAILY
Qty: 30 CAPSULE | Refills: 0 | Status: SHIPPED | OUTPATIENT
Start: 2025-05-21 | End: 2025-06-20

## 2025-05-21 NOTE — TELEPHONE ENCOUNTER
Last OV: 4/23/2025  ADHD   Last RX:    Next scheduled apt: 7/23/2025  3 months ADHD         Pt requesting a refill   Medication pending for approval

## 2025-06-17 DIAGNOSIS — F90.2 ATTENTION DEFICIT HYPERACTIVITY DISORDER (ADHD), COMBINED TYPE: ICD-10-CM

## 2025-06-17 RX ORDER — DEXTROAMPHETAMINE SACCHARATE, AMPHETAMINE ASPARTATE MONOHYDRATE, DEXTROAMPHETAMINE SULFATE AND AMPHETAMINE SULFATE 7.5; 7.5; 7.5; 7.5 MG/1; MG/1; MG/1; MG/1
30 CAPSULE, EXTENDED RELEASE ORAL DAILY
Qty: 30 CAPSULE | Refills: 0 | Status: SHIPPED | OUTPATIENT
Start: 2025-06-17 | End: 2025-07-17

## 2025-06-17 NOTE — TELEPHONE ENCOUNTER
Last OV: 4/23/2025  ADHD  Last RX:    Next scheduled apt: 7/23/2025  3 months         Pt requesting a refill   Medication pending for approval

## 2025-06-23 DIAGNOSIS — F41.9 ANXIETY: ICD-10-CM

## 2025-06-23 RX ORDER — CITALOPRAM HYDROBROMIDE 20 MG/1
TABLET ORAL
Qty: 90 TABLET | Refills: 1 | Status: SHIPPED | OUTPATIENT
Start: 2025-06-23

## 2025-06-23 NOTE — TELEPHONE ENCOUNTER
Last OV: 4/23/2025  ADHD  10/31/24 anxiety   Last RX:    Next scheduled apt: 7/23/2025  3 months           Pt requesting a refill   Medication pending for approval

## 2025-07-11 ENCOUNTER — PATIENT MESSAGE (OUTPATIENT)
Dept: FAMILY MEDICINE CLINIC | Age: 35
End: 2025-07-11

## 2025-07-11 DIAGNOSIS — F90.2 ATTENTION DEFICIT HYPERACTIVITY DISORDER (ADHD), COMBINED TYPE: Primary | ICD-10-CM

## 2025-07-15 RX ORDER — DEXTROAMPHETAMINE SACCHARATE, AMPHETAMINE ASPARTATE MONOHYDRATE, DEXTROAMPHETAMINE SULFATE AND AMPHETAMINE SULFATE 5; 5; 5; 5 MG/1; MG/1; MG/1; MG/1
CAPSULE, EXTENDED RELEASE ORAL
Qty: 60 CAPSULE | Refills: 0 | Status: SHIPPED | OUTPATIENT
Start: 2025-07-15 | End: 2025-08-15

## 2025-07-17 DIAGNOSIS — F41.9 ANXIETY: ICD-10-CM

## 2025-07-17 RX ORDER — ALPRAZOLAM 0.5 MG
0.5 TABLET ORAL NIGHTLY PRN
Qty: 30 TABLET | Refills: 2 | Status: SHIPPED | OUTPATIENT
Start: 2025-07-17 | End: 2025-10-15

## 2025-07-17 NOTE — TELEPHONE ENCOUNTER
Last OV: 4/23/2025 ADHD 02/03/25 anxiety and ADHD   Last RX: =   Next scheduled apt: 7/23/2025  3 months         Pt requesting a refill   Medication pending for approval   =

## 2025-07-23 ENCOUNTER — OFFICE VISIT (OUTPATIENT)
Dept: FAMILY MEDICINE CLINIC | Age: 35
End: 2025-07-23
Payer: COMMERCIAL

## 2025-07-23 VITALS
SYSTOLIC BLOOD PRESSURE: 148 MMHG | DIASTOLIC BLOOD PRESSURE: 98 MMHG | OXYGEN SATURATION: 97 % | BODY MASS INDEX: 36.62 KG/M2 | WEIGHT: 248 LBS

## 2025-07-23 DIAGNOSIS — F90.2 ATTENTION DEFICIT HYPERACTIVITY DISORDER (ADHD), COMBINED TYPE: Primary | ICD-10-CM

## 2025-07-23 DIAGNOSIS — R03.0 ELEVATED BLOOD PRESSURE READING WITHOUT DIAGNOSIS OF HYPERTENSION: ICD-10-CM

## 2025-07-23 DIAGNOSIS — F41.9 ANXIETY: ICD-10-CM

## 2025-07-23 PROCEDURE — 99213 OFFICE O/P EST LOW 20 MIN: CPT | Performed by: FAMILY MEDICINE

## 2025-07-23 ASSESSMENT — ENCOUNTER SYMPTOMS
TROUBLE SWALLOWING: 0
SORE THROAT: 0
ABDOMINAL PAIN: 0
SHORTNESS OF BREATH: 0
VOMITING: 0
BLOOD IN STOOL: 0
COUGH: 0
CHANGE IN BOWEL HABIT: 0
DIARRHEA: 0
EYE DISCHARGE: 0
EYE REDNESS: 0

## 2025-07-23 NOTE — PATIENT INSTRUCTIONS
SURVEY:    You may be receiving a survey from [employer] regarding your visit today.    Please complete the survey to enable us to provide the highest quality of care to you and your family.      Thank you.    Clinical Care Team: MD Tino Britton LPN              Triage: Bryanna Scott CMA              Clerical Team: Bryanna Li

## 2025-07-23 NOTE — PROGRESS NOTES
Stroke Paternal Grandfather        Review of Systems:       Review of Systems   Constitutional:  Negative for appetite change, chills, fatigue, fever and unexpected weight change.   HENT:  Negative for sore throat and trouble swallowing.    Eyes:  Negative for discharge, redness and visual disturbance.   Respiratory:  Negative for cough and shortness of breath.    Cardiovascular:  Negative for chest pain and palpitations.   Gastrointestinal:  Negative for abdominal pain, blood in stool, change in bowel habit, diarrhea and vomiting.   Skin:  Negative for rash.   Neurological:  Negative for dizziness, light-headedness and headaches.   Psychiatric/Behavioral:  Negative for dysphoric mood and hallucinations. The patient does not have insomnia.          Physical Exam:     Physical Exam  Vitals reviewed.   Constitutional:       General: He is not in acute distress.     Appearance: Normal appearance. He is well-developed. He is not ill-appearing.   HENT:      Head: Normocephalic and atraumatic.   Eyes:      Conjunctiva/sclera: Conjunctivae normal.   Neck:      Thyroid: No thyromegaly.   Cardiovascular:      Rate and Rhythm: Normal rate and regular rhythm.      Heart sounds: No murmur heard.  Pulmonary:      Effort: Pulmonary effort is normal.      Breath sounds: Normal breath sounds.   Abdominal:      General: Bowel sounds are normal. There is no distension.      Palpations: Abdomen is soft.      Tenderness: There is no abdominal tenderness.   Musculoskeletal:      Cervical back: Neck supple.   Skin:     General: Skin is warm and dry.      Findings: No rash.   Neurological:      Mental Status: He is alert and oriented to person, place, and time.         Vitals:  BP (!) 148/98   Wt 112.5 kg (248 lb)   SpO2 97%   BMI 36.62 kg/m²       Data:     Lab Results   Component Value Date/Time     07/19/2023 07:29 AM    K 4.3 07/19/2023 07:29 AM    CL 98 07/19/2023 07:29 AM    CO2 28 07/19/2023 07:29 AM    BUN 17 07/19/2023

## 2025-07-27 ENCOUNTER — PATIENT MESSAGE (OUTPATIENT)
Dept: FAMILY MEDICINE CLINIC | Age: 35
End: 2025-07-27

## 2025-07-28 RX ORDER — AMLODIPINE BESYLATE 5 MG/1
5 TABLET ORAL DAILY
Qty: 30 TABLET | Refills: 1 | Status: SHIPPED | OUTPATIENT
Start: 2025-07-28

## 2025-07-28 NOTE — TELEPHONE ENCOUNTER
Tell pt I sent the norvasc 5mg to take ONE every AM and then keep checking BP at home -- send in results in TWO weeks on the medication so I can see how he is doing after being on medication for awhile

## 2025-08-07 DIAGNOSIS — F90.2 ATTENTION DEFICIT HYPERACTIVITY DISORDER (ADHD), COMBINED TYPE: ICD-10-CM

## 2025-08-08 RX ORDER — DEXTROAMPHETAMINE SACCHARATE, AMPHETAMINE ASPARTATE MONOHYDRATE, DEXTROAMPHETAMINE SULFATE AND AMPHETAMINE SULFATE 5; 5; 5; 5 MG/1; MG/1; MG/1; MG/1
CAPSULE, EXTENDED RELEASE ORAL
Qty: 60 CAPSULE | Refills: 0 | Status: SHIPPED | OUTPATIENT
Start: 2025-08-08 | End: 2025-09-07